# Patient Record
Sex: MALE | Race: WHITE | NOT HISPANIC OR LATINO | Employment: OTHER | URBAN - METROPOLITAN AREA
[De-identification: names, ages, dates, MRNs, and addresses within clinical notes are randomized per-mention and may not be internally consistent; named-entity substitution may affect disease eponyms.]

---

## 2018-09-22 ENCOUNTER — HOSPITAL ENCOUNTER (EMERGENCY)
Facility: HOSPITAL | Age: 83
Discharge: NURSING, REHAB OR LONG-TERM CARE | End: 2018-09-22
Attending: EMERGENCY MEDICINE | Admitting: EMERGENCY MEDICINE
Payer: MEDICARE

## 2018-09-22 VITALS
HEART RATE: 90 BPM | SYSTOLIC BLOOD PRESSURE: 112 MMHG | OXYGEN SATURATION: 95 % | RESPIRATION RATE: 18 BRPM | DIASTOLIC BLOOD PRESSURE: 62 MMHG | TEMPERATURE: 97.6 F

## 2018-09-22 DIAGNOSIS — R31.9 PAINLESS HEMATURIA: Primary | ICD-10-CM

## 2018-09-22 DIAGNOSIS — R31.0 GROSS HEMATURIA: ICD-10-CM

## 2018-09-22 DIAGNOSIS — Z97.8 CHRONIC INDWELLING FOLEY CATHETER: ICD-10-CM

## 2018-09-22 LAB
ALBUMIN SERPL BCP-MCNC: 2.8 G/DL (ref 3.5–5)
ALP SERPL-CCNC: 194 U/L (ref 46–116)
ALT SERPL W P-5'-P-CCNC: 16 U/L (ref 12–78)
ANION GAP SERPL CALCULATED.3IONS-SCNC: 7 MMOL/L (ref 4–13)
AST SERPL W P-5'-P-CCNC: 10 U/L (ref 5–45)
BASOPHILS # BLD AUTO: 0.04 THOUSANDS/ΜL (ref 0–0.1)
BASOPHILS NFR BLD AUTO: 0 % (ref 0–1)
BILIRUB SERPL-MCNC: 0.31 MG/DL (ref 0.2–1)
BUN SERPL-MCNC: 14 MG/DL (ref 5–25)
CALCIUM SERPL-MCNC: 8.7 MG/DL (ref 8.3–10.1)
CHLORIDE SERPL-SCNC: 100 MMOL/L (ref 100–108)
CO2 SERPL-SCNC: 26 MMOL/L (ref 21–32)
CREAT SERPL-MCNC: 1.01 MG/DL (ref 0.6–1.3)
EOSINOPHIL # BLD AUTO: 0.43 THOUSAND/ΜL (ref 0–0.61)
EOSINOPHIL NFR BLD AUTO: 4 % (ref 0–6)
ERYTHROCYTE [DISTWIDTH] IN BLOOD BY AUTOMATED COUNT: 13.2 % (ref 11.6–15.1)
GFR SERPL CREATININE-BSD FRML MDRD: 66 ML/MIN/1.73SQ M
GLUCOSE SERPL-MCNC: 156 MG/DL (ref 65–140)
HCT VFR BLD AUTO: 33.7 % (ref 36.5–49.3)
HGB BLD-MCNC: 11.1 G/DL (ref 12–17)
IMM GRANULOCYTES # BLD AUTO: 0.05 THOUSAND/UL (ref 0–0.2)
IMM GRANULOCYTES NFR BLD AUTO: 0 % (ref 0–2)
LYMPHOCYTES # BLD AUTO: 2.04 THOUSANDS/ΜL (ref 0.6–4.47)
LYMPHOCYTES NFR BLD AUTO: 17 % (ref 14–44)
MCH RBC QN AUTO: 29.8 PG (ref 26.8–34.3)
MCHC RBC AUTO-ENTMCNC: 32.9 G/DL (ref 31.4–37.4)
MCV RBC AUTO: 91 FL (ref 82–98)
MONOCYTES # BLD AUTO: 0.53 THOUSAND/ΜL (ref 0.17–1.22)
MONOCYTES NFR BLD AUTO: 4 % (ref 4–12)
NEUTROPHILS # BLD AUTO: 9.17 THOUSANDS/ΜL (ref 1.85–7.62)
NEUTS SEG NFR BLD AUTO: 75 % (ref 43–75)
NRBC BLD AUTO-RTO: 0 /100 WBCS
PLATELET # BLD AUTO: 322 THOUSANDS/UL (ref 149–390)
PMV BLD AUTO: 9.3 FL (ref 8.9–12.7)
POTASSIUM SERPL-SCNC: 3.6 MMOL/L (ref 3.5–5.3)
PROT SERPL-MCNC: 7.3 G/DL (ref 6.4–8.2)
RBC # BLD AUTO: 3.72 MILLION/UL (ref 3.88–5.62)
SODIUM SERPL-SCNC: 133 MMOL/L (ref 136–145)
WBC # BLD AUTO: 12.26 THOUSAND/UL (ref 4.31–10.16)

## 2018-09-22 PROCEDURE — 80053 COMPREHEN METABOLIC PANEL: CPT | Performed by: EMERGENCY MEDICINE

## 2018-09-22 PROCEDURE — 99284 EMERGENCY DEPT VISIT MOD MDM: CPT

## 2018-09-22 PROCEDURE — 36415 COLL VENOUS BLD VENIPUNCTURE: CPT | Performed by: EMERGENCY MEDICINE

## 2018-09-22 PROCEDURE — 85025 COMPLETE CBC W/AUTO DIFF WBC: CPT | Performed by: EMERGENCY MEDICINE

## 2018-09-22 NOTE — ED PROVIDER NOTES
History  Chief Complaint   Patient presents with    Blood in Urine     [facility] main catheter [facility]  Pt has no complaints  HPI    This is a 80-year-old male presenting emergency room for evaluation of hematuria  Patient has a history of a chronic and ongoing for the past 5 years  Patient has exchange performed every 1 month  His last exchange was performed 3 weeks ago  The caretaker noticed that he had some hematuria 5 days ago  This resolved  However, just a few hours prior to arrival in the ED, the caretaker noticed gross painless hematuria  The patient is asymptomatic at this time, he is denying any complaints, no dysuria, no fevers no chills, remainder ROS negative  None       Past Medical History:   Diagnosis Date    Anemia     Dementia     Diabetes mellitus ([facility] [address] )     Diastolic heart failure (HCC)     Hyperlipidemia     Hypertension     Osteoarthritis     PVD [facility]peripheral vascular disease) ([facility])        Past Surgical History:   Procedure Laterality Date    AMPUTATION Left     left bka       History reviewed  No pertinent family history  I have reviewed and agree with the history as documented  Social History   Substance Use Topics    Smoking status: Never Smoker    Smokeless tobacco: Never Used    Alcohol use No        Review of Systems   Constitutional: Negative for chills, fatigue and fever  HENT: Negative for nosebleeds and sore throat  Eyes: Negative for redness and visual disturbance  Respiratory: Negative for shortness of breath and wheezing  Cardiovascular: Negative for chest pain and palpitations  Gastrointestinal: Negative for abdominal pain and diarrhea  Endocrine: Negative for polyuria  Genitourinary: Positive for hematuria  Negative for difficulty urinating, discharge, flank pain, penile pain, penile swelling and testicular pain  Musculoskeletal: Negative for back pain and neck stiffness     Skin: Negative for rash and wound  Neurological: Negative for seizures, speech difficulty and headaches  Psychiatric/Behavioral: Negative for dysphoric mood and hallucinations  All other systems reviewed and are negative  Physical Exam  ED Triage Vitals [09/22/18 1839]   Temperature Pulse Respirations Blood Pressure SpO2   97 6 °F (36 4 °C) 94 18 136/58 99 %      Temp src Heart Rate Source Patient Position - Orthostatic VS BP Location FiO2 (%)   -- Monitor Lying Left arm --      Pain Score       No Pain           Orthostatic Vital Signs  Vitals:    09/22/18 1839 09/22/18 2057 09/22/18 2335   BP: 136/58 110/52 112/62   Pulse: 94 97 90   Patient Position - Orthostatic VS: Lying Lying        Physical Exam   Constitutional: He is oriented to person, place, and time  He appears well-developed and well-nourished  HENT:   Head: Normocephalic and atraumatic  Right Ear: External ear normal    Left Ear: External ear normal    Mouth/Throat: Oropharynx is clear and moist    Eyes: Conjunctivae and EOM are normal  Pupils are equal, round, and reactive to light  Neck: Normal range of motion  Cardiovascular: Normal rate, regular rhythm, normal heart sounds and intact distal pulses  Pulmonary/Chest: Effort normal and breath sounds normal  He has no wheezes  He exhibits no tenderness  Abdominal: Soft  Bowel sounds are normal  There is no tenderness  There is no guarding  no suprapubic tenderness  Musculoskeletal: Normal range of motion  No CVA tenderness   Neurological: He is alert and oriented to person, place, and time  No cranial nerve deficit or sensory deficit  He exhibits normal muscle tone  Coordination normal    Skin: Skin is warm and dry  No rash noted  On examination of the patient's genital area, patient has indwelling Hays, mild erythema around the meatus, no discharge or purulence noted  Indwelling Hays catheter bag has gross hematuria  Psychiatric: He has a normal mood and affect     Nursing note and vitals reviewed  ED Medications  Medications - No data to display    Diagnostic Studies  Results Reviewed     Procedure Component Value Units Date/Time    Comprehensive metabolic panel [[id number]]  (Abnormal) Collected:  09/22/18 2052    Lab Status:  Final result Specimen:  Blood from Hand, Left Updated:  09/22/18 2124     Sodium 133 (L) mmol/L      Potassium 3 6 mmol/L      Chloride 100 mmol/L      CO2 26 mmol/L      ANION GAP 7 mmol/L      BUN 14 mg/dL      Creatinine 1 01 mg/dL      Glucose 156 (H) mg/dL      Calcium 8 7 mg/dL      AST 10 U/L      ALT 16 U/L      Alkaline Phosphatase 194 (H) U/L      Total Protein 7 3 g/dL      Albumin 2 8 (L) g/dL      Total Bilirubin 0 31 mg/dL      eGFR 66 ml/min/1 73sq m     Narrative:         National Kidney Disease Education Program recommendations are as follows:  GFR calculation is accurate only with a steady state creatinine  Chronic Kidney disease less than 60 ml/min/1 73 sq  meters  Kidney failure less than 15 ml/min/1 73 sq  meters      CBC and differential [[id number]]  (Abnormal) Collected:  09/22/18 2052    Lab Status:  Final result Specimen:  Blood from Hand, Left Updated:  09/22/18 2105     WBC 12 26 (H) Thousand/uL      RBC 3 72 (L) Million/uL      Hemoglobin 11 1 (L) g/dL      Hematocrit 33 7 (L) %      MCV 91 fL      MCH 29 8 pg      MCHC 32 9 g/dL      RDW 13 2 %      MPV 9 3 fL      Platelets 284 Thousands/uL      nRBC 0 /100 WBCs      Neutrophils Relative 75 %      Immat GRANS % 0 %      Lymphocytes Relative 17 %      Monocytes Relative 4 %      Eosinophils Relative 4 %      Basophils Relative 0 %      Neutrophils Absolute 9 17 (H) Thousands/µL      Immature Grans Absolute 0 05 Thousand/uL      Lymphocytes Absolute 2 04 Thousands/µL      Monocytes Absolute 0 53 Thousand/µL      Eosinophils Absolute 0 43 Thousand/µL      Basophils Absolute 0 04 Thousands/µL                  No orders to display         Procedures  Bladder Cath  Date/Time: 9/22/2018 9:01 PM  Performed by: Delma Broderick by: Barbi Hartman     Patient location:  ED  Consent:     Consent obtained:  Verbal    Consent given by:  Patient    Procedural risks discussed: yes  Alternatives discussed: yes  Universal protocol:     Procedure explained and questions answered to patient or proxy's satisfaction: yes      Relevant documents present and verified: yes      Patient identity confirmed:  Verbally with patient  Pre-procedure details:     Procedure purpose:  Therapeutic  Anesthesia (see MAR for exact dosages): Anesthesia method:  None  Procedure details:     Catheter insertion:  Indwelling    Approach: natural orifice      Catheter type:  Latex and Hays    Catheter size:  18 Fr    Number of attempts:  1    Successful placement: yes      Urine characteristics:  Bloody and clear (Initially output was bloody, which became clear )    Provider performed due to:  Complicated insertion  Post-procedure details:     Patient tolerance of procedure: Tolerated well, no immediate complications          Phone Consults  ED Phone Contact    ED Course  ED Course as of Sep 23 1543   Sat Sep 22, 2018   2115 Hemoglobin: (!) 11 1   2115 Platelets: 402           Identification of Seniors at Risk      Most Recent Value   (ISAR) Identification of Seniors at Risk   Before the illness or injury that brought you to the Emergency, did you need someone to help you on a regular basis? 1 Filed at: 09/22/2018 1840   In the last 24 hours, have you needed more help than usual?  0 Filed at: 09/22/2018 1840   Have you been hospitalized for one or more nights during the past 6 months? 0 Filed at: 09/22/2018 1840   In general, do you see well?  0 Filed at: 09/22/2018 1840   In general, do you have serious problems with your memory? 0 Filed at: 09/22/2018 1840   Do you take more than three different medications every day?   1 Filed at: 09/22/2018 1840   ISAR Score  2 Filed at: 09/22/2018 4801 [facility] Time    This is a 79-year-old male presenting to the emergency department for evaluation of painless gross hematuria  Will check a CBC, CMP looking for a creatinine elevation or decrease in hemoglobin or thrombocytopenia  Patient otherwise asymptomatic, no concern for UTI  We performed a Hays exchange, given that the Hays appear to be blocked, from a clot  See procedure note above  Labs are unremarkable, patient will be discharged home and follow up with Urology  Return precautions given, including of decreased drainage, increasing pain  The patient was instructed to follow up as documented  Strict return precautions were discussed with the patient and the patient was instructed to return to the emergency department immediately if symptoms worsen  The patient/patient family member acknowledged and were in agreement with plan  Disposition  Final diagnoses:   Gross hematuria   Painless hematuria   Chronic indwelling Hays catheter     Time reflects when diagnosis was documented in both MDM as applicable and the Disposition within this note     Time User Action Codes Description Comment    9/22/2018  9:06 PM Torsten Cheese Add [R31 0] Gross hematuria     9/22/2018  9:06 PM Torsten Cheese Add [R31 9] Painless hematuria     9/22/2018  9:06 PM Torsten Cheese Modify [R31 0] Gross hematuria     9/22/2018  9:06 PM Torsten Cheese Modify [R31 9] Painless hematuria     9/22/2018  9:06 PM Torsten Cheese Add [Z92 89] Chronic indwelling Hays catheter       ED Disposition     ED Disposition Condition Comment    Discharge  [address] discharge to home/self care      Condition at discharge: Good        Follow-up Information     Follow up With Specialties Details Why Contact Info Additional Information    Renée Cam MD Urology Schedule an appointment as soon as possible for a visit today For follow up regarding your symptoms [address] [place] [address] [address] Emergency Department Emergency Medicine Go to If symptoms worsen [address]  [phone]  ED, [address] [place] [address], [facility]          There are no discharge medications for this patient  No discharge procedures on file  ED Provider  Attending physically available and evaluated Meño Giraldo I managed the patient along with the ED Attending      Electronically Signed by         La Carlos MD  09/23/18 4401

## 2018-09-23 NOTE — ED ATTENDING ATTESTATION
Monique Goldberg MD, saw and evaluated the patient  I have discussed the patient with the resident/non-physician practitioner and agree with the resident's/non-physician practitioner's findings, Plan of Care, and MDM as documented in the resident's/non-physician practitioner's note, except where noted  All available labs and Radiology studies were reviewed  At this point I agree with the current assessment done in the Emergency Department  I have conducted an independent evaluation of this patient a history and physical is as follows: This patient is an 49-year-old man with chronic indwelling Hays who presents with hematuria  This morning the patient noticed dark blood in his catheter bag, denies any associated pain including abdominal pain, flank pain, penile pain, dysuria  No fever or chills  No nausea or vomiting  No chest pain, shortness of breath, dizziness, lightheadedness  The patient is on aspirin and Plavix, is not on anticoagulation  Vital signs reviewed  On examination the patient is awake and alert, in no acute distress  Head is atraumatic and normocephalic  Pupils equal and reactive bilaterally, normal conjunctiva  Oropharynx clear  Moist mucous membranes  Neck is supple  Heart regular rate and rhythm, no murmurs, rubs or gallops  Lungs clear to auscultation bilaterally with no respiratory distress  Abdomen soft, nontender, nondistended  Hays catheter in place, does not appear to be draining  Extremities with no edema and normal range of motion  Skin warm, dry, intact  Assessment and plan:  49-year-old man with hematuria  Attempted irrigation of the patient's indwelling Hays and unable to effectively irrigate, bedside ultrasound showed full bladder  Replaced Hays catheter with 25 Turkmen coude and irrigated using piston  and sterile saline  Moderate amount of clot removed and ultimately able to decompress the bladder, confirmed by ultrasound    Checking CBC to assess for anemia or thrombocytopenia  As long as CBC is reassuring will plan discharge with urology follow-up  I discussed return precautions with the patient and his caregiver        Critical Care Time  CritCare Time    Procedures

## 2018-09-23 NOTE — DISCHARGE INSTRUCTIONS
Hematuria   AMBULATORY CARE:   Hematuria  is blood in your urine  Your urine may be bright red to dark brown  Common symptoms you might have with hematuria include the following:   · Fever     · Nausea and vomiting     · Pain or bruising on your lower back or sides     · Pain when you urinate     · More urination than usual, or the need to urinate right away  Seek care immediately if:   · You have blood in your urine after a new injury, such as a fall  · You are urinating very small amounts or not at all  · You feel like you cannot empty your bladder  · You have severe back or side pain that does not go away with treatment  Contact your healthcare provider if:   · You have a fever that gets worse or does not go away with treatment  · You cannot keep liquids or medicines down  · Your urine gets darker, even after you drink extra liquids  · You have questions or concerns about your condition, treatment, or care  Treatment for hematuria  depends on the cause of your hematuria  Treatment may not be needed  You may need medicines to treat an infection  Ask your healthcare provider for more information about the treatment you may need  Drink liquids as directed: You may need to drink extra liquids to help flush the blood from your body through your urine  Water is the best liquid to drink  Ask how much liquid to drink each day and which liquids are best for you  Follow up with your healthcare provider as directed:  Write down your questions so you remember to ask them during your visits  © 2017 [address] Information is for End User's use only and may not be sold, redistributed or otherwise used for commercial purposes  All illustrations and images included in CareNotes® are the copyrighted property of A D A M , Inc  or Shamar Tanner  The above information is an  only  It is not intended as medical advice for individual conditions or treatments   Talk to your doctor, nurse or pharmacist before following any medical regimen to see if it is safe and effective for you  Urinary Leg Bag   WHAT YOU NEED TO KNOW:   What is a urinary leg bag? A urinary leg bag holds urine that drains from your catheter  It fits under your clothes and allows you to do your normal daily activities  How do I use a urinary leg bag? · Wash your hands  before and after you touch your catheter, tubing, or drainage bag  Use soap and water  This reduces the risk of infection  · Strap your leg bag to your thigh or calf  Make sure the straps are comfortable  The straps can cause problems with blood flow in your leg if they are too tight  · Clean the tip of the drainage tube with alcohol  before attaching it to your catheter  This helps prevent bacteria from getting into your catheter  · The connecting tube should not pull on your catheter  Skin breakdown can occur if there is constant pulling on the catheter  · Check the tube often to make sure it is not kinked or twisted  Blockage in the tube can cause urine to back up into your bladder  Your urine must flow straight through the tube into your leg bag  · Always keep the leg bag below your bladder  This prevents urine from the bag going back into your bladder, which may cause an infection  · Empty your leg bag when it is ½ full, or every 3 hours  A full bag may break or disconnect from the catheter  · Change to your bedside bag before you go to bed  Your bedside bag can hold more urine  Do not use your leg bag at night because it could become too full or break  · Clean your leg bag after every use  Fill the bag with 2 parts vinegar and 3 parts water  Let it soak for 20 minutes, then rinse and let dry  Follow your healthcare provider's instruction on replacing your leg bag with a new one  CARE AGREEMENT:   You have the right to help plan your care   Learn about your health condition and how it may be treated  Discuss treatment options with your caregivers to decide what care you want to receive  You always have the right to refuse treatment  The above information is an  only  It is not intended as medical advice for individual conditions or treatments  Talk to your doctor, nurse or pharmacist before following any medical regimen to see if it is safe and effective for you  © 2017 [address] Information is for End User's use only and may not be sold, redistributed or otherwise used for commercial purposes  All illustrations and images included in CareNotes® are the copyrighted property of A D A M [employer] Inc  or [employer]  Hays Catheter Placement and Care   WHAT YOU NEED TO KNOW:   What is a Hays catheter? A Hays catheter is a sterile tube that is inserted into your bladder to drain urine  It is also called an indwelling urinary catheter  The tip of the catheter has a small balloon filled with solution that holds the catheter in your bladder  How is a Hays catheter placed? · Your healthcare provider will clean your genital area with a sterile solution  He or she will put lubricant jelly on the catheter to help it go in smoothly  The end of the catheter with the deflated balloon will be inserted into your urethra  The catheter will be moved slowly and gently into your bladder  You may be asked to take slow, deep breaths or to push as if you were trying to urinate as the catheter is inserted  · When your healthcare provider sees urine flowing from the catheter, he or she will fill the balloon at the end of the catheter  The balloon holds the catheter in place so it does not come out  Your healthcare provider will attach the open end of the catheter to a sterile drainage bag  How do I care for my Hays catheter? · Clean your genital area 2 times every day  Clean your catheter and the area around where it was inserted  Use soap and water   Clean your anal opening and catheter area after every bowel movement  · Secure the catheter tube  so you do not pull or move the catheter  This helps prevent pain and bladder spasms  Healthcare providers will show you how to use medical tape or a strap to secure the catheter tube to your body  · Keep a closed drainage system  Your Hays catheter should always be attached to the drainage bag to form a closed system  Do not disconnect any part of the closed system unless you need to change the bag  How do I care for my drainage bag? · Ask if a leg bag is right for you  A leg bag can be worn under your clothes  Ask your healthcare provider for more information about a leg bag  · Keep the drainage bag below the level of your waist   This helps stop urine from moving back up the tubing and into your bladder  Do not loop or kink the tubing  This can cause urine to back up and collect in your bladder  Do not let the drainage bag touch or lie on the floor  · Empty the drainage bag when needed  The weight of a full drainage bag can be painful  Empty the drainage bag every 3 to 6 hours or when it is ? full  · Clean and change the drainage bag as directed  Ask your healthcare provider how often you should change the drainage bag and what cleaning solution to use  Wear disposable gloves when you change the bag  Do not allow the end of the catheter or tubing to touch anything  Clean the ends with an alcohol pad before you reconnect them  What can I do if problems develop? · No urine is draining into the bag:      ¨ Check for kinks in the tubing and straighten them out  ¨ Check the tape or strap used to secure the catheter tube to your skin  Make sure it is not blocking the tube  ¨ Make sure you are not sitting or lying on the tubing      ¨ Make sure the urine bag is hanging below the level of your waist     · Urine leaks from or around the catheter, tubing, or drainage bag:  Check if the closed drainage system has accidently come open or apart  Clean the catheter and tubing ends with a new alcohol pad and reconnect them  What are the risks of a Hays catheter? You may develop an infection caused by bacteria  This can happen when the drainage system is opened and bacteria get inside the tubing  You can also get an infection if the catheter equipment is not cleaned well or you do not wash your hands  The infection can spread to your bladder or other organs, and may become severe  How can I help prevent an infection? · Wash your hands often  Wash before and after you touch your catheter, tubing, or drainage bag  Use soap and water  Wear clean disposable gloves when you care for your catheter or disconnect the drainage bag  Wash your hands before you prepare or eat food  · Drink liquids as directed  Ask your healthcare provider how much liquid to drink each day and which liquids are best for you  Liquids will help flush your kidneys and bladder to help prevent infection  When should I seek immediate care? · Your catheter comes out  · You suddenly have material that looks like sand in the tubing or drainage bag  · No urine is draining into the bag and you have checked the system  · You have pain in your hip, back, pelvis, or lower abdomen  · You are confused or cannot think clearly  When should I contact my healthcare provider? · You have a fever  · You have bladder spasms for more than 1 day after the catheter is placed  · You see blood in the tubing or drainage bag  · You have a rash or itching where the catheter tube is secured to your skin  · Urine leaks from or around the catheter, tubing, or drainage bag  · The closed drainage system has accidently come open or apart  · You see a layer of crystals inside the tubing  · You have questions or concerns about your condition or care  CARE AGREEMENT:   You have the right to help plan your care   Learn about your health condition and how it may be treated  Discuss treatment options with your caregivers to decide what care you want to receive  You always have the right to refuse treatment  The above information is an  only  It is not intended as medical advice for individual conditions or treatments  Talk to your doctor, nurse or pharmacist before following any medical regimen to see if it is safe and effective for you  © 2017 [address] Information is for End User's use only and may not be sold, redistributed or otherwise used for commercial purposes  All illustrations and images included in CareNotes® are the copyrighted property of A [employer] M [employer] Inc  or Shamar Tanner

## 2018-09-23 NOTE — ED NOTES
Transport with [facility] arranged for [phone]         Zee Grayson, ANTWON  09/22/18 4251

## 2018-09-23 NOTE — ED NOTES
[facility] can transport at [address] [place] [address], [address] [place] [address]  09/22/18 7213

## 2018-09-23 NOTE — ED NOTES
Dr yusuf and dr Carlitos Senior placed 16fr coude cath after vigorous flushing  New catheter attached to leg bag, appears to drain well         Leslie Patel RN  09/22/18 2035

## 2019-03-04 ENCOUNTER — APPOINTMENT (EMERGENCY)
Dept: RADIOLOGY | Facility: HOSPITAL | Age: 84
End: 2019-03-04
Payer: MEDICARE

## 2019-03-04 ENCOUNTER — HOSPITAL ENCOUNTER (EMERGENCY)
Facility: HOSPITAL | Age: 84
Discharge: HOME | End: 2019-03-04
Attending: EMERGENCY MEDICINE | Admitting: EMERGENCY MEDICINE
Payer: MEDICARE

## 2019-03-04 VITALS
DIASTOLIC BLOOD PRESSURE: 60 MMHG | HEART RATE: 92 BPM | TEMPERATURE: 98.3 F | RESPIRATION RATE: 20 BRPM | SYSTOLIC BLOOD PRESSURE: 135 MMHG | OXYGEN SATURATION: 97 %

## 2019-03-04 DIAGNOSIS — N39.0 UTI (URINARY TRACT INFECTION): Primary | ICD-10-CM

## 2019-03-04 DIAGNOSIS — K59.00 CONSTIPATION: ICD-10-CM

## 2019-03-04 LAB
ALBUMIN SERPL BCP-MCNC: 3.2 G/DL (ref 3.5–5)
ALP SERPL-CCNC: 198 U/L (ref 46–116)
ALT SERPL W P-5'-P-CCNC: 16 U/L (ref 12–78)
ANION GAP SERPL CALCULATED.3IONS-SCNC: 7 MMOL/L (ref 4–13)
AST SERPL W P-5'-P-CCNC: 11 U/L (ref 5–45)
ATRIAL RATE: 80 BPM
BACTERIA UR QL AUTO: ABNORMAL /HPF
BASOPHILS # BLD AUTO: 0.02 THOUSANDS/ΜL (ref 0–0.1)
BASOPHILS NFR BLD AUTO: 0 % (ref 0–1)
BILIRUB SERPL-MCNC: 0.51 MG/DL (ref 0.2–1)
BILIRUB UR QL STRIP: NEGATIVE
BUN SERPL-MCNC: 21 MG/DL (ref 5–25)
CALCIUM SERPL-MCNC: 9 MG/DL (ref 8.3–10.1)
CHLORIDE SERPL-SCNC: 104 MMOL/L (ref 100–108)
CLARITY UR: ABNORMAL
CLARITY, POC: NORMAL
CO2 SERPL-SCNC: 26 MMOL/L (ref 21–32)
COLOR UR: YELLOW
COLOR, POC: YELLOW
CREAT SERPL-MCNC: 1.06 MG/DL (ref 0.6–1.3)
EOSINOPHIL # BLD AUTO: 0.16 THOUSAND/ΜL (ref 0–0.61)
EOSINOPHIL NFR BLD AUTO: 1 % (ref 0–6)
ERYTHROCYTE [DISTWIDTH] IN BLOOD BY AUTOMATED COUNT: 12.9 % (ref 11.6–15.1)
GFR SERPL CREATININE-BSD FRML MDRD: 62 ML/MIN/1.73SQ M
GLUCOSE SERPL-MCNC: 126 MG/DL (ref 65–140)
GLUCOSE UR STRIP-MCNC: NEGATIVE MG/DL
HCT VFR BLD AUTO: 39.1 % (ref 36.5–49.3)
HGB BLD-MCNC: 13 G/DL (ref 12–17)
HGB UR QL STRIP.AUTO: ABNORMAL
IMM GRANULOCYTES # BLD AUTO: 0.05 THOUSAND/UL (ref 0–0.2)
IMM GRANULOCYTES NFR BLD AUTO: 0 % (ref 0–2)
KETONES UR STRIP-MCNC: NEGATIVE MG/DL
LACTATE SERPL-SCNC: 1.9 MMOL/L (ref 0.5–2)
LEUKOCYTE ESTERASE UR QL STRIP: ABNORMAL
LIPASE SERPL-CCNC: 68 U/L (ref 73–393)
LYMPHOCYTES # BLD AUTO: 1.69 THOUSANDS/ΜL (ref 0.6–4.47)
LYMPHOCYTES NFR BLD AUTO: 13 % (ref 14–44)
MCH RBC QN AUTO: 30.7 PG (ref 26.8–34.3)
MCHC RBC AUTO-ENTMCNC: 33.2 G/DL (ref 31.4–37.4)
MCV RBC AUTO: 92 FL (ref 82–98)
MONOCYTES # BLD AUTO: 0.49 THOUSAND/ΜL (ref 0.17–1.22)
MONOCYTES NFR BLD AUTO: 4 % (ref 4–12)
NEUTROPHILS # BLD AUTO: 10.81 THOUSANDS/ΜL (ref 1.85–7.62)
NEUTS SEG NFR BLD AUTO: 82 % (ref 43–75)
NITRITE UR QL STRIP: NEGATIVE
NON-SQ EPI CELLS URNS QL MICRO: ABNORMAL /HPF
NRBC BLD AUTO-RTO: 0 /100 WBCS
OTHER STN SPEC: ABNORMAL
P AXIS: 76 DEGREES
PH UR STRIP.AUTO: 5.5 [PH] (ref 4.5–8)
PLATELET # BLD AUTO: 313 THOUSANDS/UL (ref 149–390)
PMV BLD AUTO: 9.7 FL (ref 8.9–12.7)
POTASSIUM SERPL-SCNC: 4.1 MMOL/L (ref 3.5–5.3)
PR INTERVAL: 280 MS
PROT SERPL-MCNC: 7.4 G/DL (ref 6.4–8.2)
PROT UR STRIP-MCNC: ABNORMAL MG/DL
QRS AXIS: -60 DEGREES
QRSD INTERVAL: 126 MS
QT INTERVAL: 406 MS
QTC INTERVAL: 468 MS
RBC # BLD AUTO: 4.23 MILLION/UL (ref 3.88–5.62)
RBC #/AREA URNS AUTO: ABNORMAL /HPF
SODIUM SERPL-SCNC: 137 MMOL/L (ref 136–145)
SP GR UR STRIP.AUTO: 1.01 (ref 1–1.03)
T WAVE AXIS: 43 DEGREES
TROPONIN I SERPL-MCNC: <0.02 NG/ML
UROBILINOGEN UR QL STRIP.AUTO: 1 E.U./DL
VENTRICULAR RATE: 80 BPM
WBC # BLD AUTO: 13.22 THOUSAND/UL (ref 4.31–10.16)
WBC #/AREA URNS AUTO: ABNORMAL /HPF

## 2019-03-04 PROCEDURE — 81003 URINALYSIS AUTO W/O SCOPE: CPT

## 2019-03-04 PROCEDURE — 83690 ASSAY OF LIPASE: CPT | Performed by: EMERGENCY MEDICINE

## 2019-03-04 PROCEDURE — 87147 CULTURE TYPE IMMUNOLOGIC: CPT

## 2019-03-04 PROCEDURE — 36415 COLL VENOUS BLD VENIPUNCTURE: CPT | Performed by: EMERGENCY MEDICINE

## 2019-03-04 PROCEDURE — 87077 CULTURE AEROBIC IDENTIFY: CPT

## 2019-03-04 PROCEDURE — 74177 CT ABD & PELVIS W/CONTRAST: CPT

## 2019-03-04 PROCEDURE — 99285 EMERGENCY DEPT VISIT HI MDM: CPT

## 2019-03-04 PROCEDURE — 83605 ASSAY OF LACTIC ACID: CPT | Performed by: EMERGENCY MEDICINE

## 2019-03-04 PROCEDURE — 80053 COMPREHEN METABOLIC PANEL: CPT | Performed by: EMERGENCY MEDICINE

## 2019-03-04 PROCEDURE — 81002 URINALYSIS NONAUTO W/O SCOPE: CPT | Performed by: EMERGENCY MEDICINE

## 2019-03-04 PROCEDURE — 71046 X-RAY EXAM CHEST 2 VIEWS: CPT

## 2019-03-04 PROCEDURE — 84484 ASSAY OF TROPONIN QUANT: CPT | Performed by: EMERGENCY MEDICINE

## 2019-03-04 PROCEDURE — 93005 ELECTROCARDIOGRAM TRACING: CPT

## 2019-03-04 PROCEDURE — 87186 SC STD MICRODIL/AGAR DIL: CPT

## 2019-03-04 PROCEDURE — 96365 THER/PROPH/DIAG IV INF INIT: CPT

## 2019-03-04 PROCEDURE — 85025 COMPLETE CBC W/AUTO DIFF WBC: CPT | Performed by: EMERGENCY MEDICINE

## 2019-03-04 PROCEDURE — 87086 URINE CULTURE/COLONY COUNT: CPT

## 2019-03-04 PROCEDURE — 93010 ELECTROCARDIOGRAM REPORT: CPT | Performed by: INTERNAL MEDICINE

## 2019-03-04 PROCEDURE — 81001 URINALYSIS AUTO W/SCOPE: CPT

## 2019-03-04 RX ORDER — KETOCONAZOLE 20 MG/G
CREAM TOPICAL DAILY
COMMUNITY

## 2019-03-04 RX ORDER — POLYETHYLENE GLYCOL 3350 17 G/17G
17 POWDER, FOR SOLUTION ORAL DAILY
Qty: 255 G | Refills: 0 | Status: SHIPPED | OUTPATIENT
Start: 2019-03-04

## 2019-03-04 RX ORDER — CEPHALEXIN 250 MG/1
500 CAPSULE ORAL 4 TIMES DAILY
Qty: 56 CAPSULE | Refills: 0 | Status: SHIPPED | OUTPATIENT
Start: 2019-03-04 | End: 2019-03-11

## 2019-03-04 RX ORDER — NYSTATIN AND TRIAMCINOLONE ACETONIDE 100000; 1 [USP'U]/G; MG/G
1 OINTMENT TOPICAL 2 TIMES DAILY
COMMUNITY

## 2019-03-04 RX ORDER — SYRINGE-NEEDLE,INSULIN,0.5 ML 27GX1/2"
SYRINGE, EMPTY DISPOSABLE MISCELLANEOUS
COMMUNITY
End: 2021-07-16 | Stop reason: ALTCHOICE

## 2019-03-04 RX ORDER — PANTOPRAZOLE SODIUM 40 MG/1
40 TABLET, DELAYED RELEASE ORAL 2 TIMES DAILY
COMMUNITY

## 2019-03-04 RX ORDER — ACETAMINOPHEN 500 MG
500 TABLET ORAL EVERY 6 HOURS PRN
COMMUNITY

## 2019-03-04 RX ORDER — TAMSULOSIN HYDROCHLORIDE 0.4 MG/1
0.4 CAPSULE ORAL
COMMUNITY
Start: 2011-02-13

## 2019-03-04 RX ORDER — FLUTICASONE PROPIONATE 50 MCG
SPRAY, SUSPENSION (ML) NASAL
COMMUNITY

## 2019-03-04 RX ORDER — ASPIRIN 81 MG/1
81 TABLET ORAL DAILY
COMMUNITY

## 2019-03-04 RX ORDER — MELATONIN
1000 DAILY
COMMUNITY

## 2019-03-04 RX ADMIN — SODIUM CHLORIDE 500 ML: 0.9 INJECTION, SOLUTION INTRAVENOUS at 05:23

## 2019-03-04 RX ADMIN — IOHEXOL 100 ML: 350 INJECTION, SOLUTION INTRAVENOUS at 06:53

## 2019-03-04 RX ADMIN — CEFTRIAXONE SODIUM 1000 MG: 10 INJECTION, POWDER, FOR SOLUTION INTRAVENOUS at 07:07

## 2019-03-04 NOTE — ED NOTES
Upon inserting catheter, no urine noted in main tubing  Did not meet any resistance during insertion  Catheter assessed for coiling  Pt denied any pain on insertion  Bladder scanner utilized, read urine 0 mL  Per Dr Cornel Hernandez balloon to be inflated despite no urine return  Balloon inflated with 10ml  After inflation of balloon, urine noted in catheter tubing  Pt denies discomfort at this time  Urine appears to be free flowing in catheter tubing at this time        Jodi Braswell, ANTWON  03/04/19 9705

## 2019-03-04 NOTE — ED NOTES
Spoke to patient's niece who is requesting for patient to be picked up from facility van at discharge   [place] (niece) aware that     Debora Company, RN  03/04/19 1125

## 2019-03-04 NOTE — ED NOTES
Spoke with [facility] about pt's discharge, and they stated they will send a [employer] to pick him up  Pt made aware        Caren Mayorga  03/04/19 0274

## 2019-03-04 NOTE — ED NOTES
Spoke to Jeremy Castle in the reading room who states that they are working on final results of CT  Spoke to facility that patient resides and they did states that they can pick him up by a certain time  Bliss can be reached at [phone] once results are in        Barb James RN  03/04/19 0952

## 2019-03-04 NOTE — ED NOTES
Patient having excessive amounts of dark green thick diarrhea  PT cleaned and barrier cream applied to bottom at this time   Pt has red/blanchable area to sacrum and left posterior thigh       Maryjo Trejo RN  03/04/19 0692

## 2019-03-04 NOTE — ED PROVIDER NOTES
History  Chief Complaint   Patient presents with    Medical Problem     nursing home reports patient was constipated, having belly pain, and reports burning with urination  80-year-old male with history of hypertension, diabetes, below the knee amputation, urinary retention with indwelling Hays brought from nursing facility to emergency department for abdominal pain  Per medics report patient had been intermittently complaining of abdominal pain and constipation today and had a fever at facility that was treated with Tylenol prior to arrival   On ED encounter patient denies ever having abdominal pain  He does complain of dysuria  He had an episode of loose stools here  Patient denies chest pain, shortness of breath, nausea, vomiting, lightheadedness, focal neurological symptoms, rashes, skin lesions, any additional complaints  Prior to Admission Medications   Prescriptions Last Dose Informant Patient Reported? Taking?    INSULIN SYRINGE  5CC/28G 28G X 1/2" 0 5 ML MISC   Yes No   Sig: Monoject Insulin Safety Syringe 0 5 mL 29 gauge x 1/2"   Menthol-Zinc Oxide (CALMOSEPTINE EX)   Yes Yes   Sig: Apply 1 application topically   Menthol-Zinc Oxide (RISAMINE EX)   Yes Yes   Sig: Apply topically   NON FORMULARY   Yes Yes   Si application as needed   acetaminophen (TYLENOL) 500 mg tablet   Yes Yes   Sig: Take 500 mg by mouth every 6 (six) hours as needed for mild pain   aspirin (ECOTRIN LOW STRENGTH) 81 mg EC tablet   Yes Yes   Sig: Take 81 mg by mouth daily   cholecalciferol (VITAMIN D3) 1,000 units tablet   Yes Yes   Sig: Take 1,000 Units by mouth daily   fluticasone (FLONASE) 50 mcg/act nasal spray   Yes No   Sig: fluticasone propionate 50 mcg/actuation nasal spray,suspension   glucose blood test strip   Yes No   Sig: test blood sugar & accucheck fasting at 7am and 2 hours after   ketoconazole (NIZORAL) 2 % cream   Yes Yes   Sig: Apply topically daily   metFORMIN (GLUCOPHAGE) 500 mg tablet   Yes Yes Sig: Take 500 mg by mouth daily with breakfast   nystatin-triamcinolone (MYCOLOG-II) ointment   Yes Yes   Sig: Apply 1 application topically 2 (two) times a day   pantoprazole (PROTONIX) 40 mg tablet   Yes Yes   Sig: Take 40 mg by mouth 2 (two) times a day   tamsulosin (FLOMAX) 0 4 mg   Yes Yes   Si 4 mg      Facility-Administered Medications: None       Past Medical History:   Diagnosis Date    Anemia     Dementia     Diabetes mellitus (HCC)     Diastolic heart failure (HCC)     Hyperlipidemia     Hypertension     Osteoarthritis     PVD (peripheral vascular disease) ([facility] [address] )        Past Surgical History:   Procedure Laterality Date    AMPUTATION Left     left bka       History reviewed  No pertinent family history  I have reviewed and agree with the history as documented  Social History     Tobacco Use    Smoking status: Never Smoker    Smokeless tobacco: Never Used   Substance Use Topics    Alcohol use: No    Drug use: Not on file        Review of Systems   Constitutional: Positive for fever  Negative for chills  HENT: Negative  Negative for congestion and rhinorrhea  Eyes: Negative  Respiratory: Negative  Negative for cough and shortness of breath  Cardiovascular: Negative  Negative for chest pain and leg swelling  Gastrointestinal: Positive for abdominal pain  Negative for abdominal distention, diarrhea, nausea and vomiting  Genitourinary: Positive for dysuria  Musculoskeletal: Negative  Negative for back pain and neck pain  Skin: Negative  Negative for rash  Neurological: Negative  Negative for light-headedness and headaches  Hematological: Negative  All other systems reviewed and are negative        Physical Exam  ED Triage Vitals [19 0449]   Temperature Pulse Respirations Blood Pressure SpO2   98 3 °F (36 8 °C) 86 18 137/63 95 %      Temp Source Heart Rate Source Patient Position - Orthostatic VS BP Location FiO2 (%)   Rectal Monitor Sitting Right arm --      Pain Score       No Pain           Orthostatic Vital Signs  Vitals:    03/04/19 1000 03/04/19 1030 03/04/19 1204 03/04/19 1300   BP: 113/52 120/57 120/59 135/60   Pulse: 86 88 99 92   Patient Position - Orthostatic VS: Lying Lying Lying        Physical Exam   Constitutional: He is oriented to person, place, and time  He appears well-developed and well-nourished  No distress  HENT:   Head: Normocephalic and atraumatic  Mouth/Throat: Oropharynx is clear and moist    Eyes: EOM are normal    Neck: Normal range of motion  Neck supple  Cardiovascular: Normal rate, regular rhythm, normal heart sounds and intact distal pulses  Exam reveals no gallop and no friction rub  No murmur heard  Pulmonary/Chest: Effort normal and breath sounds normal  No respiratory distress  He has no wheezes  He has no rales  Abdominal: Soft  Bowel sounds are normal  He exhibits no distension and no mass  There is no tenderness  There is no rebound and no guarding  Genitourinary:   Genitourinary Comments: Indwelling Hays draining yellow cloudy urine   Musculoskeletal: He exhibits no edema or tenderness  Left BKA   Neurological: He is alert and oriented to person, place, and time  No cranial nerve deficit or sensory deficit  He exhibits normal muscle tone  Coordination normal    Clear fluent speech   Skin: Skin is warm and dry  Capillary refill takes less than 2 seconds  Psychiatric: He has a normal mood and affect  Nursing note and vitals reviewed        ED Medications  Medications   sodium chloride 0 9 % bolus 500 mL (0 mL Intravenous Stopped 3/4/19 0552)   ceftriaxone (ROCEPHIN) 1 g/50 mL in dextrose IVPB (0 mg Intravenous Stopped 3/4/19 0809)   iohexol (OMNIPAQUE) 350 MG/ML injection (MULTI-DOSE) 100 mL (100 mL Intravenous Given 3/4/19 0653)       Diagnostic Studies  Results Reviewed     Procedure Component Value Units Date/Time    Urine culture [[id number]]  (Abnormal)  (Susceptibility) Collected:  03/04/19 0524    Lab Status:  Preliminary result Specimen:  Urine, Indwelling Hays Catheter Updated:  03/06/19 0841     Urine Culture >100,000 cfu/ml Pseudomonas aeruginosa    Susceptibility     Pseudomonas aeruginosa (1)     Antibiotic Interpretation Microscan Method Status    Aztreonam ($$$)  Susceptible <4 ug/ml WALT Preliminary    Cefepime ($) Susceptible <2 00 ug/ml WALT Preliminary    Ceftazidime ($$) Susceptible <1 ug/ml WALT Preliminary    Ciprofloxacin ($)  Resistant >2 00 ug/ml WALT Preliminary    Gentamicin ($$) Susceptible <1 ug/ml WALT Preliminary    Imipenem Susceptible 2 ug/ml WALT Preliminary    Levofloxacin ($) Susceptible 1 00 ug/ml WALT Preliminary    Meropenem ($$) Susceptible <1 00 ug/ml WALT Preliminary    Piperacillin + Tazobactam ($$$) Susceptible <4 ug/ml WALT Preliminary    Tobramycin ($) Susceptible <1 ug/ml WALT Preliminary                   Lactic acid, plasma [[id number]]  (Normal) Collected:  03/04/19 0520    Lab Status:  Final result Specimen:  Blood from Arm, Left Updated:  03/04/19 0558     LACTIC ACID 1 9 mmol/L     Narrative:       Result may be elevated if tourniquet was used during collection      Urine Microscopic [[id number]]  (Abnormal) Collected:  03/04/19 0524    Lab Status:  Final result Specimen:  Urine, Indwelling Hays Catheter Updated:  03/04/19 0554     RBC, UA 10-20 /hpf      WBC, UA Innumerable /hpf      Epithelial Cells Occasional /hpf      Bacteria, UA Innumerable /hpf      OTHER OBSERVATIONS WBCs Clumped    Troponin I [[id number]]  (Normal) Collected:  03/04/19 0500    Lab Status:  Final result Specimen:  Blood from Arm, Left Updated:  03/04/19 0529     Troponin I <0 02 ng/mL     CMP [[id number]]  (Abnormal) Collected:  03/04/19 0500    Lab Status:  Final result Specimen:  Blood from Arm, Left Updated:  03/04/19 0527     Sodium 137 mmol/L      Potassium 4 1 mmol/L      Chloride 104 mmol/L      CO2 26 mmol/L      ANION GAP 7 mmol/L      BUN 21 mg/dL      Creatinine 1 06 mg/dL      Glucose 126 mg/dL Calcium 9 0 mg/dL      AST 11 U/L      ALT 16 U/L      Alkaline Phosphatase 198 U/L      Total Protein 7 4 g/dL      Albumin 3 2 g/dL      Total Bilirubin 0 51 mg/dL      eGFR 62 ml/min/1 73sq m     Narrative:       National Kidney Disease Education Program recommendations are as follows:  GFR calculation is accurate only with a steady state creatinine  Chronic Kidney disease less than 60 ml/min/1 73 sq  meters  Kidney failure less than 15 ml/min/1 73 sq  meters      Lipase [[id number]]  (Abnormal) Collected:  03/04/19 0500    Lab Status:  Final result Specimen:  Blood from Arm, Left Updated:  03/04/19 0527     Lipase 68 u/L     POCT urinalysis dipstick [[id number]]  (Normal) Resulted:  03/04/19 0521    Lab Status:  Final result Updated:  03/04/19 0521     Color, UA yellow     Clarity, UA cloudy    ED Urine Macroscopic [[id number]]  (Abnormal) Collected:  03/04/19 0524    Lab Status:  Final result Specimen:  Urine Updated:  03/04/19 0520     Color, UA Yellow     Clarity, UA Cloudy     pH, UA 5 5     Leukocytes, UA Large     Nitrite, UA Negative     Protein,  (2+) mg/dl      Glucose, UA Negative mg/dl      Ketones, UA Negative mg/dl      Urobilinogen, UA 1 0 E U /dl      Bilirubin, UA Negative     Blood, UA Large     Specific [place], UA 1 015    Narrative:       CLINITEK RESULT    CBC and differential [[id number]]  (Abnormal) Collected:  03/04/19 0500    Lab Status:  Final result Specimen:  Blood from Arm, Left Updated:  03/04/19 0507     WBC 13 22 Thousand/uL      RBC 4 23 Million/uL      Hemoglobin 13 0 g/dL      Hematocrit 39 1 %      MCV 92 fL      MCH 30 7 pg      MCHC 33 2 g/dL      RDW 12 9 %      MPV 9 7 fL      Platelets 498 Thousands/uL      nRBC 0 /100 WBCs      Neutrophils Relative 82 %      Immat GRANS % 0 %      Lymphocytes Relative 13 %      Monocytes Relative 4 %      Eosinophils Relative 1 %      Basophils Relative 0 %      Neutrophils Absolute 10 81 Thousands/µL      Immature Grans Absolute 0 05 Thousand/uL      Lymphocytes Absolute 1 69 Thousands/µL      Monocytes Absolute 0 49 Thousand/µL      Eosinophils Absolute 0 16 Thousand/µL      Basophils Absolute 0 02 Thousands/µL                  CT abdomen pelvis with contrast   Final Result by Josh Drummond MD (03/04 1225)      No acute pathology  Colonic diverticulosis without diverticulitis  Large stool in the rectosigmoid which may reflect fecal impaction  Workstation performed: [id number]         XR chest 2 views   ED Interpretation by Shelley Torres MD (03/04 1593)   No acute cardiopulmonary disease      Final Result by Goldy Nogueira MD (81/05 5914)      No evidence of acute abnormality in the chest             Workstation performed: [id number]               Procedures  Procedures      Phone Consults  ED Phone Contact    ED Course           Identification of Seniors at Risk      Most Recent Value   (ISAR) Identification of Seniors at Risk   Before the illness or injury that brought you to the Emergency, did you need someone to help you on a regular basis? 1 Filed at: 03/04/2019 0450   In the last 24 hours, have you needed more help than usual?  0 Filed at: 03/04/2019 6525   Have you been hospitalized for one or more nights during the past 6 months? 0 Filed at: 03/04/2019 0450   In general, do you see well?  0 Filed at: 03/04/2019 0450   In general, do you have serious problems with your memory? 0 Filed at: 03/04/2019 0450   Do you take more than three different medications every day? 1 Filed at: 03/04/2019 0450   ISAR Score  2 Filed at: 03/04/2019 0450                          MDM  Number of Diagnoses or Management Options  Constipation:   UTI (urinary tract infection):   Diagnosis management comments: 70-year-old male with history of hypertension, diabetes, below the knee amputation, urinary retention with indwelling Hays brought from nursing facility to emergency department for abdominal pain  Urine is notable for UTI    He has mild leukocytosis but labs are otherwise reassuring  CT of abdomen and pelvis pending at time of shift change  Discussed plan with oncoming resident agrees to accept the patient for further management  Disposition  Final diagnoses:   UTI (urinary tract infection)   Constipation     Time reflects when diagnosis was documented in both MDM as applicable and the Disposition within this note     Time User Action Codes Description Comment    3/4/2019  6:56 AM Minor, Hanh Add [N39 0] UTI (urinary tract infection)     3/4/2019 12:43 PM Arash Judger Add [K59 00] Constipation       ED Disposition     ED Disposition Condition Date/Time Comment    Discharge Stable Mon Mar 4, 2019 12:33 PM [address] discharge to home/self care              Follow-up Information     Follow up With Specialties Details Why Contact Info    Arben Bautista MD Family Medicine Call in 1 day [facility]  [phone]            Discharge Medication List as of 3/4/2019 12:43 PM      START taking these medications    Details   cephalexin (KEFLEX) 250 mg capsule Take 2 capsules (500 mg total) by mouth 4 (four) times a day for 7 days, Starting Mon 3/4/2019, Until Mon 3/11/2019, Print      polyethylene glycol (GLYCOLAX) powder Take 17 g by mouth daily, Starting Mon 3/4/2019, Print         CONTINUE these medications which have NOT CHANGED    Details   acetaminophen (TYLENOL) 500 mg tablet Take 500 mg by mouth every 6 (six) hours as needed for mild pain, Historical Med      aspirin (ECOTRIN LOW STRENGTH) 81 mg EC tablet Take 81 mg by mouth daily, Historical Med      cholecalciferol (VITAMIN D3) 1,000 units tablet Take 1,000 Units by mouth daily, Historical Med      fluticasone (FLONASE) 50 mcg/act nasal spray fluticasone propionate 50 mcg/actuation nasal spray,suspension, Historical Med      glucose blood test strip test blood sugar & accucheck fasting at 7am and 2 hours after, Historical Med      INSULIN SYRINGE  5CC/28G 28G X 1/2" 0 5 ML MISC Monoject Insulin Safety Syringe 0 5 mL 29 gauge x 1/2", Historical Med      ketoconazole (NIZORAL) 2 % cream Apply topically daily, Historical Med      !! Menthol-Zinc Oxide (CALMOSEPTINE EX) Apply 1 application topically, Historical Med      !! Menthol-Zinc Oxide (RISAMINE EX) Apply topically, Historical Med      metFORMIN (GLUCOPHAGE) 500 mg tablet Take 500 mg by mouth daily with breakfast, Historical Med      NON FORMULARY 1 application as needed, Historical Med      nystatin-triamcinolone (MYCOLOG-II) ointment Apply 1 application topically 2 (two) times a day, Historical Med      pantoprazole (PROTONIX) 40 mg tablet Take 40 mg by mouth 2 (two) times a day, Historical Med      tamsulosin (FLOMAX) 0 4 mg 0 4 mg, Starting Sun 2/13/2011, Historical Med       !! - Potential duplicate medications found  Please discuss with provider  No discharge procedures on file  ED Provider  Attending physically available and evaluated Farzad Khalil  I managed the patient along with the ED Attending      Electronically Signed by         Ree Montano MD  03/06/19 9532

## 2019-03-04 NOTE — ED NOTES
Patient was incontinent of stool  Patient cleaned, linens changed and patient repositioned for comfort        Boyd Bamberger, RN  03/04/19 2151

## 2019-03-04 NOTE — ED ATTENDING ATTESTATION
I, [address] [place] [address], DO, saw and evaluated the patient  I have discussed the patient with the resident/non-physician practitioner and agree with the resident's/non-physician practitioner's findings, Plan of Care, and MDM as documented in the resident's/non-physician practitioner's note, except where noted  All available labs and Radiology studies were reviewed  I was present for key portions of any procedure(s) performed by the resident/non-physician practitioner and I was immediately available to provide assistance  At this point I agree with the current assessment done in the Emergency Department  I have conducted an independent evaluation of this patient a history and physical is as follows:    51-year-old male presents from nursing home with report of constipation, abdominal pain, fever and burning with urination  Patient denies complaints at this time  Does have a Hays catheter in place that has cloudy urine  He denies abdominal pain  On exam-no acute distress, heart regular, lungs clear, abdomen soft and nontender, Hays catheter is in place, left BKA  Plan-CT abdomen, check labs, change Hays catheter and send urine for testing      Critical Care Time  Procedures

## 2019-03-08 LAB
BACTERIA UR CULT: ABNORMAL

## 2019-08-15 ENCOUNTER — APPOINTMENT (OUTPATIENT)
Dept: LAB | Facility: CLINIC | Age: 84
End: 2019-08-15
Payer: MEDICARE

## 2019-08-15 ENCOUNTER — TRANSCRIBE ORDERS (OUTPATIENT)
Dept: LAB | Facility: CLINIC | Age: 84
End: 2019-08-15

## 2019-08-15 DIAGNOSIS — R82.79 URINE CULTURE POSITIVE: ICD-10-CM

## 2019-08-15 DIAGNOSIS — R82.79 URINE CULTURE POSITIVE: Primary | ICD-10-CM

## 2019-08-15 LAB
BACTERIA UR QL AUTO: ABNORMAL /HPF
BILIRUB UR QL STRIP: NEGATIVE
CLARITY UR: ABNORMAL
COLOR UR: ABNORMAL
GLUCOSE UR STRIP-MCNC: NEGATIVE MG/DL
HGB UR QL STRIP.AUTO: ABNORMAL
HYALINE CASTS #/AREA URNS LPF: ABNORMAL /LPF
KETONES UR STRIP-MCNC: NEGATIVE MG/DL
LEUKOCYTE ESTERASE UR QL STRIP: ABNORMAL
NITRITE UR QL STRIP: POSITIVE
NON-SQ EPI CELLS URNS QL MICRO: ABNORMAL /HPF
PH UR STRIP.AUTO: 8.5 [PH]
PROT UR STRIP-MCNC: ABNORMAL MG/DL
RBC #/AREA URNS AUTO: ABNORMAL /HPF
SP GR UR STRIP.AUTO: 1.01 (ref 1–1.03)
UROBILINOGEN UR QL STRIP.AUTO: 1 E.U./DL
WBC #/AREA URNS AUTO: ABNORMAL /HPF

## 2019-08-15 PROCEDURE — 87186 SC STD MICRODIL/AGAR DIL: CPT

## 2019-08-15 PROCEDURE — 87086 URINE CULTURE/COLONY COUNT: CPT

## 2019-08-15 PROCEDURE — 81001 URINALYSIS AUTO W/SCOPE: CPT

## 2019-08-15 PROCEDURE — 87077 CULTURE AEROBIC IDENTIFY: CPT

## 2019-08-18 LAB
BACTERIA UR CULT: ABNORMAL
BACTERIA UR CULT: ABNORMAL

## 2019-09-10 ENCOUNTER — HOSPITAL ENCOUNTER (EMERGENCY)
Facility: HOSPITAL | Age: 84
Discharge: HOME | End: 2019-09-10
Attending: EMERGENCY MEDICINE | Admitting: EMERGENCY MEDICINE
Payer: MEDICARE

## 2019-09-10 VITALS
OXYGEN SATURATION: 96 % | WEIGHT: 170 LBS | RESPIRATION RATE: 18 BRPM | HEART RATE: 69 BPM | TEMPERATURE: 97.7 F | BODY MASS INDEX: 27.44 KG/M2 | DIASTOLIC BLOOD PRESSURE: 63 MMHG | SYSTOLIC BLOOD PRESSURE: 140 MMHG

## 2019-09-10 DIAGNOSIS — R31.9 HEMATURIA: Primary | ICD-10-CM

## 2019-09-10 DIAGNOSIS — N36.8 URETHRAL BLEEDING: ICD-10-CM

## 2019-09-10 DIAGNOSIS — Z97.8 CHRONIC INDWELLING FOLEY CATHETER: ICD-10-CM

## 2019-09-10 LAB
ALBUMIN SERPL BCP-MCNC: 3.3 G/DL (ref 3.5–5)
ALP SERPL-CCNC: 238 U/L (ref 46–116)
ALT SERPL W P-5'-P-CCNC: 18 U/L (ref 12–78)
ANION GAP SERPL CALCULATED.3IONS-SCNC: 4 MMOL/L (ref 4–13)
AST SERPL W P-5'-P-CCNC: 15 U/L (ref 5–45)
BACTERIA UR QL AUTO: ABNORMAL /HPF
BASOPHILS # BLD AUTO: 0.03 THOUSANDS/ΜL (ref 0–0.1)
BASOPHILS NFR BLD AUTO: 0 % (ref 0–1)
BILIRUB SERPL-MCNC: 0.67 MG/DL (ref 0.2–1)
BILIRUB UR QL STRIP: NEGATIVE
BUN SERPL-MCNC: 17 MG/DL (ref 5–25)
CALCIUM SERPL-MCNC: 9.5 MG/DL (ref 8.3–10.1)
CHLORIDE SERPL-SCNC: 103 MMOL/L (ref 100–108)
CLARITY UR: CLEAR
CO2 SERPL-SCNC: 30 MMOL/L (ref 21–32)
COLOR UR: YELLOW
COLOR, POC: NORMAL
CREAT SERPL-MCNC: 0.94 MG/DL (ref 0.6–1.3)
EOSINOPHIL # BLD AUTO: 0.27 THOUSAND/ΜL (ref 0–0.61)
EOSINOPHIL NFR BLD AUTO: 3 % (ref 0–6)
ERYTHROCYTE [DISTWIDTH] IN BLOOD BY AUTOMATED COUNT: 12.9 % (ref 11.6–15.1)
GFR SERPL CREATININE-BSD FRML MDRD: 71 ML/MIN/1.73SQ M
GLUCOSE SERPL-MCNC: 108 MG/DL (ref 65–140)
GLUCOSE UR STRIP-MCNC: NEGATIVE MG/DL
HCT VFR BLD AUTO: 40.6 % (ref 36.5–49.3)
HGB BLD-MCNC: 13.4 G/DL (ref 12–17)
HGB UR QL STRIP.AUTO: ABNORMAL
HYALINE CASTS #/AREA URNS LPF: ABNORMAL /LPF
IMM GRANULOCYTES # BLD AUTO: 0.03 THOUSAND/UL (ref 0–0.2)
IMM GRANULOCYTES NFR BLD AUTO: 0 % (ref 0–2)
KETONES UR STRIP-MCNC: NEGATIVE MG/DL
LEUKOCYTE ESTERASE UR QL STRIP: ABNORMAL
LYMPHOCYTES # BLD AUTO: 1.94 THOUSANDS/ΜL (ref 0.6–4.47)
LYMPHOCYTES NFR BLD AUTO: 22 % (ref 14–44)
MCH RBC QN AUTO: 31.5 PG (ref 26.8–34.3)
MCHC RBC AUTO-ENTMCNC: 33 G/DL (ref 31.4–37.4)
MCV RBC AUTO: 95 FL (ref 82–98)
MONOCYTES # BLD AUTO: 0.55 THOUSAND/ΜL (ref 0.17–1.22)
MONOCYTES NFR BLD AUTO: 6 % (ref 4–12)
NEUTROPHILS # BLD AUTO: 6.17 THOUSANDS/ΜL (ref 1.85–7.62)
NEUTS SEG NFR BLD AUTO: 69 % (ref 43–75)
NITRITE UR QL STRIP: POSITIVE
NON-SQ EPI CELLS URNS QL MICRO: ABNORMAL /HPF
NRBC BLD AUTO-RTO: 0 /100 WBCS
PH UR STRIP.AUTO: 7 [PH]
PLATELET # BLD AUTO: 276 THOUSANDS/UL (ref 149–390)
PMV BLD AUTO: 9.8 FL (ref 8.9–12.7)
POTASSIUM SERPL-SCNC: 4.1 MMOL/L (ref 3.5–5.3)
PROT SERPL-MCNC: 7.9 G/DL (ref 6.4–8.2)
PROT UR STRIP-MCNC: NEGATIVE MG/DL
RBC # BLD AUTO: 4.26 MILLION/UL (ref 3.88–5.62)
RBC #/AREA URNS AUTO: ABNORMAL /HPF
SODIUM SERPL-SCNC: 137 MMOL/L (ref 136–145)
SP GR UR STRIP.AUTO: 1 (ref 1–1.03)
UROBILINOGEN UR QL STRIP.AUTO: 0.2 E.U./DL
WBC # BLD AUTO: 8.99 THOUSAND/UL (ref 4.31–10.16)
WBC #/AREA URNS AUTO: ABNORMAL /HPF

## 2019-09-10 PROCEDURE — 99284 EMERGENCY DEPT VISIT MOD MDM: CPT

## 2019-09-10 PROCEDURE — 36415 COLL VENOUS BLD VENIPUNCTURE: CPT | Performed by: EMERGENCY MEDICINE

## 2019-09-10 PROCEDURE — 87086 URINE CULTURE/COLONY COUNT: CPT | Performed by: EMERGENCY MEDICINE

## 2019-09-10 PROCEDURE — 80053 COMPREHEN METABOLIC PANEL: CPT | Performed by: EMERGENCY MEDICINE

## 2019-09-10 PROCEDURE — 85025 COMPLETE CBC W/AUTO DIFF WBC: CPT | Performed by: EMERGENCY MEDICINE

## 2019-09-10 PROCEDURE — 81001 URINALYSIS AUTO W/SCOPE: CPT | Performed by: EMERGENCY MEDICINE

## 2019-09-10 PROCEDURE — 99284 EMERGENCY DEPT VISIT MOD MDM: CPT | Performed by: EMERGENCY MEDICINE

## 2019-09-10 RX ORDER — NICOTINE POLACRILEX 4 MG
15 LOZENGE BUCCAL ONCE
COMMUNITY
End: 2021-07-13 | Stop reason: HOSPADM

## 2019-09-10 RX ORDER — INSULIN GLARGINE 100 [IU]/ML
INJECTION, SOLUTION SUBCUTANEOUS
COMMUNITY
End: 2021-07-16 | Stop reason: ALTCHOICE

## 2019-09-10 RX ORDER — FERROUS SULFATE 325(65) MG
325 TABLET ORAL
COMMUNITY

## 2019-09-10 RX ORDER — CEPHALEXIN 250 MG/1
500 CAPSULE ORAL ONCE
Status: COMPLETED | OUTPATIENT
Start: 2019-09-10 | End: 2019-09-10

## 2019-09-10 RX ORDER — CLOPIDOGREL BISULFATE 75 MG/1
75 TABLET ORAL DAILY
COMMUNITY
End: 2021-07-13 | Stop reason: HOSPADM

## 2019-09-10 RX ORDER — BUMETANIDE 0.5 MG/1
0.5 TABLET ORAL DAILY
COMMUNITY

## 2019-09-10 RX ADMIN — CEPHALEXIN 500 MG: 250 CAPSULE ORAL at 15:03

## 2019-09-10 NOTE — ED PROVIDER NOTES
History  Chief Complaint   Patient presents with    Urinary Catheter Problem     NOTES BLOOD IN MOHAN AND PAIN WITH URINATION  MOHAN IS NEWLY PLACED     HPI    60-year-old man with a chronic Mohan in for urinary retention setting the  Patient is sent in from facility she lives for bloody urine  Patient has had no dysuria he has had no abdominal pain he has had no fevers he has no pain at the site of his Mohan  Reportedly blood started today they changed a red tinge bag urine  On my evaluation patient has a day that is full to the brim of straw-colored urine  He does have some blood at the meatus of his penis  Mohan has been changed recently but he has had a chronic Mohan for some time    He has no CVA tenderness or mass no history of renal cell carcinoma no history of bladder cancer he does not smoke  He is not on any blood thinners aside from baby aspirin and Plavix    Prior to Admission Medications   Prescriptions Last Dose Informant Patient Reported? Taking?    INSULIN SYRINGE  5CC/28G 28G X 1/2" 0 5 ML MISC   Yes No   Sig: Monoject Insulin Safety Syringe 0 5 mL 29 gauge x 1/2"   Menthol-Zinc Oxide (CALMOSEPTINE EX) Past Week at Unknown time  Yes Yes   Sig: Apply 1 application topically   Menthol-Zinc Oxide (RISAMINE EX) Past Week at Unknown time  Yes Yes   Sig: Apply topically   Mirabegron ER (MYRBETRIQ) 50 MG  at Unknown time  Yes Yes   Sig: Take by mouth   NON FORMULARY   Yes No   Si application as needed   acetaminophen (TYLENOL) 500 mg tablet Past Month at Unknown time  Yes Yes   Sig: Take 500 mg by mouth every 6 (six) hours as needed for mild pain   aspirin (ECOTRIN LOW STRENGTH) 81 mg EC tablet 9/10/2019 at Unknown time  Yes Yes   Sig: Take 81 mg by mouth daily   bumetanide (BUMEX) 0 5 MG tablet 9/10/2019 at Unknown time  Yes Yes   Sig: Take 0 5 mg by mouth daily   cholecalciferol (VITAMIN D3) 1,000 units tablet 9/10/2019 at Unknown time  Yes Yes   Sig: Take 1,000 Units by mouth daily   clopidogrel (PLAVIX) 75 mg tablet 9/10/2019 at Unknown time  Yes Yes   Sig: Take 75 mg by mouth daily   ferrous sulfate 325 (65 Fe) mg tablet 9/10/2019 at Unknown time  Yes Yes   Sig: Take 325 mg by mouth daily with breakfast   fluticasone (FLONASE) 50 mcg/act nasal spray 9/10/2019 at Unknown time  Yes Yes   Sig: fluticasone propionate 50 mcg/actuation nasal spray,suspension   glucose 40 %   Yes Yes   Sig: Take 15 g by mouth once   glucose blood test strip 9/10/2019 at Unknown time  Yes Yes   Sig: test blood sugar & accucheck fasting at 7am and 2 hours after   insulin glargine (LANTUS) 100 units/mL subcutaneous injection 2019 at Unknown time  Yes Yes   Sig: Inject under the skin daily at bedtime   ketoconazole (NIZORAL) 2 % cream 9/10/2019 at Unknown time  Yes Yes   Sig: Apply topically daily   metFORMIN (GLUCOPHAGE) 500 mg tablet 9/10/2019 at Unknown time  Yes Yes   Sig: Take 500 mg by mouth daily with breakfast   nystatin-triamcinolone (MYCOLOG-II) ointment Past Week at Unknown time  Yes Yes   Sig: Apply 1 application topically 2 (two) times a day   pantoprazole (PROTONIX) 40 mg tablet 9/10/2019 at Unknown time  Yes Yes   Sig: Take 40 mg by mouth 2 (two) times a day   polyethylene glycol (GLYCOLAX) powder Past Month at Unknown time  No Yes   Sig: Take 17 g by mouth daily   tamsulosin (FLOMAX) 0 4 mg 2019 at Unknown time  Yes Yes   Si 4 mg      Facility-Administered Medications: None       Past Medical History:   Diagnosis Date    Anemia     Dementia     Diabetes mellitus (HCC)     Diastolic heart failure (HCC)     Hyperlipidemia     Hypertension     Osteoarthritis     PVD (peripheral vascular disease) ([facility] [address] )        Past Surgical History:   Procedure Laterality Date    AMPUTATION Left     left bka       History reviewed  No pertinent family history  I have reviewed and agree with the history as documented      Social History     Tobacco Use    Smoking status: Never Smoker    Smokeless tobacco: Never Used   Substance Use Topics    Alcohol use: No    Drug use: Not on file        Review of Systems   Constitutional: Negative for activity change, chills, diaphoresis, fatigue and fever  HENT: Negative for congestion, postnasal drip, rhinorrhea, sneezing, sore throat and trouble swallowing  Eyes: Negative for visual disturbance  Respiratory: Negative for cough, chest tightness, shortness of breath and wheezing  Cardiovascular: Negative for chest pain and leg swelling  Gastrointestinal: Negative for abdominal distention, abdominal pain, blood in stool, constipation, diarrhea, nausea and vomiting  Genitourinary: Positive for hematuria  Negative for decreased urine volume, dysuria, flank pain, frequency and urgency  Skin: Negative for color change and rash  Neurological: Negative for syncope, weakness, light-headedness and headaches  Psychiatric/Behavioral: Negative for confusion and sleep disturbance  All other systems reviewed and are negative  Physical Exam  ED Triage Vitals   Temperature Pulse Respirations Blood Pressure SpO2   09/10/19 0944 09/10/19 0938 09/10/19 0938 09/10/19 0938 09/10/19 0938   97 7 °F (36 5 °C) 70 17 127/60 98 %      Temp src Heart Rate Source Patient Position - Orthostatic VS BP Location FiO2 (%)   -- 09/10/19 0938 09/10/19 1738 09/10/19 1738 --    Monitor Lying Right arm       Pain Score       09/10/19 0938       5             Orthostatic Vital Signs  Vitals:    09/10/19 1300 09/10/19 1330 09/10/19 1500 09/10/19 1738   BP: 155/64 132/62 127/74 140/63   Pulse: 68 70 71 69   Patient Position - Orthostatic VS:    Lying       Physical Exam   Constitutional: He is oriented to person, place, and time  He appears well-developed and well-nourished  No distress  HENT:   Head: Normocephalic and atraumatic  Nose: Nose normal    Eyes: Pupils are equal, round, and reactive to light  Conjunctivae and EOM are normal  No scleral icterus     Neck: Normal range of motion  Neck supple  No tracheal deviation present  Cardiovascular: Normal rate, regular rhythm, normal heart sounds and intact distal pulses  No murmur heard  Pulmonary/Chest: Effort normal and breath sounds normal  No stridor  No respiratory distress  He has no wheezes  Abdominal: Soft  Bowel sounds are normal  He exhibits no distension  There is no tenderness  There is no guarding  Genitourinary: Rectum normal, prostate normal and penis normal  Rectal exam shows guaiac negative stool  No penile tenderness  Genitourinary Comments: There is dried blood at the meatus    Upon cleaning the dried blood it is replaced by a very slow ooze of new blood  Patient has no pain with manipulation of his  Hays    After the urine that has been change new bag feels with straw-colored urine no evidence of hematuria grossly   Musculoskeletal: Normal range of motion  He exhibits no edema, tenderness or deformity  Amputation below the knee left leg   Neurological: He is alert and oriented to person, place, and time  No cranial nerve deficit or sensory deficit  He exhibits normal muscle tone  Skin: Skin is warm and dry  Capillary refill takes less than 2 seconds  He is not diaphoretic  Psychiatric: He has a normal mood and affect  His behavior is normal  Judgment and thought content normal    Nursing note and vitals reviewed        ED Medications  Medications   cephalexin (KEFLEX) capsule 500 mg (500 mg Oral Given 9/10/19 1503)       Diagnostic Studies  Results Reviewed     Procedure Component Value Units Date/Time    LAB RESULTS [[id number]] Resulted:  09/12/19 1026    Lab Status:  Final result Updated:  09/12/19 1026    Urine culture [[id number]] Collected:  09/10/19 1127    Lab Status:  Final result Specimen:  Urine, Indwelling Hays Catheter Updated:  09/11/19 1202     Urine Culture >100,000 cfu/ml     Urine Microscopic [[id number]]  (Abnormal) Collected:  09/10/19 1127    Lab Status:  Final result Specimen: Urine, Indwelling Hays Catheter Updated:  09/10/19 1144     RBC, UA Innumerable /hpf      WBC, UA 10-20 /hpf      Epithelial Cells None Seen /hpf      Bacteria, UA Occasional /hpf      Hyaline Casts, UA None Seen /lpf     Comprehensive metabolic panel [[id number]]  (Abnormal) Collected:  09/10/19 1115    Lab Status:  Final result Specimen:  Blood from Arm, Right Updated:  09/10/19 1143     Sodium 137 mmol/L      Potassium 4 1 mmol/L      Chloride 103 mmol/L      CO2 30 mmol/L      ANION GAP 4 mmol/L      BUN 17 mg/dL      Creatinine 0 94 mg/dL      Glucose 108 mg/dL      Calcium 9 5 mg/dL      AST 15 U/L      ALT 18 U/L      Alkaline Phosphatase 238 U/L      Total Protein 7 9 g/dL      Albumin 3 3 g/dL      Total Bilirubin 0 67 mg/dL      eGFR 71 ml/min/1 73sq m     Narrative:       National Kidney Disease Foundation guidelines for Chronic Kidney Disease (CKD):     Stage 1 with normal or high GFR (GFR > 90 mL/min/1 73 square meters)    Stage 2 Mild CKD (GFR = 60-89 mL/min/1 73 square meters)    Stage 3A Moderate CKD (GFR = 45-59 mL/min/1 73 square meters)    Stage 3B Moderate CKD (GFR = 30-44 mL/min/1 73 square meters)    Stage 4 Severe CKD (GFR = 15-29 mL/min/1 73 square meters)    Stage 5 End Stage CKD (GFR <15 mL/min/1 73 square meters)  Note: GFR calculation is accurate only with a steady state creatinine    UA w Reflex to Microscopic w Reflex to Culture [[id number]]  (Abnormal) Collected:  09/10/19 1127    Lab Status:  Final result Specimen:  Urine, Indwelling Hays Catheter Updated:  09/10/19 1141     Color, UA Yellow     Clarity, UA Clear     Specific Gravity, UA 1 004     pH, UA 7 0     Leukocytes, UA Large     Nitrite, UA Positive     Protein, UA Negative mg/dl      Glucose, UA Negative mg/dl      Ketones, UA Negative mg/dl      Urobilinogen, UA 0 2 E U /dl      Bilirubin, UA Negative     Blood, UA Large    POCT urinalysis dipstick [[id number]]  (Normal) Resulted:  09/10/19 1124    Lab Status:  Final result Updated:  09/10/19 1124     Color, UA see chart    CBC and differential [[id number]] Collected:  09/10/19 1115    Lab Status:  Final result Specimen:  Blood from Arm, Right Updated:  09/10/19 1123     WBC 8 99 Thousand/uL      RBC 4 26 Million/uL      Hemoglobin 13 4 g/dL      Hematocrit 40 6 %      MCV 95 fL      MCH 31 5 pg      MCHC 33 0 g/dL      RDW 12 9 %      MPV 9 8 fL      Platelets 759 Thousands/uL      nRBC 0 /100 WBCs      Neutrophils Relative 69 %      Immat GRANS % 0 %      Lymphocytes Relative 22 %      Monocytes Relative 6 %      Eosinophils Relative 3 %      Basophils Relative 0 %      Neutrophils Absolute 6 17 Thousands/µL      Immature Grans Absolute 0 03 Thousand/uL      Lymphocytes Absolute 1 94 Thousands/µL      Monocytes Absolute 0 55 Thousand/µL      Eosinophils Absolute 0 27 Thousand/µL      Basophils Absolute 0 03 Thousands/µL                  No orders to display         Procedures  Procedures        ED Course           Identification of Seniors at Risk      Most Recent Value   (ISAR) Identification of Seniors at Risk   Before the illness or injury that brought you to the Emergency, did you need someone to help you on a regular basis? 0 Filed at: 09/10/2019 0939   In the last 24 hours, have you needed more help than usual?  1 Filed at: 09/10/2019 5293   Have you been hospitalized for one or more nights during the past 6 months? 0 Filed at: 09/10/2019 0939   In general, do you see well?  0 Filed at: 09/10/2019 0939   In general, do you have serious problems with your memory? 0 Filed at: 09/10/2019 4901   Do you take more than three different medications every day?   1 Filed at: 09/10/2019 5428   ISAR Score  2 Filed at: 09/10/2019 6069                          MDM  Number of Diagnoses or Management Options  Chronic indwelling Hays catheter:   Hematuria:   Urethral bleeding:   Diagnosis management comments: Patient has screening labs and has no appreciable anemia    Patient's Hays is flushed and no clots return for is having no trouble urinating to full    meatus is cleaned and Vaseline is close there    Urology was called they suggest that this is to be expected that they will see him in clinic      Disposition  Final diagnoses:   Hematuria   Urethral bleeding   Chronic indwelling Hays catheter     Time reflects when diagnosis was documented in both MDM as applicable and the Disposition within this note     Time User Action Codes Description Comment    9/10/2019  1:53 PM Yuliet Ugandan Add [R31 9] Hematuria     9/10/2019  1:53 PM Yuliet Ugandan Add [N36 8] Urethral bleeding     9/10/2019  1:54 PM Yuliet Ugandan Add [Z96 0] Chronic indwelling Hays catheter       ED Disposition     ED Disposition Condition Date/Time Comment    Discharge Stable Tue Sep 10, 2019  1:53 PM Debby Miki Weekse discharge to home/self care              Follow-up Information    None         Discharge Medication List as of 9/10/2019  1:54 PM      CONTINUE these medications which have NOT CHANGED    Details   acetaminophen (TYLENOL) 500 mg tablet Take 500 mg by mouth every 6 (six) hours as needed for mild pain, Historical Med      aspirin (ECOTRIN LOW STRENGTH) 81 mg EC tablet Take 81 mg by mouth daily, Historical Med      bumetanide (BUMEX) 0 5 MG tablet Take 0 5 mg by mouth daily, Historical Med      cholecalciferol (VITAMIN D3) 1,000 units tablet Take 1,000 Units by mouth daily, Historical Med      clopidogrel (PLAVIX) 75 mg tablet Take 75 mg by mouth daily, Historical Med      ferrous sulfate 325 (65 Fe) mg tablet Take 325 mg by mouth daily with breakfast, Historical Med      fluticasone (FLONASE) 50 mcg/act nasal spray fluticasone propionate 50 mcg/actuation nasal spray,suspension, Historical Med      glucose 40 % Take 15 g by mouth once, Historical Med      glucose blood test strip test blood sugar & accucheck fasting at 7am and 2 hours after, Historical Med      insulin glargine (LANTUS) 100 units/mL subcutaneous injection Inject under the skin daily at bedtime, Historical Med      ketoconazole (NIZORAL) 2 % cream Apply topically daily, Historical Med      Menthol-Zinc Oxide (CALMOSEPTINE EX) Apply 1 application topically, Historical Med      Menthol-Zinc Oxide (RISAMINE EX) Apply topically, Historical Med      metFORMIN (GLUCOPHAGE) 500 mg tablet Take 500 mg by mouth daily with breakfast, Historical Med      Mirabegron ER (MYRBETRIQ) 50 MG TB24 Take by mouth, Historical Med      nystatin-triamcinolone (MYCOLOG-II) ointment Apply 1 application topically 2 (two) times a day, Historical Med      pantoprazole (PROTONIX) 40 mg tablet Take 40 mg by mouth 2 (two) times a day, Historical Med      polyethylene glycol (GLYCOLAX) powder Take 17 g by mouth daily, Starting Mon 3/4/2019, Print      tamsulosin (FLOMAX) 0 4 mg 0 4 mg, Starting Sun 2/13/2011, Historical Med      INSULIN SYRINGE  5CC/28G 28G X 1/2" 0 5 ML MISC Monoject Insulin Safety Syringe 0 5 mL 29 gauge x 1/2", Historical Med      NON FORMULARY 1 application as needed, Historical Med           No discharge procedures on file  ED Provider  Attending physically available and evaluated Krystle Valera I managed the patient along with the ED Attending      Electronically Signed by         Irene Gracia MD  09/15/19 0664

## 2019-09-10 NOTE — ED ATTENDING ATTESTATION
Mike Arboleda MD, saw and evaluated the patient  I have discussed the patient with the resident/non-physician practitioner and agree with the resident's/non-physician practitioner's findings, Plan of Care, and MDM as documented in the resident's/non-physician practitioner's note, except where noted  All available labs and Radiology studies were reviewed  I was present for key portions of any procedure(s) performed by the resident/non-physician practitioner and I was immediately available to provide assistance  At this point I agree with the current assessment done in the Emergency Department  I have conducted an independent evaluation of this patient a history and physical is as follows: This is evaluation a 19-year-old male with history of hypertension hyperlipidemia heart failure diabetes and peripheral vascular disease all aspirin as well as Plavix who presents to the emergency department secondary to concern of blood in Hays and around tip of the meatus  Patient states that he noted some blood this morning, has had similar previously  No urinary sxms  He denies any associated abdominal or back pain  No nausea no vomiting  No known fevers or chills  Patient states that it has happened previously  On exam patient is currently in no distress  Vital signs stable  HEENT is normocephalic atraumatic with moist mucous membranes and clear sclera and conjunctiva  Heart is regular rate  Lungs are clear  Abdomen is soft nontender nondistended positive bowel sounds no rebound or guarding no CVA tenderness to palpation  Hays catheter appears to be in place  There is slight dried blood at the meatus  No areas of lacerations appreciated  No hemorrhage from the tip of the meatus  Urine in bag that is full appears to be clear  Patient denies any pain currently  Intact distal pulses and capillary refill less than 2 seconds  Patient is oriented and follows commands    Assessment and plan concern for hematuria  Patient is on Plavix and aspirin  There are no blood clots or gross hematuria at this time  Some slight dried blood at the meatus, cleaned and again noted a small amount of blood at the meatus  Will give Hays care  Will check urinalysis as well as renal function  Will discuss with Urology  Will dispo accordingly  Pt with re-evaluation of bleeding pending at end of shift with anticipation to dc to home if controlled given otherwise asxm, labs and discussion with urology  Portions of the record may have been created with voice recognition software  Occasional wrong word or sound-a-like" substitutions may have occurred due to the inherent limitations of voice recognition software  Review chart carefully and recognize, using context, where substitutions have occurred      Critical Care Time  Procedures

## 2019-09-10 NOTE — SOCIAL WORK
CM consulted to arrange transportation for Pt  CM contacted Erica Reeves at [facility] who reported they will be able to pickup Pt around [phone]  Pt, and medical team aware

## 2019-09-10 NOTE — ED NOTES
Larry, from [facility], coming to transport patient back to facility     [facility]  09/10/19 7849

## 2019-09-11 LAB — BACTERIA UR CULT: NORMAL

## 2020-01-07 ENCOUNTER — TRANSCRIBE ORDERS (OUTPATIENT)
Dept: URGENT CARE | Facility: CLINIC | Age: 85
End: 2020-01-07

## 2020-01-07 DIAGNOSIS — J06.9 ACUTE RESPIRATORY DISEASE: Primary | ICD-10-CM

## 2020-01-09 ENCOUNTER — TRANSCRIBE ORDERS (OUTPATIENT)
Dept: URGENT CARE | Facility: CLINIC | Age: 85
End: 2020-01-09

## 2020-01-09 ENCOUNTER — APPOINTMENT (OUTPATIENT)
Dept: LAB | Facility: CLINIC | Age: 85
End: 2020-01-09
Payer: MEDICARE

## 2020-01-09 DIAGNOSIS — J06.9 ACUTE URI: Primary | ICD-10-CM

## 2020-01-09 PROCEDURE — 87631 RESP VIRUS 3-5 TARGETS: CPT | Performed by: FAMILY MEDICINE

## 2020-01-10 LAB
FLUAV RNA NPH QL NAA+PROBE: ABNORMAL
FLUBV RNA NPH QL NAA+PROBE: DETECTED
RSV RNA NPH QL NAA+PROBE: ABNORMAL

## 2020-03-10 ENCOUNTER — HOSPITAL ENCOUNTER (EMERGENCY)
Facility: HOSPITAL | Age: 85
Discharge: HOME | End: 2020-03-11
Attending: EMERGENCY MEDICINE
Payer: MEDICARE

## 2020-03-10 VITALS
BODY MASS INDEX: 27.44 KG/M2 | DIASTOLIC BLOOD PRESSURE: 103 MMHG | HEART RATE: 110 BPM | WEIGHT: 170 LBS | SYSTOLIC BLOOD PRESSURE: 216 MMHG | OXYGEN SATURATION: 99 % | RESPIRATION RATE: 22 BRPM | TEMPERATURE: 97.4 F

## 2020-03-10 DIAGNOSIS — R33.9 URINARY RETENTION: Primary | ICD-10-CM

## 2020-03-10 PROCEDURE — 99285 EMERGENCY DEPT VISIT HI MDM: CPT | Performed by: EMERGENCY MEDICINE

## 2020-03-10 PROCEDURE — 99283 EMERGENCY DEPT VISIT LOW MDM: CPT

## 2020-03-10 PROCEDURE — 96374 THER/PROPH/DIAG INJ IV PUSH: CPT

## 2020-03-10 RX ORDER — TAMSULOSIN HYDROCHLORIDE 0.4 MG/1
0.4 CAPSULE ORAL ONCE
Status: COMPLETED | OUTPATIENT
Start: 2020-03-10 | End: 2020-03-10

## 2020-03-10 RX ORDER — HYDROMORPHONE HCL/PF 1 MG/ML
0.5 SYRINGE (ML) INJECTION ONCE
Status: COMPLETED | OUTPATIENT
Start: 2020-03-10 | End: 2020-03-10

## 2020-03-10 RX ORDER — LIDOCAINE HYDROCHLORIDE 20 MG/ML
1 JELLY TOPICAL ONCE
Status: COMPLETED | OUTPATIENT
Start: 2020-03-10 | End: 2020-03-10

## 2020-03-10 RX ADMIN — TAMSULOSIN HYDROCHLORIDE 0.4 MG: 0.4 CAPSULE ORAL at 20:32

## 2020-03-10 RX ADMIN — LIDOCAINE HYDROCHLORIDE 1 APPLICATION: 20 JELLY TOPICAL at 20:27

## 2020-03-10 RX ADMIN — HYDROMORPHONE HYDROCHLORIDE 0.5 MG: 1 INJECTION, SOLUTION INTRAMUSCULAR; INTRAVENOUS; SUBCUTANEOUS at 20:02

## 2020-03-10 NOTE — ED PROVIDER NOTES
History  Chief Complaint   Patient presents with    Urinary Retention     Patient went to the urologist today and had his cath changed out  Since 1p he has only 150ml of output  Complaining of lower abdominal pain and penis pain  HPI   Patient is a 19-year-old male history of diabetes, hypertension, dementia, urinary retention presenting for evaluation of urinary retention despite Hays catheter placement  Patient had his urinary catheter changed out today and has had decreased urine output since that time  Patient complaining of lower abdominal distension and pain  Patient has been eating, drinking, passing bowel movements normally  Patient denies fevers, chills, nausea, vomiting, any additional constitutional symptoms  Patient was at his baseline health prior to this appointment earlier today  Prior to Admission Medications   Prescriptions Last Dose Informant Patient Reported? Taking?    INSULIN SYRINGE  5CC/28G 28G X 1/2" 0 5 ML MISC   Yes No   Sig: Monoject Insulin Safety Syringe 0 5 mL 29 gauge x 1/2"   Menthol-Zinc Oxide (CALMOSEPTINE EX)   Yes No   Sig: Apply 1 application topically   Menthol-Zinc Oxide (RISAMINE EX)   Yes No   Sig: Apply topically   Mirabegron ER (MYRBETRIQ) 50 MG TB24   Yes No   Sig: Take by mouth   NON FORMULARY   Yes No   Si application as needed   acetaminophen (TYLENOL) 500 mg tablet   Yes No   Sig: Take 500 mg by mouth every 6 (six) hours as needed for mild pain   aspirin (ECOTRIN LOW STRENGTH) 81 mg EC tablet   Yes No   Sig: Take 81 mg by mouth daily   bumetanide (BUMEX) 0 5 MG tablet   Yes No   Sig: Take 0 5 mg by mouth daily   cholecalciferol (VITAMIN D3) 1,000 units tablet   Yes No   Sig: Take 1,000 Units by mouth daily   clopidogrel (PLAVIX) 75 mg tablet   Yes No   Sig: Take 75 mg by mouth daily   ferrous sulfate 325 (65 Fe) mg tablet   Yes No   Sig: Take 325 mg by mouth daily with breakfast   fluticasone (FLONASE) 50 mcg/act nasal spray   Yes No   Sig: fluticasone propionate 50 mcg/actuation nasal spray,suspension   glucose 40 %   Yes No   Sig: Take 15 g by mouth once   glucose blood test strip   Yes No   Sig: test blood sugar & accucheck fasting at 7am and 2 hours after   insulin glargine (LANTUS) 100 units/mL subcutaneous injection   Yes No   Sig: Inject under the skin daily at bedtime   ketoconazole (NIZORAL) 2 % cream   Yes No   Sig: Apply topically daily   metFORMIN (GLUCOPHAGE) 500 mg tablet   Yes No   Sig: Take 500 mg by mouth daily with breakfast   nystatin-triamcinolone (MYCOLOG-II) ointment   Yes No   Sig: Apply 1 application topically 2 (two) times a day   pantoprazole (PROTONIX) 40 mg tablet   Yes No   Sig: Take 40 mg by mouth 2 (two) times a day   polyethylene glycol (GLYCOLAX) powder   No No   Sig: Take 17 g by mouth daily   tamsulosin (FLOMAX) 0 4 mg   Yes No   Si 4 mg      Facility-Administered Medications: None       Past Medical History:   Diagnosis Date    Anemia     Dementia ([facility] [address] )     Diabetes mellitus (HCC)     Diastolic heart failure (HCC)     Hyperlipidemia     Hypertension     Osteoarthritis     PVD (peripheral vascular disease) (HCC)        Past Surgical History:   Procedure Laterality Date    AMPUTATION Left     left bka       History reviewed  No pertinent family history  I have reviewed and agree with the history as documented  E-Cigarette/Vaping    E-Cigarette Use Never User      E-Cigarette/Vaping Substances     Social History     Tobacco Use    Smoking status: Never Smoker    Smokeless tobacco: Never Used   Substance Use Topics    Alcohol use: No    Drug use: Never        Review of Systems   Constitutional: Negative for chills and fever  HENT: Negative for sore throat  Eyes: Negative for photophobia and visual disturbance  Respiratory: Negative for cough, chest tightness, shortness of breath and wheezing  Cardiovascular: Negative for chest pain, palpitations and leg swelling     Gastrointestinal: Positive for abdominal pain  Negative for abdominal distention, constipation, diarrhea, nausea and vomiting  Genitourinary: Positive for difficulty urinating  Negative for decreased urine volume, dysuria, flank pain, hematuria and urgency  Musculoskeletal: Negative for gait problem, joint swelling and myalgias  Skin: Negative for color change, pallor, rash and wound  Neurological: Negative for syncope, weakness, numbness and headaches  Psychiatric/Behavioral: Negative for confusion  Physical Exam  ED Triage Vitals   Temperature Pulse Respirations Blood Pressure SpO2   03/10/20 1847 03/10/20 1845 03/10/20 1845 03/10/20 1845 03/10/20 1845   (!) 97 4 °F (36 3 °C) (!) 110 22 (!) 216/103 99 %      Temp Source Heart Rate Source Patient Position - Orthostatic VS BP Location FiO2 (%)   03/10/20 1847 03/10/20 1845 03/10/20 1845 03/10/20 1845 --   Oral Monitor Lying Right arm       Pain Score       03/10/20 1845       Worst Possible Pain             Orthostatic Vital Signs  Vitals:    03/10/20 1845   BP: (!) 216/103   Pulse: (!) 110   Patient Position - Orthostatic VS: Lying       Physical Exam   Constitutional: He is oriented to person, place, and time  He appears well-developed and well-nourished  No distress  HENT:   Head: Normocephalic and atraumatic  Right Ear: External ear normal    Left Ear: External ear normal    Nose: Nose normal    Mouth/Throat: Oropharynx is clear and moist  No oropharyngeal exudate  Eyes: Pupils are equal, round, and reactive to light  Conjunctivae are normal    Cardiovascular: Normal rate, regular rhythm, normal heart sounds and intact distal pulses  Exam reveals no gallop and no friction rub  No murmur heard  Pulmonary/Chest: Effort normal and breath sounds normal  No respiratory distress  He has no wheezes  He exhibits no tenderness  Abdominal: Soft  Bowel sounds are normal  He exhibits no distension and no mass  There is no tenderness  There is no rebound and no guarding  Genitourinary:   Genitourinary Comments: Suprapubic fullness  POCUS demonstrating roughly 450 mL in bladder  Scant urine, small amount blood in leg bag   Musculoskeletal: He exhibits no edema or deformity  Neurological: He is alert and oriented to person, place, and time  Skin: Skin is warm and dry  Capillary refill takes less than 2 seconds  He is not diaphoretic  Psychiatric: He has a normal mood and affect  His behavior is normal    Nursing note and vitals reviewed  ED Medications  Medications   tamsulosin (FLOMAX) capsule 0 4 mg (0 4 mg Oral Given 3/10/20 2032)   lidocaine (URO-JET) 2 % urethral/mucosal gel 1 application (1 application Urethral Given 3/10/20 2027)   HYDROmorphone (DILAUDID) injection 0 5 mg (0 5 mg Intravenous Given 3/10/20 2002)       Diagnostic Studies  Results Reviewed     None                 No orders to display         Procedures  Procedures      ED Course           Identification of Seniors at Risk      Most Recent Value   (ISAR) Identification of Seniors at Risk   Before the illness or injury that brought you to the Emergency, did you need someone to help you on a regular basis? 1 Filed at: 03/10/2020 1846   In the last 24 hours, have you needed more help than usual?  1 Filed at: 03/10/2020 1846   Have you been hospitalized for one or more nights during the past 6 months? 1 Filed at: 03/10/2020 1846   In general, do you see well?  0 Filed at: 03/10/2020 1846   In general, do you have serious problems with your memory? 1 Filed at: 03/10/2020 1846   Do you take more than three different medications every day? 1 Filed at: 03/10/2020 1846   ISAR Score  5 Filed at: 03/10/2020 1846                          MDM  Number of Diagnoses or Management Options  Urinary retention:   Diagnosis management comments: 44-year-old male presenting for urinary retention despite Hays, full bladder on point of care ultrasound without drainage into Hays bag    Attempts to walk bag allowing easy flushing but not returning saline, concerning for placement of Hays in full tract or dilation of bulb in urethra  Hays removed  New Hays easily placed with output of roughly 400 cc of urine, dramatic improvement of patient's symptoms, discharged back to care facility with return precautions       Amount and/or Complexity of Data Reviewed  Decide to obtain previous medical records or to obtain history from someone other than the patient: yes  Obtain history from someone other than the patient: yes  Review and summarize past medical records: yes    Risk of Complications, Morbidity, and/or Mortality  Presenting problems: moderate  Diagnostic procedures: low  Management options: minimal    Patient Progress  Patient progress: improved        Disposition  Final diagnoses:   Urinary retention     Time reflects when diagnosis was documented in both MDM as applicable and the Disposition within this note     Time User Action Codes Description Comment    3/10/2020  8:42 PM Tracy Solis Add [R33 9] Urinary retention       ED Disposition     ED Disposition Condition Date/Time Comment    Discharge Stable Tu Mar 10, 2020  8:42 PM [address] Cardenas discharge to home/self care  Follow-up Information     Follow up With Specialties Details Why Contact Info    Idalmis Santos MD [facility]  [address]   [phone]            Patient's Medications   Discharge Prescriptions    No medications on file     No discharge procedures on file  PDMP Review     None           ED Provider  Attending physically available and evaluated [address] Cardenas I managed the patient along with the ED Attending      Electronically Signed by         Raven Holliday MD  03/11/20 2189

## 2020-03-11 NOTE — DISCHARGE INSTRUCTIONS
Please follow-up with primary care provider in 1 week  Return to emergency department if additional episode of urinary retention or persistent dhiraj blood from Hays catheter

## 2020-03-12 NOTE — ED ATTENDING ATTESTATION
3/10/2020  IRosalinda MD, saw and evaluated the patient  I have discussed the patient with the resident/non-physician practitioner and agree with the resident's/non-physician practitioner's findings, Plan of Care, and MDM as documented in the resident's/non-physician practitioner's note, except where noted  All available labs and Radiology studies were reviewed  I was present for key portions of any procedure(s) performed by the resident/non-physician practitioner and I was immediately available to provide assistance  At this point I agree with the current assessment done in the Emergency Department  I have conducted an independent evaluation of this patient a history and physical is as follows:    Patient presents with acute urinary retention status post recent catheter change in Urology office today  Unable to flush Hays catheter in emergency department  Ultrasound shows approximately 300 for CC the urinary bladder  Hays catheter was successfully changed emergency department with approximately 4 cc of urine drainage patient's symptoms improved  Stable for discharge to home close follow-up primary care provider and his urologist   Return precautions given      ED Course         Critical Care Time  Procedures

## 2020-06-25 ENCOUNTER — HOSPITAL ENCOUNTER (EMERGENCY)
Facility: HOSPITAL | Age: 85
Discharge: HOME | End: 2020-06-25
Attending: EMERGENCY MEDICINE
Payer: MEDICARE

## 2020-06-25 VITALS
HEART RATE: 98 BPM | SYSTOLIC BLOOD PRESSURE: 174 MMHG | DIASTOLIC BLOOD PRESSURE: 84 MMHG | RESPIRATION RATE: 20 BRPM | TEMPERATURE: 97.5 F | OXYGEN SATURATION: 97 %

## 2020-06-25 DIAGNOSIS — T83.9XXA FOLEY CATHETER PROBLEM (HCC): Primary | ICD-10-CM

## 2020-06-25 PROCEDURE — 99282 EMERGENCY DEPT VISIT SF MDM: CPT | Performed by: EMERGENCY MEDICINE

## 2020-06-25 PROCEDURE — 99283 EMERGENCY DEPT VISIT LOW MDM: CPT

## 2020-07-13 ENCOUNTER — LAB REQUISITION (OUTPATIENT)
Dept: LAB | Facility: HOSPITAL | Age: 85
End: 2020-07-13
Payer: MEDICARE

## 2020-07-13 DIAGNOSIS — R50.9 FEVER, UNSPECIFIED: ICD-10-CM

## 2020-07-13 DIAGNOSIS — R06.02 SHORTNESS OF BREATH: ICD-10-CM

## 2020-07-13 DIAGNOSIS — R05.9 COUGH: ICD-10-CM

## 2020-07-13 PROCEDURE — U0003 INFECTIOUS AGENT DETECTION BY NUCLEIC ACID (DNA OR RNA); SEVERE ACUTE RESPIRATORY SYNDROME CORONAVIRUS 2 (SARS-COV-2) (CORONAVIRUS DISEASE [COVID-19]), AMPLIFIED PROBE TECHNIQUE, MAKING USE OF HIGH THROUGHPUT TECHNOLOGIES AS DESCRIBED BY CMS-2020-01-R: HCPCS | Performed by: FAMILY MEDICINE

## 2020-07-14 LAB — SARS-COV-2 N GENE RESP QL NAA+PROBE: NEGATIVE

## 2020-08-11 PROCEDURE — U0003 INFECTIOUS AGENT DETECTION BY NUCLEIC ACID (DNA OR RNA); SEVERE ACUTE RESPIRATORY SYNDROME CORONAVIRUS 2 (SARS-COV-2) (CORONAVIRUS DISEASE [COVID-19]), AMPLIFIED PROBE TECHNIQUE, MAKING USE OF HIGH THROUGHPUT TECHNOLOGIES AS DESCRIBED BY CMS-2020-01-R: HCPCS | Performed by: FAMILY MEDICINE

## 2020-08-12 ENCOUNTER — LAB REQUISITION (OUTPATIENT)
Dept: LAB | Facility: HOSPITAL | Age: 85
End: 2020-08-12
Payer: MEDICARE

## 2020-08-12 DIAGNOSIS — Z11.59 ENCOUNTER FOR SCREENING FOR OTHER VIRAL DISEASES: ICD-10-CM

## 2020-08-12 DIAGNOSIS — Z03.818 ENCOUNTER FOR OBSERVATION FOR SUSPECTED EXPOSURE TO OTHER BIOLOGICAL AGENTS RULED OUT: ICD-10-CM

## 2020-08-12 LAB — SARS-COV-2 RNA RESP QL NAA+PROBE: NEGATIVE

## 2020-08-24 ENCOUNTER — LAB REQUISITION (OUTPATIENT)
Dept: LAB | Facility: HOSPITAL | Age: 85
End: 2020-08-24
Payer: MEDICARE

## 2020-08-24 DIAGNOSIS — Z11.59 ENCOUNTER FOR SCREENING FOR OTHER VIRAL DISEASES: ICD-10-CM

## 2020-08-24 DIAGNOSIS — Z03.818 ENCOUNTER FOR OBSERVATION FOR SUSPECTED EXPOSURE TO OTHER BIOLOGICAL AGENTS RULED OUT: ICD-10-CM

## 2020-08-24 LAB — SARS-COV-2 N GENE RESP QL NAA+PROBE: NEGATIVE

## 2020-08-24 PROCEDURE — U0003 INFECTIOUS AGENT DETECTION BY NUCLEIC ACID (DNA OR RNA); SEVERE ACUTE RESPIRATORY SYNDROME CORONAVIRUS 2 (SARS-COV-2) (CORONAVIRUS DISEASE [COVID-19]), AMPLIFIED PROBE TECHNIQUE, MAKING USE OF HIGH THROUGHPUT TECHNOLOGIES AS DESCRIBED BY CMS-2020-01-R: HCPCS | Performed by: FAMILY MEDICINE

## 2020-09-07 PROCEDURE — U0003 INFECTIOUS AGENT DETECTION BY NUCLEIC ACID (DNA OR RNA); SEVERE ACUTE RESPIRATORY SYNDROME CORONAVIRUS 2 (SARS-COV-2) (CORONAVIRUS DISEASE [COVID-19]), AMPLIFIED PROBE TECHNIQUE, MAKING USE OF HIGH THROUGHPUT TECHNOLOGIES AS DESCRIBED BY CMS-2020-01-R: HCPCS | Performed by: FAMILY MEDICINE

## 2020-09-08 ENCOUNTER — LAB REQUISITION (OUTPATIENT)
Dept: LAB | Facility: HOSPITAL | Age: 85
End: 2020-09-08
Payer: MEDICARE

## 2020-09-08 DIAGNOSIS — U07.1 COVID-19: ICD-10-CM

## 2020-09-08 LAB — SARS-COV-2 N GENE RESP QL NAA+PROBE: NEGATIVE

## 2020-09-11 PROCEDURE — 87635 SARS-COV-2 COVID-19 AMP PRB: CPT | Performed by: FAMILY MEDICINE

## 2020-09-12 ENCOUNTER — LAB REQUISITION (OUTPATIENT)
Dept: LAB | Facility: HOSPITAL | Age: 85
End: 2020-09-12
Payer: MEDICARE

## 2020-09-12 DIAGNOSIS — Z11.59 ENCOUNTER FOR SCREENING FOR OTHER VIRAL DISEASES: ICD-10-CM

## 2020-09-12 DIAGNOSIS — Z03.818 ENCOUNTER FOR OBSERVATION FOR SUSPECTED EXPOSURE TO OTHER BIOLOGICAL AGENTS RULED OUT: ICD-10-CM

## 2020-09-12 LAB — SARS-COV-2 RNA RESP QL NAA+PROBE: NEGATIVE

## 2020-09-18 ENCOUNTER — LAB REQUISITION (OUTPATIENT)
Dept: LAB | Facility: HOSPITAL | Age: 85
End: 2020-09-18
Payer: MEDICARE

## 2020-09-18 DIAGNOSIS — Z11.59 ENCOUNTER FOR SCREENING FOR OTHER VIRAL DISEASES: ICD-10-CM

## 2020-09-18 DIAGNOSIS — Z03.818 ENCOUNTER FOR OBSERVATION FOR SUSPECTED EXPOSURE TO OTHER BIOLOGICAL AGENTS RULED OUT: ICD-10-CM

## 2020-09-18 PROCEDURE — U0003 INFECTIOUS AGENT DETECTION BY NUCLEIC ACID (DNA OR RNA); SEVERE ACUTE RESPIRATORY SYNDROME CORONAVIRUS 2 (SARS-COV-2) (CORONAVIRUS DISEASE [COVID-19]), AMPLIFIED PROBE TECHNIQUE, MAKING USE OF HIGH THROUGHPUT TECHNOLOGIES AS DESCRIBED BY CMS-2020-01-R: HCPCS | Performed by: FAMILY MEDICINE

## 2020-09-19 LAB — SARS-COV-2 N GENE RESP QL NAA+PROBE: NEGATIVE

## 2020-09-23 ENCOUNTER — LAB REQUISITION (OUTPATIENT)
Dept: LAB | Facility: HOSPITAL | Age: 85
End: 2020-09-23
Payer: MEDICARE

## 2020-09-23 DIAGNOSIS — Z03.818 ENCOUNTER FOR OBSERVATION FOR SUSPECTED EXPOSURE TO OTHER BIOLOGICAL AGENTS RULED OUT: ICD-10-CM

## 2020-09-23 DIAGNOSIS — Z11.59 ENCOUNTER FOR SCREENING FOR OTHER VIRAL DISEASES: ICD-10-CM

## 2020-09-23 PROCEDURE — 87635 SARS-COV-2 COVID-19 AMP PRB: CPT | Performed by: FAMILY MEDICINE

## 2020-09-24 LAB — SARS-COV-2 RNA RESP QL NAA+PROBE: NEGATIVE

## 2020-09-28 ENCOUNTER — LAB REQUISITION (OUTPATIENT)
Dept: LAB | Facility: HOSPITAL | Age: 85
End: 2020-09-28
Payer: MEDICARE

## 2020-09-28 DIAGNOSIS — Z03.818 ENCOUNTER FOR OBSERVATION FOR SUSPECTED EXPOSURE TO OTHER BIOLOGICAL AGENTS RULED OUT: ICD-10-CM

## 2020-09-28 DIAGNOSIS — Z11.59 ENCOUNTER FOR SCREENING FOR OTHER VIRAL DISEASES: ICD-10-CM

## 2020-09-28 PROCEDURE — 87635 SARS-COV-2 COVID-19 AMP PRB: CPT | Performed by: FAMILY MEDICINE

## 2020-09-29 LAB — SARS-COV-2 RNA RESP QL NAA+PROBE: NEGATIVE

## 2020-10-08 ENCOUNTER — LAB REQUISITION (OUTPATIENT)
Dept: LAB | Facility: HOSPITAL | Age: 85
End: 2020-10-08
Payer: MEDICARE

## 2020-10-08 DIAGNOSIS — Z11.59 ENCOUNTER FOR SCREENING FOR OTHER VIRAL DISEASES: ICD-10-CM

## 2020-10-08 DIAGNOSIS — Z03.818 ENCOUNTER FOR OBSERVATION FOR SUSPECTED EXPOSURE TO OTHER BIOLOGICAL AGENTS RULED OUT: ICD-10-CM

## 2020-10-08 PROCEDURE — U0003 INFECTIOUS AGENT DETECTION BY NUCLEIC ACID (DNA OR RNA); SEVERE ACUTE RESPIRATORY SYNDROME CORONAVIRUS 2 (SARS-COV-2) (CORONAVIRUS DISEASE [COVID-19]), AMPLIFIED PROBE TECHNIQUE, MAKING USE OF HIGH THROUGHPUT TECHNOLOGIES AS DESCRIBED BY CMS-2020-01-R: HCPCS | Performed by: FAMILY MEDICINE

## 2020-10-09 LAB — SARS-COV-2 N GENE RESP QL NAA+PROBE: NEGATIVE

## 2020-10-13 ENCOUNTER — LAB REQUISITION (OUTPATIENT)
Dept: LAB | Facility: HOSPITAL | Age: 85
End: 2020-10-13
Payer: MEDICARE

## 2020-10-13 DIAGNOSIS — Z03.818 ENCOUNTER FOR OBSERVATION FOR SUSPECTED EXPOSURE TO OTHER BIOLOGICAL AGENTS RULED OUT: ICD-10-CM

## 2020-10-13 DIAGNOSIS — Z11.59 ENCOUNTER FOR SCREENING FOR OTHER VIRAL DISEASES: ICD-10-CM

## 2020-10-13 PROCEDURE — 87635 SARS-COV-2 COVID-19 AMP PRB: CPT | Performed by: FAMILY MEDICINE

## 2020-10-14 LAB — SARS-COV-2 RNA RESP QL NAA+PROBE: NEGATIVE

## 2020-10-27 ENCOUNTER — LAB REQUISITION (OUTPATIENT)
Dept: LAB | Facility: HOSPITAL | Age: 85
End: 2020-10-27
Payer: MEDICARE

## 2020-10-27 DIAGNOSIS — Z11.59 ENCOUNTER FOR SCREENING FOR OTHER VIRAL DISEASES: ICD-10-CM

## 2020-10-27 DIAGNOSIS — Z03.818 ENCOUNTER FOR OBSERVATION FOR SUSPECTED EXPOSURE TO OTHER BIOLOGICAL AGENTS RULED OUT: ICD-10-CM

## 2020-10-27 PROCEDURE — U0003 INFECTIOUS AGENT DETECTION BY NUCLEIC ACID (DNA OR RNA); SEVERE ACUTE RESPIRATORY SYNDROME CORONAVIRUS 2 (SARS-COV-2) (CORONAVIRUS DISEASE [COVID-19]), AMPLIFIED PROBE TECHNIQUE, MAKING USE OF HIGH THROUGHPUT TECHNOLOGIES AS DESCRIBED BY CMS-2020-01-R: HCPCS | Performed by: FAMILY MEDICINE

## 2020-10-29 LAB — SARS-COV-2 RNA SPEC QL NAA+PROBE: NOT DETECTED

## 2020-11-12 ENCOUNTER — LAB REQUISITION (OUTPATIENT)
Dept: LAB | Facility: HOSPITAL | Age: 85
End: 2020-11-12
Payer: MEDICARE

## 2020-11-12 DIAGNOSIS — Z11.59 ENCOUNTER FOR SCREENING FOR OTHER VIRAL DISEASES: ICD-10-CM

## 2020-11-12 DIAGNOSIS — Z03.818 ENCOUNTER FOR OBSERVATION FOR SUSPECTED EXPOSURE TO OTHER BIOLOGICAL AGENTS RULED OUT: ICD-10-CM

## 2020-11-12 PROCEDURE — U0003 INFECTIOUS AGENT DETECTION BY NUCLEIC ACID (DNA OR RNA); SEVERE ACUTE RESPIRATORY SYNDROME CORONAVIRUS 2 (SARS-COV-2) (CORONAVIRUS DISEASE [COVID-19]), AMPLIFIED PROBE TECHNIQUE, MAKING USE OF HIGH THROUGHPUT TECHNOLOGIES AS DESCRIBED BY CMS-2020-01-R: HCPCS | Performed by: FAMILY MEDICINE

## 2020-11-14 LAB — SARS-COV-2 RNA SPEC QL NAA+PROBE: NOT DETECTED

## 2020-11-18 PROCEDURE — U0003 INFECTIOUS AGENT DETECTION BY NUCLEIC ACID (DNA OR RNA); SEVERE ACUTE RESPIRATORY SYNDROME CORONAVIRUS 2 (SARS-COV-2) (CORONAVIRUS DISEASE [COVID-19]), AMPLIFIED PROBE TECHNIQUE, MAKING USE OF HIGH THROUGHPUT TECHNOLOGIES AS DESCRIBED BY CMS-2020-01-R: HCPCS | Performed by: FAMILY MEDICINE

## 2020-11-19 ENCOUNTER — LAB REQUISITION (OUTPATIENT)
Dept: LAB | Facility: HOSPITAL | Age: 85
End: 2020-11-19
Payer: MEDICARE

## 2020-11-19 DIAGNOSIS — Z11.59 ENCOUNTER FOR SCREENING FOR OTHER VIRAL DISEASES: ICD-10-CM

## 2020-11-19 DIAGNOSIS — Z03.818 ENCOUNTER FOR OBSERVATION FOR SUSPECTED EXPOSURE TO OTHER BIOLOGICAL AGENTS RULED OUT: ICD-10-CM

## 2020-11-20 LAB — SARS-COV-2 RNA SPEC QL NAA+PROBE: NOT DETECTED

## 2020-11-24 PROCEDURE — U0003 INFECTIOUS AGENT DETECTION BY NUCLEIC ACID (DNA OR RNA); SEVERE ACUTE RESPIRATORY SYNDROME CORONAVIRUS 2 (SARS-COV-2) (CORONAVIRUS DISEASE [COVID-19]), AMPLIFIED PROBE TECHNIQUE, MAKING USE OF HIGH THROUGHPUT TECHNOLOGIES AS DESCRIBED BY CMS-2020-01-R: HCPCS | Performed by: FAMILY MEDICINE

## 2020-11-25 ENCOUNTER — LAB REQUISITION (OUTPATIENT)
Dept: LAB | Facility: HOSPITAL | Age: 85
End: 2020-11-25
Payer: MEDICARE

## 2020-11-25 DIAGNOSIS — Z03.818 ENCOUNTER FOR OBSERVATION FOR SUSPECTED EXPOSURE TO OTHER BIOLOGICAL AGENTS RULED OUT: ICD-10-CM

## 2020-11-25 DIAGNOSIS — Z11.59 ENCOUNTER FOR SCREENING FOR OTHER VIRAL DISEASES: ICD-10-CM

## 2020-11-26 LAB — SARS-COV-2 RNA SPEC QL NAA+PROBE: NOT DETECTED

## 2020-12-01 ENCOUNTER — LAB REQUISITION (OUTPATIENT)
Dept: LAB | Facility: HOSPITAL | Age: 85
End: 2020-12-01
Payer: MEDICARE

## 2020-12-01 DIAGNOSIS — Z11.59 ENCOUNTER FOR SCREENING FOR OTHER VIRAL DISEASES: ICD-10-CM

## 2020-12-01 PROCEDURE — U0003 INFECTIOUS AGENT DETECTION BY NUCLEIC ACID (DNA OR RNA); SEVERE ACUTE RESPIRATORY SYNDROME CORONAVIRUS 2 (SARS-COV-2) (CORONAVIRUS DISEASE [COVID-19]), AMPLIFIED PROBE TECHNIQUE, MAKING USE OF HIGH THROUGHPUT TECHNOLOGIES AS DESCRIBED BY CMS-2020-01-R: HCPCS | Performed by: FAMILY MEDICINE

## 2020-12-03 LAB — SARS-COV-2 RNA SPEC QL NAA+PROBE: NOT DETECTED

## 2020-12-08 ENCOUNTER — LAB REQUISITION (OUTPATIENT)
Dept: LAB | Facility: HOSPITAL | Age: 85
End: 2020-12-08
Payer: MEDICARE

## 2020-12-08 DIAGNOSIS — Z03.818 ENCOUNTER FOR OBSERVATION FOR SUSPECTED EXPOSURE TO OTHER BIOLOGICAL AGENTS RULED OUT: ICD-10-CM

## 2020-12-08 DIAGNOSIS — Z11.59 ENCOUNTER FOR SCREENING FOR OTHER VIRAL DISEASES: ICD-10-CM

## 2020-12-08 PROCEDURE — U0003 INFECTIOUS AGENT DETECTION BY NUCLEIC ACID (DNA OR RNA); SEVERE ACUTE RESPIRATORY SYNDROME CORONAVIRUS 2 (SARS-COV-2) (CORONAVIRUS DISEASE [COVID-19]), AMPLIFIED PROBE TECHNIQUE, MAKING USE OF HIGH THROUGHPUT TECHNOLOGIES AS DESCRIBED BY CMS-2020-01-R: HCPCS | Performed by: FAMILY MEDICINE

## 2020-12-10 LAB — SARS-COV-2 RNA SPEC QL NAA+PROBE: NOT DETECTED

## 2020-12-15 ENCOUNTER — LAB REQUISITION (OUTPATIENT)
Dept: LAB | Facility: HOSPITAL | Age: 85
End: 2020-12-15
Payer: MEDICARE

## 2020-12-15 DIAGNOSIS — Z11.59 ENCOUNTER FOR SCREENING FOR OTHER VIRAL DISEASES: ICD-10-CM

## 2020-12-15 DIAGNOSIS — Z03.818 ENCOUNTER FOR OBSERVATION FOR SUSPECTED EXPOSURE TO OTHER BIOLOGICAL AGENTS RULED OUT: ICD-10-CM

## 2020-12-15 PROCEDURE — U0003 INFECTIOUS AGENT DETECTION BY NUCLEIC ACID (DNA OR RNA); SEVERE ACUTE RESPIRATORY SYNDROME CORONAVIRUS 2 (SARS-COV-2) (CORONAVIRUS DISEASE [COVID-19]), AMPLIFIED PROBE TECHNIQUE, MAKING USE OF HIGH THROUGHPUT TECHNOLOGIES AS DESCRIBED BY CMS-2020-01-R: HCPCS | Performed by: FAMILY MEDICINE

## 2020-12-17 LAB — SARS-COV-2 RNA SPEC QL NAA+PROBE: NOT DETECTED

## 2020-12-22 ENCOUNTER — LAB REQUISITION (OUTPATIENT)
Dept: LAB | Facility: HOSPITAL | Age: 85
End: 2020-12-22
Payer: MEDICARE

## 2020-12-22 DIAGNOSIS — Z11.59 ENCOUNTER FOR SCREENING FOR OTHER VIRAL DISEASES: ICD-10-CM

## 2020-12-22 DIAGNOSIS — Z03.818 ENCOUNTER FOR OBSERVATION FOR SUSPECTED EXPOSURE TO OTHER BIOLOGICAL AGENTS RULED OUT: ICD-10-CM

## 2020-12-22 PROCEDURE — U0003 INFECTIOUS AGENT DETECTION BY NUCLEIC ACID (DNA OR RNA); SEVERE ACUTE RESPIRATORY SYNDROME CORONAVIRUS 2 (SARS-COV-2) (CORONAVIRUS DISEASE [COVID-19]), AMPLIFIED PROBE TECHNIQUE, MAKING USE OF HIGH THROUGHPUT TECHNOLOGIES AS DESCRIBED BY CMS-2020-01-R: HCPCS | Performed by: FAMILY MEDICINE

## 2020-12-23 LAB — SARS-COV-2 RNA SPEC QL NAA+PROBE: NOT DETECTED

## 2020-12-26 PROCEDURE — U0003 INFECTIOUS AGENT DETECTION BY NUCLEIC ACID (DNA OR RNA); SEVERE ACUTE RESPIRATORY SYNDROME CORONAVIRUS 2 (SARS-COV-2) (CORONAVIRUS DISEASE [COVID-19]), AMPLIFIED PROBE TECHNIQUE, MAKING USE OF HIGH THROUGHPUT TECHNOLOGIES AS DESCRIBED BY CMS-2020-01-R: HCPCS | Performed by: FAMILY MEDICINE

## 2020-12-28 ENCOUNTER — LAB REQUISITION (OUTPATIENT)
Dept: LAB | Facility: HOSPITAL | Age: 85
End: 2020-12-28
Payer: MEDICARE

## 2020-12-28 DIAGNOSIS — Z11.59 ENCOUNTER FOR SCREENING FOR OTHER VIRAL DISEASES: ICD-10-CM

## 2020-12-28 DIAGNOSIS — Z03.818 ENCOUNTER FOR OBSERVATION FOR SUSPECTED EXPOSURE TO OTHER BIOLOGICAL AGENTS RULED OUT: ICD-10-CM

## 2020-12-28 LAB — SARS-COV-2 RNA RESP QL NAA+PROBE: NEGATIVE

## 2020-12-29 ENCOUNTER — LAB REQUISITION (OUTPATIENT)
Dept: LAB | Facility: HOSPITAL | Age: 85
End: 2020-12-29
Payer: MEDICARE

## 2020-12-29 DIAGNOSIS — Z11.59 ENCOUNTER FOR SCREENING FOR OTHER VIRAL DISEASES: ICD-10-CM

## 2020-12-29 PROCEDURE — U0003 INFECTIOUS AGENT DETECTION BY NUCLEIC ACID (DNA OR RNA); SEVERE ACUTE RESPIRATORY SYNDROME CORONAVIRUS 2 (SARS-COV-2) (CORONAVIRUS DISEASE [COVID-19]), AMPLIFIED PROBE TECHNIQUE, MAKING USE OF HIGH THROUGHPUT TECHNOLOGIES AS DESCRIBED BY CMS-2020-01-R: HCPCS | Performed by: FAMILY MEDICINE

## 2020-12-30 LAB — SARS-COV-2 RNA RESP QL NAA+PROBE: NEGATIVE

## 2021-01-05 ENCOUNTER — LAB REQUISITION (OUTPATIENT)
Dept: LAB | Facility: HOSPITAL | Age: 86
End: 2021-01-05
Payer: MEDICARE

## 2021-01-05 DIAGNOSIS — Z03.818 ENCOUNTER FOR OBSERVATION FOR SUSPECTED EXPOSURE TO OTHER BIOLOGICAL AGENTS RULED OUT: ICD-10-CM

## 2021-01-05 DIAGNOSIS — Z11.59 ENCOUNTER FOR SCREENING FOR OTHER VIRAL DISEASES: ICD-10-CM

## 2021-01-05 PROCEDURE — U0003 INFECTIOUS AGENT DETECTION BY NUCLEIC ACID (DNA OR RNA); SEVERE ACUTE RESPIRATORY SYNDROME CORONAVIRUS 2 (SARS-COV-2) (CORONAVIRUS DISEASE [COVID-19]), AMPLIFIED PROBE TECHNIQUE, MAKING USE OF HIGH THROUGHPUT TECHNOLOGIES AS DESCRIBED BY CMS-2020-01-R: HCPCS | Performed by: FAMILY MEDICINE

## 2021-01-06 LAB — SARS-COV-2 RNA SPEC QL NAA+PROBE: NOT DETECTED

## 2021-01-12 ENCOUNTER — LAB REQUISITION (OUTPATIENT)
Dept: LAB | Facility: HOSPITAL | Age: 86
End: 2021-01-12
Payer: MEDICARE

## 2021-01-12 DIAGNOSIS — Z03.818 ENCOUNTER FOR OBSERVATION FOR SUSPECTED EXPOSURE TO OTHER BIOLOGICAL AGENTS RULED OUT: ICD-10-CM

## 2021-01-12 DIAGNOSIS — Z11.59 ENCOUNTER FOR SCREENING FOR OTHER VIRAL DISEASES: ICD-10-CM

## 2021-01-12 PROCEDURE — U0003 INFECTIOUS AGENT DETECTION BY NUCLEIC ACID (DNA OR RNA); SEVERE ACUTE RESPIRATORY SYNDROME CORONAVIRUS 2 (SARS-COV-2) (CORONAVIRUS DISEASE [COVID-19]), AMPLIFIED PROBE TECHNIQUE, MAKING USE OF HIGH THROUGHPUT TECHNOLOGIES AS DESCRIBED BY CMS-2020-01-R: HCPCS | Performed by: FAMILY MEDICINE

## 2021-01-12 PROCEDURE — U0005 INFEC AGEN DETEC AMPLI PROBE: HCPCS | Performed by: FAMILY MEDICINE

## 2021-01-13 LAB — SARS-COV-2 RNA SPEC QL NAA+PROBE: NOT DETECTED

## 2021-01-19 ENCOUNTER — LAB REQUISITION (OUTPATIENT)
Dept: LAB | Facility: HOSPITAL | Age: 86
End: 2021-01-19
Payer: MEDICARE

## 2021-01-19 DIAGNOSIS — Z11.59 ENCOUNTER FOR SCREENING FOR OTHER VIRAL DISEASES: ICD-10-CM

## 2021-01-19 DIAGNOSIS — Z03.818 ENCOUNTER FOR OBSERVATION FOR SUSPECTED EXPOSURE TO OTHER BIOLOGICAL AGENTS RULED OUT: ICD-10-CM

## 2021-01-19 PROCEDURE — U0003 INFECTIOUS AGENT DETECTION BY NUCLEIC ACID (DNA OR RNA); SEVERE ACUTE RESPIRATORY SYNDROME CORONAVIRUS 2 (SARS-COV-2) (CORONAVIRUS DISEASE [COVID-19]), AMPLIFIED PROBE TECHNIQUE, MAKING USE OF HIGH THROUGHPUT TECHNOLOGIES AS DESCRIBED BY CMS-2020-01-R: HCPCS | Performed by: FAMILY MEDICINE

## 2021-01-19 PROCEDURE — U0005 INFEC AGEN DETEC AMPLI PROBE: HCPCS | Performed by: FAMILY MEDICINE

## 2021-01-20 LAB — SARS-COV-2 RNA RESP QL NAA+PROBE: NEGATIVE

## 2021-01-26 ENCOUNTER — LAB REQUISITION (OUTPATIENT)
Dept: LAB | Facility: HOSPITAL | Age: 86
End: 2021-01-26
Payer: MEDICARE

## 2021-01-26 DIAGNOSIS — Z11.59 ENCOUNTER FOR SCREENING FOR OTHER VIRAL DISEASES: ICD-10-CM

## 2021-01-26 DIAGNOSIS — Z03.818 ENCOUNTER FOR OBSERVATION FOR SUSPECTED EXPOSURE TO OTHER BIOLOGICAL AGENTS RULED OUT: ICD-10-CM

## 2021-01-26 PROCEDURE — U0003 INFECTIOUS AGENT DETECTION BY NUCLEIC ACID (DNA OR RNA); SEVERE ACUTE RESPIRATORY SYNDROME CORONAVIRUS 2 (SARS-COV-2) (CORONAVIRUS DISEASE [COVID-19]), AMPLIFIED PROBE TECHNIQUE, MAKING USE OF HIGH THROUGHPUT TECHNOLOGIES AS DESCRIBED BY CMS-2020-01-R: HCPCS | Performed by: FAMILY MEDICINE

## 2021-01-26 PROCEDURE — U0005 INFEC AGEN DETEC AMPLI PROBE: HCPCS | Performed by: FAMILY MEDICINE

## 2021-01-27 LAB — SARS-COV-2 RNA RESP QL NAA+PROBE: NEGATIVE

## 2021-02-03 ENCOUNTER — LAB REQUISITION (OUTPATIENT)
Dept: LAB | Facility: HOSPITAL | Age: 86
End: 2021-02-03
Payer: MEDICARE

## 2021-02-03 DIAGNOSIS — Z03.818 ENCOUNTER FOR OBSERVATION FOR SUSPECTED EXPOSURE TO OTHER BIOLOGICAL AGENTS RULED OUT: ICD-10-CM

## 2021-02-03 DIAGNOSIS — Z11.59 ENCOUNTER FOR SCREENING FOR OTHER VIRAL DISEASES: ICD-10-CM

## 2021-02-03 PROCEDURE — U0005 INFEC AGEN DETEC AMPLI PROBE: HCPCS | Performed by: FAMILY MEDICINE

## 2021-02-03 PROCEDURE — U0003 INFECTIOUS AGENT DETECTION BY NUCLEIC ACID (DNA OR RNA); SEVERE ACUTE RESPIRATORY SYNDROME CORONAVIRUS 2 (SARS-COV-2) (CORONAVIRUS DISEASE [COVID-19]), AMPLIFIED PROBE TECHNIQUE, MAKING USE OF HIGH THROUGHPUT TECHNOLOGIES AS DESCRIBED BY CMS-2020-01-R: HCPCS | Performed by: FAMILY MEDICINE

## 2021-02-04 LAB — SARS-COV-2 RNA RESP QL NAA+PROBE: NEGATIVE

## 2021-02-08 ENCOUNTER — LAB REQUISITION (OUTPATIENT)
Dept: LAB | Facility: HOSPITAL | Age: 86
End: 2021-02-08
Payer: MEDICARE

## 2021-02-08 DIAGNOSIS — Z03.818 ENCOUNTER FOR OBSERVATION FOR SUSPECTED EXPOSURE TO OTHER BIOLOGICAL AGENTS RULED OUT: ICD-10-CM

## 2021-02-08 DIAGNOSIS — Z11.59 ENCOUNTER FOR SCREENING FOR OTHER VIRAL DISEASES: ICD-10-CM

## 2021-02-08 PROCEDURE — U0005 INFEC AGEN DETEC AMPLI PROBE: HCPCS | Performed by: FAMILY MEDICINE

## 2021-02-08 PROCEDURE — U0003 INFECTIOUS AGENT DETECTION BY NUCLEIC ACID (DNA OR RNA); SEVERE ACUTE RESPIRATORY SYNDROME CORONAVIRUS 2 (SARS-COV-2) (CORONAVIRUS DISEASE [COVID-19]), AMPLIFIED PROBE TECHNIQUE, MAKING USE OF HIGH THROUGHPUT TECHNOLOGIES AS DESCRIBED BY CMS-2020-01-R: HCPCS | Performed by: FAMILY MEDICINE

## 2021-02-09 LAB — SARS-COV-2 RNA RESP QL NAA+PROBE: NEGATIVE

## 2021-02-16 ENCOUNTER — LAB REQUISITION (OUTPATIENT)
Dept: LAB | Facility: HOSPITAL | Age: 86
End: 2021-02-16
Payer: MEDICARE

## 2021-02-16 DIAGNOSIS — Z11.59 ENCOUNTER FOR SCREENING FOR OTHER VIRAL DISEASES: ICD-10-CM

## 2021-02-16 DIAGNOSIS — Z03.818 ENCOUNTER FOR OBSERVATION FOR SUSPECTED EXPOSURE TO OTHER BIOLOGICAL AGENTS RULED OUT: ICD-10-CM

## 2021-02-16 PROCEDURE — U0003 INFECTIOUS AGENT DETECTION BY NUCLEIC ACID (DNA OR RNA); SEVERE ACUTE RESPIRATORY SYNDROME CORONAVIRUS 2 (SARS-COV-2) (CORONAVIRUS DISEASE [COVID-19]), AMPLIFIED PROBE TECHNIQUE, MAKING USE OF HIGH THROUGHPUT TECHNOLOGIES AS DESCRIBED BY CMS-2020-01-R: HCPCS | Performed by: FAMILY MEDICINE

## 2021-02-16 PROCEDURE — U0005 INFEC AGEN DETEC AMPLI PROBE: HCPCS | Performed by: FAMILY MEDICINE

## 2021-02-17 LAB — SARS-COV-2 RNA RESP QL NAA+PROBE: NEGATIVE

## 2021-02-22 ENCOUNTER — LAB REQUISITION (OUTPATIENT)
Dept: LAB | Facility: HOSPITAL | Age: 86
End: 2021-02-22
Payer: MEDICARE

## 2021-02-22 DIAGNOSIS — Z03.818 ENCOUNTER FOR OBSERVATION FOR SUSPECTED EXPOSURE TO OTHER BIOLOGICAL AGENTS RULED OUT: ICD-10-CM

## 2021-02-22 DIAGNOSIS — Z11.59 ENCOUNTER FOR SCREENING FOR OTHER VIRAL DISEASES: ICD-10-CM

## 2021-02-22 PROCEDURE — U0005 INFEC AGEN DETEC AMPLI PROBE: HCPCS | Performed by: FAMILY MEDICINE

## 2021-02-22 PROCEDURE — U0003 INFECTIOUS AGENT DETECTION BY NUCLEIC ACID (DNA OR RNA); SEVERE ACUTE RESPIRATORY SYNDROME CORONAVIRUS 2 (SARS-COV-2) (CORONAVIRUS DISEASE [COVID-19]), AMPLIFIED PROBE TECHNIQUE, MAKING USE OF HIGH THROUGHPUT TECHNOLOGIES AS DESCRIBED BY CMS-2020-01-R: HCPCS | Performed by: FAMILY MEDICINE

## 2021-02-23 LAB — SARS-COV-2 RNA RESP QL NAA+PROBE: NEGATIVE

## 2021-03-01 ENCOUNTER — LAB REQUISITION (OUTPATIENT)
Dept: LAB | Facility: HOSPITAL | Age: 86
End: 2021-03-01
Payer: MEDICARE

## 2021-03-01 DIAGNOSIS — Z11.59 ENCOUNTER FOR SCREENING FOR OTHER VIRAL DISEASES: ICD-10-CM

## 2021-03-01 DIAGNOSIS — Z03.818 ENCOUNTER FOR OBSERVATION FOR SUSPECTED EXPOSURE TO OTHER BIOLOGICAL AGENTS RULED OUT: ICD-10-CM

## 2021-03-01 PROCEDURE — U0003 INFECTIOUS AGENT DETECTION BY NUCLEIC ACID (DNA OR RNA); SEVERE ACUTE RESPIRATORY SYNDROME CORONAVIRUS 2 (SARS-COV-2) (CORONAVIRUS DISEASE [COVID-19]), AMPLIFIED PROBE TECHNIQUE, MAKING USE OF HIGH THROUGHPUT TECHNOLOGIES AS DESCRIBED BY CMS-2020-01-R: HCPCS | Performed by: FAMILY MEDICINE

## 2021-03-01 PROCEDURE — U0005 INFEC AGEN DETEC AMPLI PROBE: HCPCS | Performed by: FAMILY MEDICINE

## 2021-03-02 LAB — SARS-COV-2 RNA RESP QL NAA+PROBE: NEGATIVE

## 2021-03-11 ENCOUNTER — LAB REQUISITION (OUTPATIENT)
Dept: LAB | Facility: HOSPITAL | Age: 86
End: 2021-03-11
Payer: MEDICARE

## 2021-03-11 DIAGNOSIS — Z03.818 ENCOUNTER FOR OBSERVATION FOR SUSPECTED EXPOSURE TO OTHER BIOLOGICAL AGENTS RULED OUT: ICD-10-CM

## 2021-03-11 DIAGNOSIS — Z11.59 ENCOUNTER FOR SCREENING FOR OTHER VIRAL DISEASES: ICD-10-CM

## 2021-03-11 LAB — SARS-COV-2 RNA RESP QL NAA+PROBE: NEGATIVE

## 2021-03-11 PROCEDURE — U0003 INFECTIOUS AGENT DETECTION BY NUCLEIC ACID (DNA OR RNA); SEVERE ACUTE RESPIRATORY SYNDROME CORONAVIRUS 2 (SARS-COV-2) (CORONAVIRUS DISEASE [COVID-19]), AMPLIFIED PROBE TECHNIQUE, MAKING USE OF HIGH THROUGHPUT TECHNOLOGIES AS DESCRIBED BY CMS-2020-01-R: HCPCS | Performed by: FAMILY MEDICINE

## 2021-03-11 PROCEDURE — U0005 INFEC AGEN DETEC AMPLI PROBE: HCPCS | Performed by: FAMILY MEDICINE

## 2021-03-15 ENCOUNTER — LAB REQUISITION (OUTPATIENT)
Dept: LAB | Facility: HOSPITAL | Age: 86
End: 2021-03-15
Payer: MEDICARE

## 2021-03-15 DIAGNOSIS — Z03.818 ENCOUNTER FOR OBSERVATION FOR SUSPECTED EXPOSURE TO OTHER BIOLOGICAL AGENTS RULED OUT: ICD-10-CM

## 2021-03-15 DIAGNOSIS — Z11.59 ENCOUNTER FOR SCREENING FOR OTHER VIRAL DISEASES: ICD-10-CM

## 2021-03-15 LAB — SARS-COV-2 RNA RESP QL NAA+PROBE: NEGATIVE

## 2021-03-15 PROCEDURE — U0003 INFECTIOUS AGENT DETECTION BY NUCLEIC ACID (DNA OR RNA); SEVERE ACUTE RESPIRATORY SYNDROME CORONAVIRUS 2 (SARS-COV-2) (CORONAVIRUS DISEASE [COVID-19]), AMPLIFIED PROBE TECHNIQUE, MAKING USE OF HIGH THROUGHPUT TECHNOLOGIES AS DESCRIBED BY CMS-2020-01-R: HCPCS | Performed by: FAMILY MEDICINE

## 2021-03-15 PROCEDURE — U0005 INFEC AGEN DETEC AMPLI PROBE: HCPCS | Performed by: FAMILY MEDICINE

## 2021-03-22 PROCEDURE — U0005 INFEC AGEN DETEC AMPLI PROBE: HCPCS | Performed by: FAMILY MEDICINE

## 2021-03-22 PROCEDURE — U0003 INFECTIOUS AGENT DETECTION BY NUCLEIC ACID (DNA OR RNA); SEVERE ACUTE RESPIRATORY SYNDROME CORONAVIRUS 2 (SARS-COV-2) (CORONAVIRUS DISEASE [COVID-19]), AMPLIFIED PROBE TECHNIQUE, MAKING USE OF HIGH THROUGHPUT TECHNOLOGIES AS DESCRIBED BY CMS-2020-01-R: HCPCS | Performed by: FAMILY MEDICINE

## 2021-03-23 ENCOUNTER — LAB REQUISITION (OUTPATIENT)
Dept: LAB | Facility: HOSPITAL | Age: 86
End: 2021-03-23
Payer: MEDICARE

## 2021-03-23 DIAGNOSIS — Z03.818 ENCOUNTER FOR OBSERVATION FOR SUSPECTED EXPOSURE TO OTHER BIOLOGICAL AGENTS RULED OUT: ICD-10-CM

## 2021-03-23 DIAGNOSIS — Z11.59 ENCOUNTER FOR SCREENING FOR OTHER VIRAL DISEASES: ICD-10-CM

## 2021-03-24 LAB — SARS-COV-2 RNA RESP QL NAA+PROBE: NEGATIVE

## 2021-04-09 ENCOUNTER — LAB REQUISITION (OUTPATIENT)
Dept: LAB | Facility: HOSPITAL | Age: 86
End: 2021-04-09
Payer: MEDICARE

## 2021-04-09 DIAGNOSIS — Z11.59 ENCOUNTER FOR SCREENING FOR OTHER VIRAL DISEASES: ICD-10-CM

## 2021-04-09 DIAGNOSIS — Z03.818 ENCOUNTER FOR OBSERVATION FOR SUSPECTED EXPOSURE TO OTHER BIOLOGICAL AGENTS RULED OUT: ICD-10-CM

## 2021-04-09 PROCEDURE — U0005 INFEC AGEN DETEC AMPLI PROBE: HCPCS | Performed by: FAMILY MEDICINE

## 2021-04-09 PROCEDURE — U0003 INFECTIOUS AGENT DETECTION BY NUCLEIC ACID (DNA OR RNA); SEVERE ACUTE RESPIRATORY SYNDROME CORONAVIRUS 2 (SARS-COV-2) (CORONAVIRUS DISEASE [COVID-19]), AMPLIFIED PROBE TECHNIQUE, MAKING USE OF HIGH THROUGHPUT TECHNOLOGIES AS DESCRIBED BY CMS-2020-01-R: HCPCS | Performed by: FAMILY MEDICINE

## 2021-04-10 LAB — SARS-COV-2 RNA RESP QL NAA+PROBE: NEGATIVE

## 2021-06-26 ENCOUNTER — HOSPITAL ENCOUNTER (INPATIENT)
Facility: HOSPITAL | Age: 86
LOS: 17 days | Discharge: NURSING, REHAB OR LONG-TERM CARE | DRG: 617 | End: 2021-07-13
Attending: EMERGENCY MEDICINE | Admitting: INTERNAL MEDICINE
Payer: MEDICARE

## 2021-06-26 ENCOUNTER — APPOINTMENT (EMERGENCY)
Dept: RADIOLOGY | Facility: HOSPITAL | Age: 86
DRG: 617 | End: 2021-06-26
Payer: MEDICARE

## 2021-06-26 DIAGNOSIS — E11.51 PERIPHERAL VASCULAR DISEASE IN DIABETES MELLITUS (HCC): ICD-10-CM

## 2021-06-26 DIAGNOSIS — M86.9 TOE OSTEOMYELITIS, RIGHT (HCC): ICD-10-CM

## 2021-06-26 DIAGNOSIS — F03.90 DEMENTIA WITHOUT BEHAVIORAL DISTURBANCE (HCC): ICD-10-CM

## 2021-06-26 DIAGNOSIS — E78.5 HYPERLIPIDEMIA: ICD-10-CM

## 2021-06-26 DIAGNOSIS — L97.509 FOOT ULCER (HCC): Primary | ICD-10-CM

## 2021-06-26 DIAGNOSIS — Z09 FOLLOW UP: ICD-10-CM

## 2021-06-26 DIAGNOSIS — Z86.79 HISTORY OF CORONARY ARTERY DISEASE: ICD-10-CM

## 2021-06-26 DIAGNOSIS — L97.514 SKIN ULCER OF SECOND TOE OF RIGHT FOOT WITH NECROSIS OF BONE (HCC): ICD-10-CM

## 2021-06-26 PROBLEM — H91.93 HEARING DIFFICULTY OF BOTH EARS: Status: ACTIVE | Noted: 2021-06-26

## 2021-06-26 PROBLEM — L97.519 RIGHT SECOND TOE ULCER (HCC): Status: ACTIVE | Noted: 2021-06-26

## 2021-06-26 PROBLEM — L57.8 ACTINIC DERMATITIS: Status: ACTIVE | Noted: 2021-06-26

## 2021-06-26 PROBLEM — I50.30 DIASTOLIC HEART FAILURE (HCC): Status: ACTIVE | Noted: 2021-06-26

## 2021-06-26 PROBLEM — N40.0 BPH (BENIGN PROSTATIC HYPERPLASIA): Status: ACTIVE | Noted: 2021-06-26

## 2021-06-26 PROBLEM — Z79.4 TYPE 2 DIABETES MELLITUS, WITH LONG-TERM CURRENT USE OF INSULIN (HCC): Status: ACTIVE | Noted: 2021-06-26

## 2021-06-26 PROBLEM — I10 HYPERTENSION: Status: ACTIVE | Noted: 2021-06-26

## 2021-06-26 PROBLEM — E11.9 TYPE 2 DIABETES MELLITUS, WITH LONG-TERM CURRENT USE OF INSULIN (HCC): Status: ACTIVE | Noted: 2021-06-26

## 2021-06-26 PROBLEM — D64.9 ANEMIA: Status: ACTIVE | Noted: 2021-06-26

## 2021-06-26 LAB
ANION GAP SERPL CALCULATED.3IONS-SCNC: 4 MMOL/L (ref 4–13)
BASOPHILS # BLD AUTO: 0.05 THOUSANDS/ΜL (ref 0–0.1)
BASOPHILS NFR BLD AUTO: 0 % (ref 0–1)
BUN SERPL-MCNC: 13 MG/DL (ref 5–25)
CALCIUM SERPL-MCNC: 9.5 MG/DL (ref 8.3–10.1)
CHLORIDE SERPL-SCNC: 102 MMOL/L (ref 100–108)
CHOLEST SERPL-MCNC: 127 MG/DL (ref 50–200)
CO2 SERPL-SCNC: 30 MMOL/L (ref 21–32)
CREAT SERPL-MCNC: 1.06 MG/DL (ref 0.6–1.3)
CRP SERPL QL: 18.4 MG/L
EOSINOPHIL # BLD AUTO: 0.62 THOUSAND/ΜL (ref 0–0.61)
EOSINOPHIL NFR BLD AUTO: 5 % (ref 0–6)
ERYTHROCYTE [DISTWIDTH] IN BLOOD BY AUTOMATED COUNT: 12.1 % (ref 11.6–15.1)
ERYTHROCYTE [SEDIMENTATION RATE] IN BLOOD: 59 MM/HOUR (ref 0–19)
EST. AVERAGE GLUCOSE BLD GHB EST-MCNC: 151 MG/DL
GFR SERPL CREATININE-BSD FRML MDRD: 61 ML/MIN/1.73SQ M
GLUCOSE SERPL-MCNC: 144 MG/DL (ref 65–140)
GLUCOSE SERPL-MCNC: 151 MG/DL (ref 65–140)
HBA1C MFR BLD: 6.9 %
HCT VFR BLD AUTO: 35.2 % (ref 36.5–49.3)
HDLC SERPL-MCNC: 54 MG/DL
HGB BLD-MCNC: 11.4 G/DL (ref 12–17)
IMM GRANULOCYTES # BLD AUTO: 0.07 THOUSAND/UL (ref 0–0.2)
IMM GRANULOCYTES NFR BLD AUTO: 1 % (ref 0–2)
LACTATE SERPL-SCNC: 1.6 MMOL/L (ref 0.5–2)
LDLC SERPL CALC-MCNC: 55 MG/DL (ref 0–100)
LYMPHOCYTES # BLD AUTO: 1.98 THOUSANDS/ΜL (ref 0.6–4.47)
LYMPHOCYTES NFR BLD AUTO: 17 % (ref 14–44)
MCH RBC QN AUTO: 30.7 PG (ref 26.8–34.3)
MCHC RBC AUTO-ENTMCNC: 32.4 G/DL (ref 31.4–37.4)
MCV RBC AUTO: 95 FL (ref 82–98)
MONOCYTES # BLD AUTO: 0.58 THOUSAND/ΜL (ref 0.17–1.22)
MONOCYTES NFR BLD AUTO: 5 % (ref 4–12)
NEUTROPHILS # BLD AUTO: 8.24 THOUSANDS/ΜL (ref 1.85–7.62)
NEUTS SEG NFR BLD AUTO: 72 % (ref 43–75)
NONHDLC SERPL-MCNC: 73 MG/DL
NRBC BLD AUTO-RTO: 0 /100 WBCS
PLATELET # BLD AUTO: 349 THOUSANDS/UL (ref 149–390)
PMV BLD AUTO: 9.5 FL (ref 8.9–12.7)
POTASSIUM SERPL-SCNC: 4.1 MMOL/L (ref 3.5–5.3)
RBC # BLD AUTO: 3.71 MILLION/UL (ref 3.88–5.62)
SODIUM SERPL-SCNC: 136 MMOL/L (ref 136–145)
TRIGL SERPL-MCNC: 88 MG/DL
WBC # BLD AUTO: 11.54 THOUSAND/UL (ref 4.31–10.16)

## 2021-06-26 PROCEDURE — 96375 TX/PRO/DX INJ NEW DRUG ADDON: CPT

## 2021-06-26 PROCEDURE — 99285 EMERGENCY DEPT VISIT HI MDM: CPT | Performed by: EMERGENCY MEDICINE

## 2021-06-26 PROCEDURE — 99204 OFFICE O/P NEW MOD 45 MIN: CPT | Performed by: PODIATRIST

## 2021-06-26 PROCEDURE — 86140 C-REACTIVE PROTEIN: CPT | Performed by: STUDENT IN AN ORGANIZED HEALTH CARE EDUCATION/TRAINING PROGRAM

## 2021-06-26 PROCEDURE — 80061 LIPID PANEL: CPT | Performed by: STUDENT IN AN ORGANIZED HEALTH CARE EDUCATION/TRAINING PROGRAM

## 2021-06-26 PROCEDURE — 73630 X-RAY EXAM OF FOOT: CPT

## 2021-06-26 PROCEDURE — 36415 COLL VENOUS BLD VENIPUNCTURE: CPT | Performed by: STUDENT IN AN ORGANIZED HEALTH CARE EDUCATION/TRAINING PROGRAM

## 2021-06-26 PROCEDURE — 83605 ASSAY OF LACTIC ACID: CPT | Performed by: STUDENT IN AN ORGANIZED HEALTH CARE EDUCATION/TRAINING PROGRAM

## 2021-06-26 PROCEDURE — 85025 COMPLETE CBC W/AUTO DIFF WBC: CPT | Performed by: STUDENT IN AN ORGANIZED HEALTH CARE EDUCATION/TRAINING PROGRAM

## 2021-06-26 PROCEDURE — 80048 BASIC METABOLIC PNL TOTAL CA: CPT | Performed by: STUDENT IN AN ORGANIZED HEALTH CARE EDUCATION/TRAINING PROGRAM

## 2021-06-26 PROCEDURE — 83036 HEMOGLOBIN GLYCOSYLATED A1C: CPT | Performed by: STUDENT IN AN ORGANIZED HEALTH CARE EDUCATION/TRAINING PROGRAM

## 2021-06-26 PROCEDURE — 96374 THER/PROPH/DIAG INJ IV PUSH: CPT

## 2021-06-26 PROCEDURE — 99285 EMERGENCY DEPT VISIT HI MDM: CPT

## 2021-06-26 PROCEDURE — 82948 REAGENT STRIP/BLOOD GLUCOSE: CPT

## 2021-06-26 PROCEDURE — 85652 RBC SED RATE AUTOMATED: CPT | Performed by: STUDENT IN AN ORGANIZED HEALTH CARE EDUCATION/TRAINING PROGRAM

## 2021-06-26 RX ORDER — PANTOPRAZOLE SODIUM 40 MG/1
40 TABLET, DELAYED RELEASE ORAL 2 TIMES DAILY
Status: DISCONTINUED | OUTPATIENT
Start: 2021-06-26 | End: 2021-07-13 | Stop reason: HOSPADM

## 2021-06-26 RX ORDER — POLYETHYLENE GLYCOL 3350 17 G/17G
17 POWDER, FOR SOLUTION ORAL DAILY PRN
Status: DISCONTINUED | OUTPATIENT
Start: 2021-06-26 | End: 2021-06-28

## 2021-06-26 RX ORDER — FLUTICASONE PROPIONATE 50 MCG
1 SPRAY, SUSPENSION (ML) NASAL DAILY
Status: DISCONTINUED | OUTPATIENT
Start: 2021-06-27 | End: 2021-07-13 | Stop reason: HOSPADM

## 2021-06-26 RX ORDER — TAMSULOSIN HYDROCHLORIDE 0.4 MG/1
0.4 CAPSULE ORAL
Status: DISCONTINUED | OUTPATIENT
Start: 2021-06-27 | End: 2021-07-13 | Stop reason: HOSPADM

## 2021-06-26 RX ORDER — ACETAMINOPHEN 325 MG/1
500 TABLET ORAL EVERY 6 HOURS PRN
Status: DISCONTINUED | OUTPATIENT
Start: 2021-06-26 | End: 2021-07-13 | Stop reason: HOSPADM

## 2021-06-26 RX ORDER — INSULIN GLARGINE 100 [IU]/ML
20 INJECTION, SOLUTION SUBCUTANEOUS
Status: DISCONTINUED | OUTPATIENT
Start: 2021-06-26 | End: 2021-06-26

## 2021-06-26 RX ORDER — CEFAZOLIN SODIUM 2 G/50ML
2000 SOLUTION INTRAVENOUS ONCE
Status: COMPLETED | OUTPATIENT
Start: 2021-06-26 | End: 2021-06-26

## 2021-06-26 RX ORDER — INSULIN GLARGINE 100 [IU]/ML
15 INJECTION, SOLUTION SUBCUTANEOUS
Status: DISCONTINUED | OUTPATIENT
Start: 2021-06-26 | End: 2021-06-26

## 2021-06-26 RX ORDER — CLOTRIMAZOLE 1 %
CREAM (GRAM) TOPICAL 2 TIMES DAILY
Status: DISCONTINUED | OUTPATIENT
Start: 2021-06-27 | End: 2021-07-13 | Stop reason: HOSPADM

## 2021-06-26 RX ORDER — CEFAZOLIN SODIUM 2 G/50ML
2000 SOLUTION INTRAVENOUS EVERY 8 HOURS
Status: DISCONTINUED | OUTPATIENT
Start: 2021-06-27 | End: 2021-06-30

## 2021-06-26 RX ORDER — MELATONIN
1000 DAILY
Status: DISCONTINUED | OUTPATIENT
Start: 2021-06-27 | End: 2021-07-13 | Stop reason: HOSPADM

## 2021-06-26 RX ORDER — DOCUSATE SODIUM 100 MG/1
100 CAPSULE, LIQUID FILLED ORAL 2 TIMES DAILY
Status: DISCONTINUED | OUTPATIENT
Start: 2021-06-26 | End: 2021-06-28

## 2021-06-26 RX ORDER — INSULIN GLARGINE 100 [IU]/ML
12 INJECTION, SOLUTION SUBCUTANEOUS DAILY
Status: DISCONTINUED | OUTPATIENT
Start: 2021-06-27 | End: 2021-06-29

## 2021-06-26 RX ORDER — BUMETANIDE 0.5 MG/1
0.5 TABLET ORAL DAILY
Status: DISCONTINUED | OUTPATIENT
Start: 2021-06-27 | End: 2021-07-13 | Stop reason: HOSPADM

## 2021-06-26 RX ORDER — CLOPIDOGREL BISULFATE 75 MG/1
75 TABLET ORAL DAILY
Status: DISCONTINUED | OUTPATIENT
Start: 2021-06-27 | End: 2021-07-13 | Stop reason: HOSPADM

## 2021-06-26 RX ORDER — ASPIRIN 81 MG/1
81 TABLET ORAL DAILY
Status: DISCONTINUED | OUTPATIENT
Start: 2021-06-27 | End: 2021-07-13 | Stop reason: HOSPADM

## 2021-06-26 RX ORDER — ATORVASTATIN CALCIUM 20 MG/1
10 TABLET, FILM COATED ORAL
Status: DISCONTINUED | OUTPATIENT
Start: 2021-06-27 | End: 2021-07-07

## 2021-06-26 RX ORDER — FERROUS SULFATE 325(65) MG
325 TABLET ORAL
Status: DISCONTINUED | OUTPATIENT
Start: 2021-06-27 | End: 2021-07-13 | Stop reason: HOSPADM

## 2021-06-26 RX ADMIN — METRONIDAZOLE 500 MG: 500 INJECTION, SOLUTION INTRAVENOUS at 20:17

## 2021-06-26 RX ADMIN — CEFAZOLIN SODIUM 2000 MG: 2 SOLUTION INTRAVENOUS at 20:08

## 2021-06-26 RX ADMIN — DOCUSATE SODIUM 100 MG: 100 CAPSULE ORAL at 23:24

## 2021-06-26 RX ADMIN — METOPROLOL TARTRATE 25 MG: 25 TABLET, FILM COATED ORAL at 23:24

## 2021-06-26 NOTE — ED PROVIDER NOTES
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 Taking?    INSULIN SYRINGE  5CC/28G 28G X 1/2" 0 5 ML MISC   Yes No   Sig: Monoject Insulin Safety Syringe 0 5 mL 29 gauge x 1/2"   Menthol-Zinc Oxide (CALMOSEPTINE EX)   Yes No   Sig: Apply 1 application topically   Menthol-Zinc Oxide (RISAMINE EX)   Yes No   Sig: Apply topically   Mirabegron ER (MYRBETRIQ) 50 MG TB24   Yes No   Sig: Take by mouth   NON FORMULARY   Yes No   Si application as needed   acetaminophen (TYLENOL) 500 mg tablet   Yes No   Sig: Take 500 mg by mouth every 6 (six) hours as needed for mild pain   aspirin (ECOTRIN LOW STRENGTH) 81 mg EC tablet   Yes No   Sig: Take 81 mg by mouth daily   bumetanide (BUMEX) 0 5 MG tablet   Yes No   Sig: Take 0 5 mg by mouth daily   cholecalciferol (VITAMIN D3) 1,000 units tablet   Yes No   Sig: Take 1,000 Units by mouth daily   clopidogrel (PLAVIX) 75 mg tablet   Yes No   Sig: Take 75 mg by mouth daily   ferrous sulfate 325 (65 Fe) mg tablet   Yes No   Sig: Take 325 mg by mouth daily with breakfast   fluticasone (FLONASE) 50 mcg/act nasal spray   Yes No   Sig: fluticasone propionate 50 mcg/actuation nasal spray,suspension   glucose 40 %   Yes No   Sig: Take 15 g by mouth once   glucose blood test strip   Yes No   Sig: test blood sugar & accucheck fasting at 7am and 2 hours after   insulin glargine (LANTUS) 100 units/mL subcutaneous injection   Yes No   Sig: Inject under the skin daily at bedtime   ketoconazole (NIZORAL) 2 % cream   Yes No   Sig: Apply topically daily   metFORMIN (GLUCOPHAGE) 500 mg tablet   Yes No   Sig: Take 500 mg by mouth daily with breakfast   nystatin-triamcinolone (MYCOLOG-II) ointment   Yes No   Sig: Apply 1 application topically 2 (two) times a day   pantoprazole (PROTONIX) 40 mg tablet   Yes No   Sig: Take 40 mg by mouth 2 (two) times a day   polyethylene glycol (GLYCOLAX) powder   No No   Sig: Take 17 g by mouth daily   tamsulosin (FLOMAX) 0 4 mg   Yes No   Si 4 mg      Facility-Administered Medications: None       Past Medical History:   Diagnosis Date    Anemia     Dementia ([facility] [address] )     Diabetes mellitus ([facility] )     Diastolic heart failure (HCC)     Hyperlipidemia     Hypertension     Osteoarthritis     PVD (peripheral vascular disease) ([facility])        Past Surgical History:   Procedure Laterality Date    AMPUTATION Left     left bka       History reviewed  No pertinent family history  I have reviewed and agree with the history as documented      E-Cigarette/Vaping    E-Cigarette Use Never User      E-Cigarette/Vaping Substances     Social History     Tobacco Use    Smoking status: Never Smoker    Smokeless tobacco: Never Used   Vaping Use    Vaping Use: Never used   Substance Use Topics    Alcohol use: No    Drug use: Never        Review of Systems   Unable to perform ROS: Dementia       Physical Exam  ED Triage Vitals   Temperature Pulse Respirations Blood Pressure SpO2   21 1645 21 1645 21 1645 21 1645 21 1645   97 9 °F (36 6 °C) 88 16 130/68 99 %      Temp Source Heart Rate Source Patient Position - Orthostatic VS BP Location FiO2 (%)   21 16421 --   Oral Monitor Lying Left arm       Pain Score       21       No Pain             Orthostatic Vital Signs  Vitals:    21 1645 21 2223   BP: 130/68 130/66 134/66   Pulse: 88 97 105   Patient Position - Orthostatic VS:  Lying Lying           Physical Exam  Vitals and nursing note reviewed  Constitutional:       Appearance: He is well-developed  HENT:      Head: Normocephalic and atraumatic  Eyes:      Conjunctiva/sclera: Conjunctivae normal    Cardiovascular:      Rate and Rhythm: Normal rate and regular rhythm  Heart sounds: No murmur heard  Pulmonary:      Effort: Pulmonary effort is normal  No respiratory distress  Breath sounds: Normal breath sounds  Abdominal:      Palpations: Abdomen is soft  Tenderness: There is no abdominal tenderness  Musculoskeletal:      Cervical back: Neck supple  Comments: Left BKA   Skin:     General: Skin is warm and dry  Comments: Ulceration of right 2nd toe   Neurological:      Mental Status: He is alert  Psychiatric:         Cognition and Memory: Cognition is impaired  Memory is impaired           ED Medications  Medications   acetaminophen (TYLENOL) tablet 488 mg (has no administration in time range)   aspirin (ECOTRIN LOW STRENGTH) EC tablet 81 mg (has no administration in time range)   bumetanide (BUMEX) tablet 0 5 mg (has no administration in time range)   ferrous sulfate tablet 325 mg (has no administration in time range)   tamsulosin (FLOMAX) capsule 0 4 mg (has no administration in time range)   clopidogrel (PLAVIX) tablet 75 mg (has no administration in time range)   fluticasone (FLONASE) 50 mcg/act nasal spray 1 spray (has no administration in time range)   pantoprazole (PROTONIX) EC tablet 40 mg (40 mg Oral Refused 6/27/21 0017)   cholecalciferol (VITAMIN D3) tablet 1,000 Units (has no administration in time range)   atorvastatin (LIPITOR) tablet 10 mg (has no administration in time range)   docusate sodium (COLACE) capsule 100 mg (100 mg Oral Given 6/26/21 2324)   metoprolol tartrate (LOPRESSOR) tablet 25 mg (25 mg Oral Given 6/26/21 2324)   enoxaparin (LOVENOX) subcutaneous injection 40 mg (has no administration in time range)   insulin glargine (LANTUS) subcutaneous injection 12 Units 0 12 mL (has no administration in time range)   polyethylene glycol (MIRALAX) packet 17 g (has no administration in time range)   metroNIDAZOLE (FLAGYL) IVPB (premix) 500 mg 100 mL (has no administration in time range)   ceFAZolin (ANCEF) IVPB (premix in dextrose) 2,000 mg 50 mL (has no administration in time range)   clotrimazole (LOTRIMIN) 1 % cream (has no administration in time range)   ceFAZolin (ANCEF) IVPB (premix in dextrose) 2,000 mg 50 mL (0 mg Intravenous Stopped 6/26/21 2013)   metroNIDAZOLE (FLAGYL) IVPB (premix) 500 mg 100 mL (0 mg Intravenous Stopped 6/26/21 2121)       Diagnostic Studies  Results Reviewed     Procedure Component Value Units Date/Time    Lipid panel [[id number]] Collected: 06/26/21 1916    Lab Status: Final result Specimen: Blood from Arm, Left Updated: 06/26/21 2223     Cholesterol 127 mg/dL      Triglycerides 88 mg/dL      HDL, Direct 54 mg/dL      LDL Calculated 55 mg/dL      Non-HDL-Chol (CHOL-HDL) 73 mg/dl     Hemoglobin A1C w/ EAG Estimation [[id number]]  (Abnormal) Collected: 06/26/21 1917    Lab Status: Final result Specimen: Blood from Arm, Left Updated: 06/26/21 2112     Hemoglobin A1C 6 9 %       mg/dl     Platelet count [[id number]]     Lab Status: No result Specimen: Blood     Lactic acid, plasma [[id number]]  (Normal) Collected: 06/26/21 1917    Lab Status: Final result Specimen: Blood from Arm, Left Updated: 06/26/21 1942     LACTIC ACID 1 6 mmol/L     Narrative:      Result may be elevated if tourniquet was used during collection      Basic metabolic panel [[id number]]  (Abnormal) Collected: 06/26/21 1916    Lab Status: Final result Specimen: Blood from Arm, Left Updated: 06/26/21 1936     Sodium 136 mmol/L      Potassium 4 1 mmol/L      Chloride 102 mmol/L      CO2 30 mmol/L      ANION GAP 4 mmol/L      BUN 13 mg/dL      Creatinine 1 06 mg/dL      Glucose 144 mg/dL      Calcium 9 5 mg/dL      eGFR 61 ml/min/1 73sq m     Narrative:      [place] guidelines for Chronic Kidney Disease (CKD):     Stage 1 with normal or high GFR (GFR > 90 mL/min/1 73 square meters)    Stage 2 Mild CKD (GFR = 60-89 mL/min/1 73 square meters)    Stage 3A Moderate CKD (GFR = 45-59 mL/min/1 73 square meters)    Stage 3B Moderate CKD (GFR = 30-44 mL/min/1 73 square meters)    Stage 4 Severe CKD (GFR = 15-29 mL/min/1 73 square meters)    Stage 5 End Stage CKD (GFR <15 mL/min/1 73 square meters)  Note: GFR calculation is accurate only with a steady state creatinine    C-reactive protein [[id number]]  (Abnormal) Collected: 06/26/21 1916    Lab Status: Final result Specimen: Blood from Arm, Left Updated: 06/26/21 1936     CRP 18 4 mg/L     Sedimentation rate, automated [[id number]]  (Abnormal) Collected: 06/26/21 1916    Lab Status: Final result Specimen: Blood from Arm, Left Updated: 06/26/21 1925     Sed Rate 59 mm/hour     CBC and differential [[id number]]  (Abnormal) Collected: 06/26/21 1917    Lab Status: Final result Specimen: Blood from Arm, Left Updated: 06/26/21 1925     WBC 11 54 Thousand/uL      RBC 3 71 Million/uL      Hemoglobin 11 4 g/dL      Hematocrit 35 2 %      MCV 95 fL      MCH 30 7 pg      MCHC 32 4 g/dL      RDW 12 1 %      MPV 9 5 fL      Platelets 225 Thousands/uL      nRBC 0 /100 WBCs      Neutrophils Relative 72 %      Immat GRANS % 1 %      Lymphocytes Relative 17 %      Monocytes Relative 5 %      Eosinophils Relative 5 %      Basophils Relative 0 %      Neutrophils Absolute 8 24 Thousands/µL      Immature Grans Absolute 0 07 Thousand/uL      Lymphocytes Absolute 1 98 Thousands/µL      Monocytes Absolute 0 58 Thousand/µL      Eosinophils Absolute 0 62 Thousand/µL      Basophils Absolute 0 05 Thousands/µL                  XR foot 3+ views RIGHT    (Results Pending)   VAS lower limb arterial duplex, limited, unilateral (Results Pending)         Procedures  Procedures      ED Course               Identification of Seniors at Risk      Most Recent Value   (ISAR) Identification of Seniors at Risk   Before the illness or injury that brought you to the Emergency, did you need someone to help you on a regular basis? 0 Filed at: 06/26/2021 1645   In the last 24 hours, have you needed more help than usual?  0 Filed at: 06/26/2021 1645   Have you been hospitalized for one or more nights during the past 6 months? 0 Filed at: 06/26/2021 1645   In general, do you see well?  0 Filed at: 06/26/2021 1645   In general, do you have serious problems with your memory? 0 Filed at: 06/26/2021 1645   Do you take more than three different medications every day? 1 Filed at: 06/26/2021 1645   ISAR Score  1 Filed at: 06/26/2021 1645                              MDM  Number of Diagnoses or Management Options  Foot ulcer ([facility] [address] )  Diagnosis management comments: Podiatry was contacted x-ray of foot was ordered  Concern for osteomyelitis  Started on IV antibiotics and admitted to Medicine  Disposition  Final diagnoses: Foot ulcer ([facility] [address] )     Time reflects when diagnosis was documented in both MDM as applicable and the Disposition within this note     Time User Action Codes Description Comment    6/26/2021  6:14 PM Laurent Veras Add [O28 299] Foot ulcer [facility])       ED Disposition     ED Disposition Condition Date/Time Comment    Admit Stable Sat Jun 26, 2021  8:21 PM Case was discussed with SOD and the patient's admission status was agreed to be Admission Status: inpatient status to the service of Dr Magalis Rodas           Follow-up Information    None         Current Discharge Medication List      CONTINUE these medications which have NOT CHANGED    Details   acetaminophen (TYLENOL) 500 mg tablet Take 500 mg by mouth every 6 (six) hours as needed for mild pain      aspirin (ECOTRIN LOW STRENGTH) 81 mg EC tablet Take 81 mg by mouth daily      bumetanide (BUMEX) 0 5 MG tablet Take 0 5 mg by mouth daily      cholecalciferol (VITAMIN D3) 1,000 units tablet Take 1,000 Units by mouth daily      clopidogrel (PLAVIX) 75 mg tablet Take 75 mg by mouth daily      ferrous sulfate 325 (65 Fe) mg tablet Take 325 mg by mouth daily with breakfast      fluticasone (FLONASE) 50 mcg/act nasal spray fluticasone propionate 50 mcg/actuation nasal spray,suspension      glucose 40 % Take 15 g by mouth once      glucose blood test strip test blood sugar & accucheck fasting at 7am and 2 hours after      insulin glargine (LANTUS) 100 units/mL subcutaneous injection Inject under the skin daily at bedtime      INSULIN SYRINGE  5CC/28G 28G X 1/2" 0 5 ML MISC Monoject Insulin Safety Syringe 0 5 mL 29 gauge x 1/2"      ketoconazole (NIZORAL) 2 % cream Apply topically daily      Menthol-Zinc Oxide (CALMOSEPTINE EX) Apply 1 application topically      Menthol-Zinc Oxide (RISAMINE EX) Apply topically      metFORMIN (GLUCOPHAGE) 500 mg tablet Take 500 mg by mouth daily with breakfast      Mirabegron ER (MYRBETRIQ) 50 MG TB24 Take by mouth      NON FORMULARY 1 application as needed      nystatin-triamcinolone (MYCOLOG-II) ointment Apply 1 application topically 2 (two) times a day      pantoprazole (PROTONIX) 40 mg tablet Take 40 mg by mouth 2 (two) times a day      polyethylene glycol (GLYCOLAX) powder Take 17 g by mouth daily  Qty: 255 g, Refills: 0    Associated Diagnoses: Constipation      tamsulosin (FLOMAX) 0 4 mg 0 4 mg           No discharge procedures on file  PDMP Review     None           ED Provider  Attending physically available and evaluated Kiersten Troyzelalem ARRIAGA managed the patient along with the ED Attending      Electronically Signed by         Lee Almodovar DO  06/27/21 8843

## 2021-06-26 NOTE — ED ATTENDING ATTESTATION
6/26/2021  I saw and evaluated the patient  I have discussed the patient with the resident physician and agree with the resident's findings, assessment and plan as documented in the resident physician's note, unless otherwise documented below  All available laboratory and imaging studies were reviewed by myself  I was present for key portions of any procedure(s) performed by the resident and I was immediately available to provide assistance  I agree with the current assessment done in the Emergency Department  I have conducted an independent evaluation of this patient  Maynor Olivier is a 80 y o  male With past medical history of dementia, diabetes mellitus, diastolic heart failure, hypertension, hyperlipidemia, peripheral vascular disease, prior left below-knee amputation, chronic indwelling Hays catheter, presenting with right 2nd toe wound  Patient resides at a nursing home  It is unclear when his right 2nd toe developed a wound, as patient is unable to provide history of present illness and was not aware that there is a wound to his toe  Patient reports that he is unable to feel much below his right ankle  He has no pain  He denies fevers  Denies chest pain  Denies shortness of breath  At present, patient has no complaints and no concerns  Review of Systems:  Unable to obtain due to underlying dementia, patient has no complaints when going by systems, he has to be reminded he has a left leg amputation    Physical Exam  Vitals:    06/26/21 1645   BP: 130/68   TempSrc: Oral   Pulse: 88   Resp: 16   Temp: 97 9 °F (36 6 °C)     SPO2 RA Rest      ED from 6/26/2021 in [address] [place] [address] Emergency Department   SpO2  99 %   SpO2 Activity  At Rest   O2 Device  None (Room air)   O2 Flow Rate  --        Constitutional:  Awake, alert, oriented to self only  Nontoxic appearing and in No acute distress  HEENT:  Normocephalic, atraumatic  Sclera anicteric, conjunctiva not injected    Moist oral mucosa  Cardiac:  Regular rate and rhythm, no murmurs, rubs, or gallops  2+ radial pulses  Respiratory:  Lungs are clear to auscultation bilaterally, no wheezes or rales  Abdomen:  Nondistended  Bowel sounds present  No tenderness to palpation  No rebound or guarding  Chronic indwelling Hays catheter is in place  Patient turned on his side, there are no sacral decubitus ulcers, however, there is a small, dime-sized mid back ulcer that is dressed with a clean dressing, wound is non erythematous with a clean appearing granulation base  Extremities:  Left lower extremity is with below-knee amputation  Right lower extremity is with a large, nickel sized wound to the dorsum of 2nd toe overlying the proximal interphalangeal joint, toe is erythematous, there are no red streaks traveling up from the wound and there is no crepitus or purple discoloration of the dorsum of the foot  There is a concern that bone is visible at the center of the wound  Right lower extremity is quite edematous limiting pulse assessment, however, Doppler signals are present over dorsalis pedis and posterior tibial pulses on the right  Integument:  No rashes over exposed areas, cap refill less than 2 seconds  Neurologic:  Awake, alert, and oriented to self only  Face is symmetric  5/5 strength in bilateral upper extremities  Patient is able to fire quadriceps and hamstrings bilaterally  He is able to fire tibialis anterior, gastrocsoleus, EHL, and FHL on right lower extremity  Sensation to light touch is decreased mid leg and distally on the right  Psychiatric:  Very pleasant, confused     Media Information     Document Information    Clinical Image - Mobile Device      06/26/2021 16:58   Attached To:    Hospital Encounter on 6/26/21   Source Information    Sophia Gabbs, DO  [facility]         Diagnostic Studies   Basic metabolic panel [[id number]]  (Abnormal) Collected: 06/26/21 1916    Lab Status: Final result Specimen: Blood from Arm, Left Updated: 06/26/21 1936     Sodium 136 mmol/L      Potassium 4 1 mmol/L      Chloride 102 mmol/L      CO2 30 mmol/L      ANION GAP 4 mmol/L      BUN 13 mg/dL      Creatinine 1 06 mg/dL      Glucose 144 mg/dL      Calcium 9 5 mg/dL      eGFR 61 ml/min/1 73sq m     Narrative:      [place] guidelines for Chronic Kidney Disease (CKD):     Stage 1 with normal or high GFR (GFR > 90 mL/min/1 73 square meters)    Stage 2 Mild CKD (GFR = 60-89 mL/min/1 73 square meters)    Stage 3A Moderate CKD (GFR = 45-59 mL/min/1 73 square meters)    Stage 3B Moderate CKD (GFR = 30-44 mL/min/1 73 square meters)    Stage 4 Severe CKD (GFR = 15-29 mL/min/1 73 square meters)    Stage 5 End Stage CKD (GFR <15 mL/min/1 73 square meters)  Note: GFR calculation is accurate only with a steady state creatinine    C-reactive protein [[id number]]  (Abnormal) Collected: 06/26/21 1916    Lab Status: Final result Specimen: Blood from Arm, Left Updated: 06/26/21 1936     CRP 18 4 mg/L     Sedimentation rate, automated [[id number]]  (Abnormal) Collected: 06/26/21 1916    Lab Status: Final result Specimen: Blood from Arm, Left Updated: 06/26/21 1925     Sed Rate 59 mm/hour     CBC and differential [[id number]]  (Abnormal) Collected: 06/26/21 1917    Lab Status: Final result Specimen: Blood from Arm, Left Updated: 06/26/21 1925     WBC 11 54 Thousand/uL      RBC 3 71 Million/uL      Hemoglobin 11 4 g/dL      Hematocrit 35 2 %      MCV 95 fL      MCH 30 7 pg      MCHC 32 4 g/dL      RDW 12 1 %      MPV 9 5 fL      Platelets 828 Thousands/uL      nRBC 0 /100 WBCs      Neutrophils Relative 72 %      Immat GRANS % 1 %      Lymphocytes Relative 17 %      Monocytes Relative 5 %      Eosinophils Relative 5 %      Basophils Relative 0 %      Neutrophils Absolute 8 24 Thousands/µL      Immature Grans Absolute 0 07 Thousand/uL      Lymphocytes Absolute 1 98 Thousands/µL      Monocytes Absolute 0 58 Thousand/µL Eosinophils Absolute 0 62 Thousand/µL      Basophils Absolute 0 05 Thousands/µL           VAS lower limb arterial duplex, limited, unilateral   Final Result      XR foot 3+ views RIGHT   Final Result      2nd mid phalanx, 2nd distal phalanx osteomyelitis suspected  The study was marked in [facility] for immediate notification  Workstation performed: [id number]             Procedures  Procedures            ED Course  Medications   ceFAZolin (ANCEF) IVPB (premix in dextrose) 2,000 mg 50 mL (0 mg Intravenous Stopped 6/26/21 2013)   metroNIDAZOLE (FLAGYL) IVPB (premix) 500 mg 100 mL (0 mg Intravenous Stopped 6/26/21 241)     68-year-old male presenting with right 2nd toe wound in setting of peripheral vascular disease and diabetes  Vital Signs reviewed, afebrile, not tachycardic, not hypotensive, not hypoxic  Patient denies any pain, likely since he is insensate below mid leg  Differential diagnosis includes diabetic wound/ulcer, ischemic ulcer due to peripheral vascular disease, with etiologies such as wet gangrene, necrotizing soft tissue infection considered but deemed less likely given physical exam and vital signs  Podiatry consulted, consultant is evaluating patient at bedside  BMP reveals baseline renal function, glucose of 144, lactate of 1 6, CBC with mild leukocytosis of 11 54, hemoglobin of 11 4, no thrombocytopenia  ESR is elevated at 59 and CRP at 18  4  X-ray of right foot to my review is concerning for cortical destruction at the 2nd toe  Podiatry consultant recommends Ancef and Flagyl and medical admission  Clinical Impression  Final diagnoses:    Foot ulcer ([address] )   Toe osteomyelitis, right ([address] )

## 2021-06-27 ENCOUNTER — APPOINTMENT (INPATIENT)
Dept: NON INVASIVE DIAGNOSTICS | Facility: HOSPITAL | Age: 86
DRG: 617 | End: 2021-06-27
Payer: MEDICARE

## 2021-06-27 LAB
GLUCOSE SERPL-MCNC: 134 MG/DL (ref 65–140)
GLUCOSE SERPL-MCNC: 146 MG/DL (ref 65–140)
GLUCOSE SERPL-MCNC: 153 MG/DL (ref 65–140)
GLUCOSE SERPL-MCNC: 165 MG/DL (ref 65–140)

## 2021-06-27 PROCEDURE — 82948 REAGENT STRIP/BLOOD GLUCOSE: CPT

## 2021-06-27 PROCEDURE — 93926 LOWER EXTREMITY STUDY: CPT

## 2021-06-27 RX ADMIN — Medication 1000 UNITS: at 08:18

## 2021-06-27 RX ADMIN — METOPROLOL TARTRATE 25 MG: 25 TABLET, FILM COATED ORAL at 20:53

## 2021-06-27 RX ADMIN — PANTOPRAZOLE SODIUM 40 MG: 40 TABLET, DELAYED RELEASE ORAL at 18:10

## 2021-06-27 RX ADMIN — ATORVASTATIN CALCIUM 10 MG: 20 TABLET, FILM COATED ORAL at 18:09

## 2021-06-27 RX ADMIN — ENOXAPARIN SODIUM 40 MG: 40 INJECTION SUBCUTANEOUS at 08:18

## 2021-06-27 RX ADMIN — TAMSULOSIN HYDROCHLORIDE 0.4 MG: 0.4 CAPSULE ORAL at 18:09

## 2021-06-27 RX ADMIN — FERROUS SULFATE TAB 325 MG (65 MG ELEMENTAL FE) 325 MG: 325 (65 FE) TAB at 08:17

## 2021-06-27 RX ADMIN — PANTOPRAZOLE SODIUM 40 MG: 40 TABLET, DELAYED RELEASE ORAL at 08:18

## 2021-06-27 RX ADMIN — CEFAZOLIN SODIUM 2000 MG: 2 SOLUTION INTRAVENOUS at 04:04

## 2021-06-27 RX ADMIN — METRONIDAZOLE 500 MG: 500 INJECTION, SOLUTION INTRAVENOUS at 20:53

## 2021-06-27 RX ADMIN — CLOPIDOGREL BISULFATE 75 MG: 75 TABLET ORAL at 08:18

## 2021-06-27 RX ADMIN — METRONIDAZOLE 500 MG: 500 INJECTION, SOLUTION INTRAVENOUS at 13:51

## 2021-06-27 RX ADMIN — INSULIN GLARGINE 12 UNITS: 100 INJECTION, SOLUTION SUBCUTANEOUS at 13:47

## 2021-06-27 RX ADMIN — METRONIDAZOLE 500 MG: 500 INJECTION, SOLUTION INTRAVENOUS at 04:05

## 2021-06-27 RX ADMIN — FLUTICASONE PROPIONATE 1 SPRAY: 50 SPRAY, METERED NASAL at 08:20

## 2021-06-27 RX ADMIN — CEFAZOLIN SODIUM 2000 MG: 2 SOLUTION INTRAVENOUS at 13:47

## 2021-06-27 RX ADMIN — CLOTRIMAZOLE: 0.01 CREAM TOPICAL at 20:51

## 2021-06-27 RX ADMIN — DOCUSATE SODIUM 100 MG: 100 CAPSULE ORAL at 18:08

## 2021-06-27 RX ADMIN — CEFAZOLIN SODIUM 2000 MG: 2 SOLUTION INTRAVENOUS at 20:51

## 2021-06-27 RX ADMIN — DOCUSATE SODIUM 100 MG: 100 CAPSULE ORAL at 08:15

## 2021-06-27 RX ADMIN — ASPIRIN 81 MG: 81 TABLET, COATED ORAL at 08:15

## 2021-06-27 NOTE — ASSESSMENT & PLAN NOTE
History of anemia, hemoglobin baseline 11-13, hemoglobin stable  · Continue home ferrous sulfate 325 ; will give every other day instead of daily while inpatient  · Bowel regimen daily  · May need MMA, folate, iron panel as outpatient  · Continue to monitor daily CBC

## 2021-06-27 NOTE — H&P
INTERNAL MEDICINE RESIDENCY ADMISSION H&P     Name: Amado Oakes   Age & Sex: 80 y o  male   MRN: [id number]  Unit/Bed#: MARTINEZ   Encounter: [id number]  Primary Care Provider: Myrna Sheffield MD    Code Status: Level 3 - DNAR and DNI  Admission Status: INPATIENT   Disposition: Patient requires Med/Surg    Admit to team: SOD Team B     ASSESSMENT/PLAN     Principal Problem:    Right second toe ulcer ([facility] )  Active Problems:    Dementia without behavioral disturbance ([place] [address] )    History of coronary artery disease    Peripheral vascular disease in diabetes mellitus ([place] [address] )    BPH (benign prostatic hyperplasia)    Type 2 diabetes mellitus, with long-term current use of insulin ([facility])    Diastolic heart failure ([place] [address] )    Hyperlipidemia    Hypertension    Anemia      * Right second toe ulcer ([facility] )  Assessment & Plan  Patient presents with 2nd right toe, dorsal surface, ulcer, see image under media on admission  In context of type 2 diabetes on insulin and patient is wheelchair-bound, with known history of peripheral vascular disease, old CAD and CVA  Leukocytosis on admission 11 5, but patient is afebrile, no tachycardia no tachypnea, blood pressure WNL     Plan  Consult Podiatry, input appreciated  X-ray foot three views, follow results  Diabetic management  Continue home aspirin Plavix  Venous Doppler ultrasound  Considering antibiotics; IV Flagyl and Ancef per Podiatry recommendations  Daily CBC  Monitor vitals  Wound care        Anemia  Assessment & Plan  History of anemia, hemoglobin baseline 11-13, 11 4 on admission, no evidence of bleeding, MCV 95    Plan  Continue home ferrous sulfate 325 ; will give every other day instead of daily while inpatient  Bowel regimen daily Colace and MiraLax p r n  May need MMA, folate, iron panel as outpatient      Hypertension  Assessment & Plan  History of hypertension, blood pressure well controlled on admission 130s over 60s    Plan  Continue home Lopressor 25 b i d    Continue Bumex 0 5 mg daily - diastolic heart failure  Monitor vitals    Hyperlipidemia  Assessment & Plan  Most recent lipid panel from 2014, WNL, LDL 54, home medication Lipitor 10 mg daily, history of CAD, and peripheral vascular disease  Plan  Lipid panel ordered, follow results  Continue on home Lipitor 10 mg daily for now, will check liver enzymes (CMP) 6/27 and consider increase Lipitor appropriate  Continue home aspirin Plavix    Diastolic heart failure (HCC)  Assessment & Plan  Wt Readings from Last 3 Encounters:   03/10/20 77 1 kg (170 lb)   09/10/19 77 1 kg (170 lb)   10/22/15 79 kg (174 lb 2 1 oz)     History of chronic diastolic heart failure, right lower extremity grade 1 edema on admission, denies shortness of breath, CTAB on admission  Plan  Continue home Lopressor 25 b i d  Continue home Bumex 0 5 mg q d  Continue home aspirin Plavix  Daily in/ out  Cardiac diet, Na <2G and <2L fluid     Type 2 diabetes mellitus, with long-term current use of insulin ([facility])  Assessment & Plan  Lab Results   Component Value Date    HGBA1C 6 9 (H) 06/26/2021       No results for input(s): POCGLU in the last 72 hours  Blood Sugar Average: Last 72 hrs:     Relatively controlled H A1c 6 9, home medications include Lantus 12 units at noon, and metformin 500    Plan  Hold metformin while inpatient  Continue Lantus 12 unit at noon time   Diabetic diet  Monitor glucose and adjust as needed    BPH (benign prostatic hyperplasia)  Assessment & Plan  Known history of large prostate, came with suprapubic Hays in place  Plan  Continue home Flomax 0 4 mg daily  Consider holding Myrbetriq given the suprapubic Hays in place    Peripheral vascular disease in diabetes mellitus ([facility] )  Assessment & Plan  Lab Results   Component Value Date    HGBA1C 6 9 (H) 06/26/2021       No results for input(s): POCGLU in the last 72 hours      Blood Sugar Average: Last 72 hrs:     History of peripheral vascular disease, old history of coronary artery disease and stroke, status post left BKA, presenting with right 2nd toe ulcer  Plan  Continue ASA 81 mg daily  Continue Plavix 75 mg daily  See 2nd right toe ulcer assessment and plan    History of coronary artery disease  Assessment & Plan  History of coronary artery disease, denies any chest pain or palpitation on admission    Plan  Continue aspirin 81 mg daily    Dementia without behavioral disturbance (HCC)  Assessment & Plan  History of dementia without behavioral disturbance, old history of stroke, AO x1 to self, baseline and stable  VTE Pharmacologic Prophylaxis: Enoxaparin (Lovenox)  VTE Mechanical Prophylaxis: sequential compression device    CHIEF COMPLAINT     Chief Complaint   Patient presents with    Toe Swelling     right 2nd toe infection      HISTORY OF PRESENT ILLNESS   Patient to the 80years old male with past medical history significant for coronary artery disease, peripheral vascular disease, old CVA, DM type 2 on insulin, hyperlipidemia, BPH, incontinence, hypertension, diastolic heart failure, dementia without behavioral changes, left BKA with prosthetic leg will lives in a group home and presented to the ED with right 2nd toe a ulcer on the dorsal surface  Per reviewing the documents from the group home, patient completed 10 days of cephalexin 500 mg b i d  From Bridget 15 to June 25, documents were uploaded to the media tab inpatient EMR  In the ED, patient was given IV Flagyl and Ancef x 1 does  Podiatry comes consulted and examined the patient  Recommended starting IV antibiotics, x-ray , and will follow on management plan  Patient is poor historian, known history of dementia, alert only to self, not place or time, however able to answer and provide information on why he is in the hospital, brother and sister names, and he confirms that he wants no intubation or resuscitation if needed stating "I would rather die if that happened"       REVIEW OF SYSTEMS Review of Systems   Review of system is limited due to poor historian AO times 1-2 as above; except for the following:     Cardiovascular:  Denies chest pain or shortness of breath  GI:  Denies abdominal pain  Extremities:  Complains of right 2nd toe  Pain, rated as 5 in 10    OBJECTIVE     Vitals:    21 1645 21   BP: 130/68 130/66   BP Location:  Left arm   Pulse: 88 97   Resp: 16 16   Temp: 97 9 °F (36 6 °C)    TempSrc: Oral    SpO2: 99% 98%      Temperature:   Temp (24hrs), Av 9 °F (36 6 °C), Min:97 9 °F (36 6 °C), Max:97 9 °F (36 6 °C)    Temperature: 97 9 °F (36 6 °C)  Intake & Output:  I/O        07 -  0700 701 -  0700    Urine  400    Total Output  400    Net  -400              Weights: There is no height or weight on file to calculate BMI  Weight (last 2 days)     None        Physical Exam  Constitutional:       Comments: AO times 1-2 to self and others, elderly, supine in bed, in no acute distress   HENT:      Head:      Comments: Keratotic lesions on the scalp     Nose: Nose normal       Mouth/Throat:      Mouth: Mucous membranes are moist       Pharynx: No posterior oropharyngeal erythema  Eyes:      Extraocular Movements: Extraocular movements intact  Conjunctiva/sclera: Conjunctivae normal       Pupils: Pupils are equal, round, and reactive to light  Cardiovascular:      Rate and Rhythm: Normal rate and regular rhythm  Heart sounds: Murmur heard  No gallop  Comments: Grade 1 systolic murmur heard best and left 2nd intercostal space  Pulmonary:      Effort: Pulmonary effort is normal  No respiratory distress  Breath sounds: Normal breath sounds  No wheezing  Abdominal:      General: Abdomen is flat  Bowel sounds are normal  There is no distension  Palpations: Abdomen is soft  There is no mass  Tenderness: There is no abdominal tenderness  Musculoskeletal:         General: Deformity present        Right lower leg: Edema present  Comments: Right lower extremity edema grade 1 nonpitting, 2nd toe a ulcer on the dorsal surface see media for images; left BKA with prosthetic leg in place   Skin:     General: Skin is warm  Capillary Refill: Capillary refill takes less than 2 seconds  Coloration: Skin is not jaundiced  Neurological:      General: No focal deficit present  Cranial Nerves: No cranial nerve deficit  Sensory: No sensory deficit  Motor: No weakness  Comments: Oriented time  1-2 to self and person   Psychiatric:         Behavior: Behavior normal           PAST MEDICAL HISTORY     Past Medical History:   Diagnosis Date    Anemia     [facility] )     Diabetes mellitus ([facility] )     Diastolic heart failure (HCC)     Hyperlipidemia     Hypertension     Osteoarthritis     PVD (peripheral vascular disease) ([facility] )      PAST SURGICAL HISTORY     Past Surgical History:   Procedure Laterality Date    AMPUTATION Left     left bka     SOCIAL & FAMILY HISTORY     Social History     Substance and Sexual Activity   Alcohol Use No     Social History     Substance and Sexual Activity   Drug Use Never     Social History     Tobacco Use   Smoking Status Never Smoker   Smokeless Tobacco Never Used     History reviewed  No pertinent family history  LABORATORY DATA     Labs: I have personally reviewed pertinent reports      Results from last 7 days   Lab Units 06/26/21 1917   WBC Thousand/uL 11 54*   HEMOGLOBIN g/dL 11 4*   HEMATOCRIT % 35 2*   PLATELETS Thousands/uL 349   NEUTROS PCT % 72   MONOS PCT % 5      Results from last 7 days   Lab Units 06/26/21  1916   POTASSIUM mmol/L 4 1   CHLORIDE mmol/L 102   CO2 mmol/L 30   BUN mg/dL 13   CREATININE mg/dL 1 06   CALCIUM mg/dL 9 5                  Results from last 7 days   Lab Units 06/26/21 1917   LACTIC ACID mmol/L 1 6         Micro:  Lab Results   Component Value Date    URINECX >100,000 cfu/ml  09/10/2019    URINECX >100,000 cfu/ml Proteus mirabilis (A) 08/15/2019    URINECX >100,000 cfu/ml Pseudomonas aeruginosa (A) 08/15/2019     IMAGING & DIAGNOSTIC TESTS     Imaging: I have personally reviewed pertinent reports  No results found  EKG, Pathology, and Other Studies: I have personally reviewed pertinent reports  ALLERGIES   No Known Allergies  MEDICATIONS PRIOR TO ARRIVAL     Prior to Admission medications    Medication Sig Start Date End Date Taking?  Authorizing Provider   acetaminophen (TYLENOL) 500 mg tablet Take 500 mg by mouth every 6 (six) hours as needed for mild pain    Historical Provider, MD   aspirin (ECOTRIN LOW STRENGTH) 81 mg EC tablet Take 81 mg by mouth daily    Historical Provider, MD   bumetanide (BUMEX) 0 5 MG tablet Take 0 5 mg by mouth daily    Historical Provider, MD   cholecalciferol (VITAMIN D3) 1,000 units tablet Take 1,000 Units by mouth daily    Historical Provider, MD   clopidogrel (PLAVIX) 75 mg tablet Take 75 mg by mouth daily    Historical Provider, MD   ferrous sulfate 325 (65 Fe) mg tablet Take 325 mg by mouth daily with breakfast    Historical Provider, MD   fluticasone (FLONASE) 50 mcg/act nasal spray fluticasone propionate 50 mcg/actuation nasal spray,suspension    Historical Provider, MD   glucose 40 % Take 15 g by mouth once    Historical Provider, MD   glucose blood test strip test blood sugar & accucheck fasting at 7am and 2 hours after    Historical Provider, MD   insulin glargine (LANTUS) 100 units/mL subcutaneous injection Inject under the skin daily at bedtime    Historical Provider, MD   INSULIN SYRINGE  5CC/28G 28G X 1/2" 0 5 ML MISC Monoject Insulin Safety Syringe 0 5 mL 29 gauge x 1/2"    Historical Provider, MD   ketoconazole (NIZORAL) 2 % cream Apply topically daily    Historical Provider, MD   Menthol-Zinc Oxide (CALMOSEPTINE EX) Apply 1 application topically    Historical Provider, MD   Menthol-Zinc Oxide (RISAMINE EX) Apply topically    Historical Provider, MD   metFORMIN (GLUCOPHAGE) 500 mg tablet Take 500 mg by mouth daily with breakfast    Historical Provider, MD   Mirabegron ER (MYRBETRIQ) 50 MG TB24 Take by mouth    Historical Provider, MD   NON FORMULARY 1 application as needed    Historical Provider, MD   nystatin-triamcinolone (MYCOLOG-II) ointment Apply 1 application topically 2 (two) times a day    Historical Provider, MD   pantoprazole (PROTONIX) 40 mg tablet Take 40 mg by mouth 2 (two) times a day    Historical Provider, MD   polyethylene glycol (GLYCOLAX) powder Take 17 g by mouth daily 3/4/19   Irina Das MD   [facility]) 0 4 mg 0 4 mg 2/13/11   Historical Provider, MD     MEDICATIONS ADMINISTERED IN LAST 24 HOURS     Medication Administration - last 24 hours from 06/25/2021 2212 to 06/26/2021 2212       Date/Time Order Dose Route Action Action by     06/26/2021 2013 ceFAZolin (ANCEF) IVPB (premix in dextrose) 2,000 mg 50 mL 0 mg Intravenous Stopped Billy Horvath RN     06/26/2021 2008 ceFAZolin (ANCEF) IVPB (premix in dextrose) 2,000 mg 50 mL 2,000 mg Intravenous [address] Lety Gonzales RN     06/26/2021 2121 metroNIDAZOLE (FLAGYL) IVPB (premix) 500 mg 100 mL 0 mg Intravenous Stopped Billy Horvath RN     06/26/2021 2017 metroNIDAZOLE (FLAGYL) IVPB (premix) 500 mg 100 mL 500 mg Intravenous New Bag Billy Horvath RN        CURRENT MEDICATIONS     Current Facility-Administered Medications   Medication Dose Route Frequency Provider Last Rate    acetaminophen  488 mg Oral Q6H PRN Eddie Rosa MD      [START ON 6/27/2021] aspirin  81 mg Oral Daily Eddie Rosa MD      [START ON 6/27/2021] atorvastatin  10 mg Oral Daily With Patricio Roberto MD      [START ON 6/27/2021] bumetanide  0 5 mg Oral Daily Eddie Rosa MD      [START ON 6/27/2021] cefazolin  2,000 mg Intravenous Q8H Eric Dumont DO      [START ON 6/27/2021] cholecalciferol  1,000 Units Oral Daily Eddie Rosa MD      [START ON 6/27/2021] clopidogrel  75 mg Oral Daily Rue Cradle MD Darnell      docusate sodium  100 mg Oral BID MD Felipe Mitchell [START ON 6/27/2021] enoxaparin  40 mg Subcutaneous Daily Penny Guerra MD      [START ON 6/27/2021] ferrous sulfate  325 mg Oral Daily With Breakfast MD Felipe Mitchell [START ON 6/27/2021] fluticasone  1 spray Each Nare Daily Penny Guerra MD      [START ON 6/27/2021] insulin glargine  12 Units Subcutaneous Daily Eric Dumont DO      metoprolol tartrate  25 mg Oral Q12H [address] Penny Guerra MD      [START ON 6/27/2021] metroNIDAZOLE  500 mg Intravenous Q8H Eric Dumont, DO      pantoprazole  40 mg Oral BID Penny Guerra MD      polyethylene glycol  17 g Oral Daily PRN Eric Dumont DO      [START ON 6/27/2021] tamsulosin  0 4 mg Oral Daily With [place] Guerra MD          acetaminophen, 488 mg, Q6H PRN  polyethylene glycol, 17 g, Daily PRN        Admission Time  I spent 1 hour admitting the patient  This involved direct patient contact where I performed a full history and physical, reviewing previous records, and reviewing laboratory and other diagnostic studies  Portions of the record may have been created with voice recognition software  Occasional wrong word or "sound a like" substitutions may have occurred due to the inherent limitations of voice recognition software    Read the chart carefully and recognize, using context, where substitutions have occurred     ==  Ramez Walker, [facility]  Internal Medicine Residency PGY-1

## 2021-06-27 NOTE — ASSESSMENT & PLAN NOTE
History of hypertension, blood pressure well controlled on admission 130s over 60s  · Continue home Lopressor 25 b i d   · Continue Bumex 0 5 mg daily - diastolic heart failure  · Monitor vitals

## 2021-06-27 NOTE — ASSESSMENT & PLAN NOTE
Most recent lipid panel 6/27 Chol 127, TG 88, HDL 54, LDL 55  home medication Lipitor 10 mg daily, history of CAD, and peripheral vascular disease     · Continue on home Lipitor 10 mg daily for now  · Continue home aspirin Plavix

## 2021-06-27 NOTE — ASSESSMENT & PLAN NOTE
Known history of large prostate, came with suprapubic Hays in place     · Continue home Flomax 0 4 mg daily  · Consider holding Myrbetriq given the suprapubic Hays in place

## 2021-06-27 NOTE — ASSESSMENT & PLAN NOTE
Lab Results   Component Value Date    HGBA1C 6 9 (H) 06/26/2021       Recent Labs     07/01/21  1554 07/01/21  2105 07/02/21  0647 07/02/21  1127   POCGLU 161* 152* 95 183*       Blood Sugar Average: Last 72 hrs:  (P) 119 [id number]   Relatively controlled H A1c 6 9  Home medications include Lantus 12 units at noon, and metformin 500  · Hold metformin while inpatient  · Decreased Lantus to 6 units in setting of decreased oral intake and sugars in 's    · Monitor glucose and adjust as needed

## 2021-06-27 NOTE — ASSESSMENT & PLAN NOTE
Attending admitted noted on admission including scald and different areas mainly bilateral groin and right lower extremity  · Clotrimazole cream  · Wound care consult

## 2021-06-27 NOTE — ASSESSMENT & PLAN NOTE
History of dementia without behavioral disturbance, old history of stroke, AO x1 to self, baseline and stable  His niece, Mckenna Rendon, is POA   Contact at [phone]

## 2021-06-27 NOTE — ASSESSMENT & PLAN NOTE
S/p L BKA  6/27 Lower limb arterial duplex - Diffuse disease noted throughout the femoropopliteal arteries without  significant focal stenosis  Evidence suggestive of Tibioperoneal occlusive disease  Ankle/Brachial index: Non-compressible  Metatarsal pressure and Great toe pressure unobtainable secondary to attenuated PPG waveform  PVR/ PPG tracings are dampened    · Continue ASA 81 mg daily  · Continue Plavix 75 mg daily  · See 2nd right toe ulcer assessment and plan

## 2021-06-27 NOTE — ASSESSMENT & PLAN NOTE
Wt Readings from Last 3 Encounters:   07/02/21 62 kg (136 lb 11 oz)   03/10/20 77 1 kg (170 lb)   09/10/19 77 1 kg (170 lb)     History of chronic diastolic heart failure, right lower extremity grade 1 edema on admission, denies shortness of breath, CTAB on admission  Plan  · Continue home Lopressor 25 b i d   · Continue home Bumex 0 5 mg q d    · Daily in/out  · Cardiac diet, Na <2G and <2L fluid

## 2021-06-27 NOTE — ASSESSMENT & PLAN NOTE
History of coronary artery disease, denies any chest pain or palpitation on admission    Plan  · Continue aspirin 81 mg daily  · Continue Plavix 7 mg daily  · Continue atorvastatin 10 mg daily

## 2021-06-27 NOTE — QUICK NOTE
Patient admitted early morning for right 2nd toe ulcer, x-ray concerning for osteomyelitis  Started on Ancef/Flagyl per Podiatry recommendations  Patient seen and examined this morning at bedside  Remains hemodynamically stable without acute complaint  Medical updates given to Liz Pfeiffer [phone]  Discussed with Podiatry, potential surgical intervention pending family's consent

## 2021-06-27 NOTE — CONSULTS
Podiatry - Consultation    Patient Information:   Kina Breaux 80 y o  male MRN: [id number]  Unit/Bed#: [facility] Encounter: [id number]  PCP: Davis Arana MD  Date of Admission:  6/26/2021  Date of Consultation: 06/26/21  Requesting Physician: Helen Lyn MD      ASSESSMENT:    Kina Breaux is a 80 y o  male with:    1  R 2nd digit ulceration probing to bone - Aparicio 3 - POA  2  T2DM  3  Dementia    PLAN:    · Recommend admission for IV antibiotics and potential surgical intervention to right 2nd digit pending patient, and patient's family's wishes regarding his care  · Local wound care consisting of Betadine-soaked Adaptic, DSD  Wound care instructions placed  · Follow-up x-ray  · Follow-up LEADS  · Elevation on green foam wedges or pillows when non-ambulatory  · Rest of care per primary team   · Will discuss this plan with my attending and update as needed  Weightbearing status:  Weight-bearing as tolerated    SUBJECTIVE:    History of Present Illness:    Kina Breaux is a 80 y o  male who is originally admitted 6/26/2021 due to a right 2nd digit ulceration  Patient has a past medical history of dementia and type 2 diabetes, Rest a past medical history largely unknown secondary to the patient being a poor historian  We are consulted for evaluation and management of the patient's right 2nd digit  The patient states that his right 2nd digit has been present for 6 days    Earlier in the evening, he told another resident that his ulceration has been present for 2 weeks  The patient does endorse a history of type 2 diabetes  The patient also endorses a history left leg amputation which he states he had performed years ago    The patient states that his right 2nd digit does not hurt, however the patient does endorse pain on palpation    The patient denies seeing an outpatient podiatrist for his wound care and states that his right 2nd digit ulceration has been taking care of by his nursing home staff     Review of Systems:    Constitutional: Negative  HENT: Negative  Eyes: Negative  Respiratory: Negative  Cardiovascular: Negative  Gastrointestinal: Negative  Musculoskeletal:  Right 2nd digit pain, history of left below-knee amputation, hallux abductovalgus deformity right foot  Skin:  Ulceration right 2nd digit   Neurological:  Peripheral neuropathy   Psych: Negative  Past Medical and Surgical History:     Past Medical History:   Diagnosis Date    Anemia     D[facility] [address] [facility]     [facility] mellitus ([facility] )     [facility] failure [facility])     Hyperlipidemia     Hypertension     Osteoarthritis     PVD (peripheral vascular disease) ([facility])        Past Surgical History:   Procedure Laterality Date    AMPUTATION Left     left bka       Meds/Allergies:    (Not in a hospital admission)      No Known Allergies    Social History:     Marital Status:     Substance Use History:   Social History     Substance and Sexual Activity   Alcohol Use No     Social History     Tobacco Use   Smoking Status Never Smoker   Smokeless Tobacco Never Used     Social History     Substance and Sexual Activity   Drug Use Never       Family History:    History reviewed  No pertinent family history  OBJECTIVE:    Vitals:   Blood Pressure: 130/66 (06/26/21 2013)  Pulse: 97 (06/26/21 2013)  Temperature: 97 9 °F (36 6 °C) (06/26/21 1645)  Temp Source: Oral (06/26/21 1645)  Respirations: 16 (06/26/21 2013)  SpO2: 98 % (06/26/21 2013)    Physical Exam:    General Appearance: Alert, cooperative, no distress  HEENT: Head normocephalic, atraumatic, without obvious abnormality  Heart: Normal rate and rhythm  Lungs: Non-labored breathing  No respiratory distress  Abdomen: Without distension  Psychiatric: AAOx3  Lower Extremity:  Vascular:   Right DP and PT pulses are absent  CRT < 3 seconds at the digits  +2/4 edema noted at the right lower extremity  Pedal hair is absent   Skin temperature is warmer to the right foot than was expected to be normal     Musculoskeletal:  MMT is 0/5 in all muscle compartments of the right ankle  Patient does have at least 2/5 muscle strength at the hip as he is able to move his right leg at the hip in the frontal plane  ROM at the 1st MPJ and ankle joint are decreased at the right lower extremity with the leg extended  Pain on palpation of of the right 2nd digit  History of left below-knee amputation noted    Dermatological:  Lower extremity wound(s) as noted below:    Wound #: 1  Location:  Right 2nd digit  Length 0 9 cm: Width 0 9 cm: Depth 0 3 cm:   Deepest Tissue Noted in Base:  Bone  Probe to Bone: Yes  Peripheral Skin Description: Attached  Granulation: 20% Fibrotic Tissue: 0% Necrotic Tissue: 80%   Drainage Amount: minimal, serosanguinous  Signs of Infection: Yes              Neurological:  Gross sensation is diminished  Protective sensation is absent  Patient Reports numbness and/or paresthesias  Additional data:     Lab Results: I have personally reviewed pertinent labs including:    Results from last 7 days   Lab Units 06/26/21  1917   WBC Thousand/uL 11 54*   HEMOGLOBIN g/dL 11 4*   HEMATOCRIT % 35 2*   PLATELETS Thousands/uL 349   NEUTROS PCT % 72   LYMPHS PCT % 17   MONOS PCT % 5   EOS PCT % 5     Results from last 7 days   Lab Units 06/26/21  1916   POTASSIUM mmol/L 4 1   CHLORIDE mmol/L 102   CO2 mmol/L 30   BUN mg/dL 13   CREATININE mg/dL 1 06   CALCIUM mg/dL 9 5           Cultures: I have personally reviewed pertinent cultures including:              Imaging: I have personally reviewed pertinent reports in PACS  EKG, Pathology, and Other Studies: I have personally reviewed pertinent reports  ** Please Note: Portions of the record may have been created with voice recognition software  Occasional wrong word or "sound a like" substitutions may have occurred due to the inherent limitations of voice recognition software   Read the chart carefully and recognize, using context, where substitutions have occurred   **

## 2021-06-27 NOTE — ASSESSMENT & PLAN NOTE
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 confirmed by foot x-ray  Leukocytosis on admission 11 5, now 8 46  CRP of 18 4, ESR 59 on admission  Patient is afebrile, no tachycardia no tachypnea, blood pressure WNL  · Podiatry following, appreciate recommendations  Patient and SOLIS Bingham) leaning toward non-surgical treatment  · ID consulted, recommending holding antibiotic therapy for 48-72 hours and biopsy of the right 2nd toe ulcer to help guide and optimized therapy  · Culture of toe ulcer taken 7/2 -> f/u results to help guide antibiotic treatment, results pending  · Patient started on empiric ceftriaxone and Flagyl as per ID recommendations  · 7/3/21 -- Patient will likely ineed a picc line for long-term antibiotic upon discharge to SNF -- d/w POA regarding that and she is agreeable  · Daily CBC  · Monitor vitals  · Wound care  · P r n   Tylenol

## 2021-06-28 PROBLEM — K59.00 CONSTIPATION: Status: ACTIVE | Noted: 2021-06-28

## 2021-06-28 LAB
ANION GAP SERPL CALCULATED.3IONS-SCNC: 6 MMOL/L (ref 4–13)
BASOPHILS # BLD AUTO: 0.03 THOUSANDS/ΜL (ref 0–0.1)
BASOPHILS NFR BLD AUTO: 0 % (ref 0–1)
BUN SERPL-MCNC: 15 MG/DL (ref 5–25)
CALCIUM SERPL-MCNC: 8.8 MG/DL (ref 8.3–10.1)
CHLORIDE SERPL-SCNC: 105 MMOL/L (ref 100–108)
CO2 SERPL-SCNC: 27 MMOL/L (ref 21–32)
CREAT SERPL-MCNC: 1.06 MG/DL (ref 0.6–1.3)
EOSINOPHIL # BLD AUTO: 0.45 THOUSAND/ΜL (ref 0–0.61)
EOSINOPHIL NFR BLD AUTO: 5 % (ref 0–6)
ERYTHROCYTE [DISTWIDTH] IN BLOOD BY AUTOMATED COUNT: 12.2 % (ref 11.6–15.1)
GFR SERPL CREATININE-BSD FRML MDRD: 61 ML/MIN/1.73SQ M
GLUCOSE SERPL-MCNC: 100 MG/DL (ref 65–140)
GLUCOSE SERPL-MCNC: 102 MG/DL (ref 65–140)
GLUCOSE SERPL-MCNC: 110 MG/DL (ref 65–140)
GLUCOSE SERPL-MCNC: 117 MG/DL (ref 65–140)
GLUCOSE SERPL-MCNC: 121 MG/DL (ref 65–140)
GLUCOSE SERPL-MCNC: 140 MG/DL (ref 65–140)
HCT VFR BLD AUTO: 31.3 % (ref 36.5–49.3)
HGB BLD-MCNC: 10 G/DL (ref 12–17)
IMM GRANULOCYTES # BLD AUTO: 0.04 THOUSAND/UL (ref 0–0.2)
IMM GRANULOCYTES NFR BLD AUTO: 0 % (ref 0–2)
LYMPHOCYTES # BLD AUTO: 1.33 THOUSANDS/ΜL (ref 0.6–4.47)
LYMPHOCYTES NFR BLD AUTO: 14 % (ref 14–44)
MCH RBC QN AUTO: 30.3 PG (ref 26.8–34.3)
MCHC RBC AUTO-ENTMCNC: 31.9 G/DL (ref 31.4–37.4)
MCV RBC AUTO: 95 FL (ref 82–98)
MONOCYTES # BLD AUTO: 0.58 THOUSAND/ΜL (ref 0.17–1.22)
MONOCYTES NFR BLD AUTO: 6 % (ref 4–12)
NEUTROPHILS # BLD AUTO: 6.87 THOUSANDS/ΜL (ref 1.85–7.62)
NEUTS SEG NFR BLD AUTO: 75 % (ref 43–75)
NRBC BLD AUTO-RTO: 0 /100 WBCS
PLATELET # BLD AUTO: 305 THOUSANDS/UL (ref 149–390)
PMV BLD AUTO: 9.7 FL (ref 8.9–12.7)
POTASSIUM SERPL-SCNC: 3.7 MMOL/L (ref 3.5–5.3)
RBC # BLD AUTO: 3.3 MILLION/UL (ref 3.88–5.62)
SODIUM SERPL-SCNC: 138 MMOL/L (ref 136–145)
WBC # BLD AUTO: 9.3 THOUSAND/UL (ref 4.31–10.16)

## 2021-06-28 PROCEDURE — 97166 OT EVAL MOD COMPLEX 45 MIN: CPT

## 2021-06-28 PROCEDURE — 82948 REAGENT STRIP/BLOOD GLUCOSE: CPT

## 2021-06-28 PROCEDURE — 93922 UPR/L XTREMITY ART 2 LEVELS: CPT | Performed by: SURGERY

## 2021-06-28 PROCEDURE — 85025 COMPLETE CBC W/AUTO DIFF WBC: CPT | Performed by: STUDENT IN AN ORGANIZED HEALTH CARE EDUCATION/TRAINING PROGRAM

## 2021-06-28 PROCEDURE — 80048 BASIC METABOLIC PNL TOTAL CA: CPT | Performed by: STUDENT IN AN ORGANIZED HEALTH CARE EDUCATION/TRAINING PROGRAM

## 2021-06-28 PROCEDURE — 97163 PT EVAL HIGH COMPLEX 45 MIN: CPT

## 2021-06-28 PROCEDURE — 93926 LOWER EXTREMITY STUDY: CPT | Performed by: SURGERY

## 2021-06-28 RX ORDER — BISACODYL 10 MG
10 SUPPOSITORY, RECTAL RECTAL DAILY PRN
Status: DISCONTINUED | OUTPATIENT
Start: 2021-06-28 | End: 2021-07-13 | Stop reason: HOSPADM

## 2021-06-28 RX ORDER — POLYETHYLENE GLYCOL 3350 17 G/17G
17 POWDER, FOR SOLUTION ORAL DAILY
Status: DISCONTINUED | OUTPATIENT
Start: 2021-06-28 | End: 2021-07-13 | Stop reason: HOSPADM

## 2021-06-28 RX ORDER — AMOXICILLIN 250 MG
1 CAPSULE ORAL
Status: DISCONTINUED | OUTPATIENT
Start: 2021-06-28 | End: 2021-06-28

## 2021-06-28 RX ORDER — AMOXICILLIN 250 MG
2 CAPSULE ORAL
Status: DISCONTINUED | OUTPATIENT
Start: 2021-06-28 | End: 2021-07-13 | Stop reason: HOSPADM

## 2021-06-28 RX ORDER — LANOLIN ALCOHOL/MO/W.PET/CERES
3 CREAM (GRAM) TOPICAL
Status: DISCONTINUED | OUTPATIENT
Start: 2021-06-28 | End: 2021-07-13 | Stop reason: HOSPADM

## 2021-06-28 RX ADMIN — ATORVASTATIN CALCIUM 10 MG: 20 TABLET, FILM COATED ORAL at 16:26

## 2021-06-28 RX ADMIN — DOCUSATE SODIUM 100 MG: 100 CAPSULE ORAL at 11:01

## 2021-06-28 RX ADMIN — CLOTRIMAZOLE: 0.01 CREAM TOPICAL at 11:07

## 2021-06-28 RX ADMIN — METRONIDAZOLE 500 MG: 500 INJECTION, SOLUTION INTRAVENOUS at 20:44

## 2021-06-28 RX ADMIN — FLUTICASONE PROPIONATE 1 SPRAY: 50 SPRAY, METERED NASAL at 11:07

## 2021-06-28 RX ADMIN — TAMSULOSIN HYDROCHLORIDE 0.4 MG: 0.4 CAPSULE ORAL at 16:26

## 2021-06-28 RX ADMIN — PANTOPRAZOLE SODIUM 40 MG: 40 TABLET, DELAYED RELEASE ORAL at 11:02

## 2021-06-28 RX ADMIN — CLOPIDOGREL BISULFATE 75 MG: 75 TABLET ORAL at 11:02

## 2021-06-28 RX ADMIN — METOPROLOL TARTRATE 25 MG: 25 TABLET, FILM COATED ORAL at 08:47

## 2021-06-28 RX ADMIN — PANTOPRAZOLE SODIUM 40 MG: 40 TABLET, DELAYED RELEASE ORAL at 18:19

## 2021-06-28 RX ADMIN — INSULIN GLARGINE 12 UNITS: 100 INJECTION, SOLUTION SUBCUTANEOUS at 11:02

## 2021-06-28 RX ADMIN — METRONIDAZOLE 500 MG: 500 INJECTION, SOLUTION INTRAVENOUS at 04:28

## 2021-06-28 RX ADMIN — Medication 1000 UNITS: at 11:02

## 2021-06-28 RX ADMIN — CEFAZOLIN SODIUM 2000 MG: 2 SOLUTION INTRAVENOUS at 11:06

## 2021-06-28 RX ADMIN — METRONIDAZOLE 500 MG: 500 INJECTION, SOLUTION INTRAVENOUS at 12:22

## 2021-06-28 RX ADMIN — CLOTRIMAZOLE 1 APPLICATION: 0.01 CREAM TOPICAL at 18:19

## 2021-06-28 RX ADMIN — CEFAZOLIN SODIUM 2000 MG: 2 SOLUTION INTRAVENOUS at 04:28

## 2021-06-28 RX ADMIN — BUMETANIDE 0.5 MG: 0.5 TABLET ORAL at 11:07

## 2021-06-28 RX ADMIN — POLYETHYLENE GLYCOL 3350 17 G: 17 POWDER, FOR SOLUTION ORAL at 13:36

## 2021-06-28 RX ADMIN — ASPIRIN 81 MG: 81 TABLET, COATED ORAL at 11:01

## 2021-06-28 RX ADMIN — ENOXAPARIN SODIUM 40 MG: 40 INJECTION SUBCUTANEOUS at 11:02

## 2021-06-28 RX ADMIN — CEFAZOLIN SODIUM 2000 MG: 2 SOLUTION INTRAVENOUS at 19:43

## 2021-06-28 RX ADMIN — FERROUS SULFATE TAB 325 MG (65 MG ELEMENTAL FE) 325 MG: 325 (65 FE) TAB at 11:02

## 2021-06-28 NOTE — OCCUPATIONAL THERAPY NOTE
Occupational Therapy Evaluation     Patient Name: Maynor NICOLE Date: 6/28/2021  Problem List  Principal Problem:    Right second toe ulcer ([facility] [address] )  Active Problems:    Dementia without behavioral disturbance ([facility] [address] )    History of coronary artery disease    Peripheral vascular disease in diabetes mellitus (HCC)    BPH (benign prostatic hyperplasia)    Type 2 diabetes mellitus, with long-term current use of insulin (HCC)    Diastolic heart failure (HCC)    Hyperlipidemia    Hypertension    Anemia    Hearing difficulty of both ears    Actinic dermatitis    Constipation    Past Medical History  Past Medical History:   Diagnosis Date    Anemia     Dementia ([facility] [facility]     Diabetes mellitus ([facility] [facility]     Diastolic heart failure (HCC)     Hyperlipidemia     Hypertension     Osteoarthritis     PVD (peripheral vascular disease) ([facility] [address] )      Past Surgical History  Past Surgical History:   Procedure Laterality Date    AMPUTATION Left     left bka      06/28/21 1500   OT Last Visit   OT Visit Date 06/28/21   Note Type   Note type Evaluation   Restrictions/Precautions   Weight Bearing Precautions Per Order Yes   RLE Weight Bearing Per Order WBAT  (per podiatry )   LLE Weight Bearing Per Order Other  (H/O BKA- PROSTHESIS IN ROOM )   Braces or Orthoses Prosthesis   Other Precautions Cognitive; Bed Alarm;WBS;Fall Risk;Pain;Hard of hearing   Pain Assessment   Pain Assessment Tool FLACC   Pain Location/Orientation Orientation: Right;Location: Foot   Patient's Stated Pain Goal No pain   Hospital Pain Intervention(s) Repositioned; Emotional support   Pain Rating: FLACC (Rest) - Face 0   Pain Rating: FLACC (Rest) - Legs 0   Pain Rating: FLACC (Rest) - Activity 0   Pain Rating: FLACC (Rest) - Cry 0   Pain Rating: FLACC (Rest) - Consolability 0   Score: FLACC (Rest) 0   Pain Rating: FLACC (Activity) - Face 1   Pain Rating: FLACC (Activity) - Legs 1   Pain Rating: FLACC (Activity) - Activity 1   Pain Rating: FLACC (Activity) - Cry 1 Pain Rating: FLACC (Activity) - Consolability 0   Score: FLACC (Activity) 4   Home Living   Type of Home SNF   Home Equipment Walker; Wheelchair-manual   Additional Comments PT IS A POOR HISTORIAN- PT REPORTS BEING FROM [facility] WHERE HE HAS ASSIST WITH ADLS/IADLS AND IS AX1 WITH TXFS/LIMITED MOBILITY  PT REPORTS HE IS UNABLE TO SELF PROPEL W/C  CM NOTE FOLLOWING SESSION REPORTS PT IS FROM [place] WHERE HE REQUIRES "MAX A WITH AMBULATION AND ADLS"    Prior Function   Level of [place] Needs assistance with IADLs; Needs assistance with ADLs and functional mobility   Lives With Facility staff   Receives Help From Personal care attendant   ADL Assistance Needs assistance   IADLs Needs assistance   Vocational Retired   Lifestyle   Autonomy PER CM "MAX A WITH ADLS"    Reciprocal Relationships FROM [place]   Service to Others RETIRED   Intrinsic Gratification UNKNOWN   Subjective   Subjective "DON'T TOUCH ME BECAUSE THE FOOD ISN'T GOOD HERE!"   ADL   Eating Assistance 4  Minimal Assistance   Grooming Assistance 4  Minimal Assistance   UB Bathing Assistance 3  Moderate Assistance   LB Bathing Assistance 2  Maximal Assistance   UB Dressing Assistance 3  Moderate Assistance   LB Dressing Assistance 2  Maximal [address]  2  Maximal Assistance   Functional Assistance 2  Maximal Assistance   Bed Mobility   Supine to Sit 2  Maximal assistance   Additional items Assist x 2; Increased time required;Verbal cues;LE management   Sit to Supine 2  Maximal assistance   Additional items Assist x 2; Increased time required;Verbal cues;LE management   Additional Comments PT REQUIRED MAX A X2 FOR SUPINE->SIT TXF  RETROPULSIVE IN SITTING  WITH R LATERAL LEAN  PT SAT EOB FOR ~1 MINUTE, IMPROVEMENT NOTED WHILE DISTRACTED HOWEVER PT THEN BECAME AGITATED AGAIN AND MILDLY AGRESSIVE TOWARDS STAFF  AND IMPUSLIVELY RETURED TO SUPINE REQUIRING MAX A X2 FOR SAFETY      Balance   Static Sitting Poor + Dynamic Sitting Poor   Activity Tolerance   Activity Tolerance Patient limited by fatigue;Treatment limited secondary to agitation   Medical Staff Made Aware PT SEEN FOR CO-SESSION WITH SKILLED PHYSICAL THERAPIST 2' CLINICALLY UNSTABLE PRESENTATION, NEW PRECAUTIONS/LIMITATIONS, LIMITED ACTIVITY TOLERANCE AND PRESENT IMPAIRMENTS WHICH ARE A REGRESSION FROM THE PT'S BASELINE AND IMPACTING OVERALL OCCUPATIONAL PERFORMANCE  RUE Assessment   RUE Assessment WFL  (GENERALIZED WEAKNESS)   LUE Assessment   LUE Assessment WFL  (GENERALIZED WEAKNESS)   Cognition   Overall Cognitive Status Impaired   Arousal/Participation Uncooperative;Responsive   Attention Difficulty attending to directions   Orientation Level Oriented to person;Disoriented to place; Disoriented to time;Disoriented to situation   Memory Decreased short term memory;Decreased recall of recent events;Decreased recall of precautions;Decreased recall of biographical information   Following Commands Follows one step commands inconsistently   Comments PT WITH BASELINE DEMENTIA- AGITATED AND MILDLY AGRESSIVE TOWARD STAFF  LIMITED INSIGHT/JUDGEMENT/SAFETY AWARENESS  RECOMMEND ALARM ON FOR SAFETY  Assessment   Assessment 79 YO Male SEEN FOR INITIAL OCCUPATIONAL THERAPY EVALUATION FOLLOWING ADMISSION TO [facility] WITH R 2ND TOE ULCER CONCERNING FROM OSTEOMYELITIS  PER PODIATRY, PT IS WBAT ON RLE  PROBLEMS LIST INCLUDES CAD, PVD, DM, BPH, HLD, HTN AND H/O L BKA WITH PROSTHESIS  PT IS A POOR HISTORIAN, ORIENTED TO PERSON INCLUDING NAME ONLY  PT CURRENTLY REQUIRES OVERALL MAX-TOTAL A WITH ADLS  PT REQUIRED MAX A X2 FOR SUPINE->SIT TXF  RETROPULSIVE IN SITTING  WITH R LATERAL LEAN  PT SAT EOB FOR ~1 MINUTE, IMPROVEMENT NOTED WHILE DISTRACTED HOWEVER PT THEN BECAME AGITATED AGAIN AND MILDLY AGRESSIVE TOWARDS STAFF  AND IMPUSLIVELY RETURED TO SUPINE REQUIRING MAX A X2 FOR SAFETY    PT IS LIMITED 2' PAIN, FATIGUE, IMPAIRED BALANCE, FALL RISK , LIMITED SAFETY AWARENESS/INSIGHT/JUDGEMENT, IMPULSIVE, DISORIENTATION, OVERALL WEAKNESS/DECONDITIONING  and OVERALL LIMITED ACTIVITY TOLERANCE  The patient's raw score on the AM-PAC Daily Activity inpatient short form is 12, standardized score is 30 6, less than 39 4  Patients at this level are likely to benefit from discharge to post-acute rehabilitation services  Please refer to the recommendation of the Occupational Therapist for safe discharge planning  HOWEVER, FOLLOWING INITIAL OT EVAL, DAVIE OPEN [facility]TS [facility] MAX A WITH AMBULATION AND ADLS  PT'S CHART REPORTS FAMILY WISHES FOR PT TO RETURN  FROM AN OT PERSPECTIVE, PT IS BELIEVED TO BE FUNCTIONING SIMILAR TO BASELINE, ANTICIPATE PT CAN RETURN TO PCF WITH APPROPRIATE LEVEL OF CARE WHEN MEDICALLY CLEARED  NO ADDITIONAL ACUTE CARE OT NEEDS  D/C OT     Goals   Patient Goals TO EAT BETTER FOOD    Recommendation   OT Discharge Recommendation No rehabilitation needs  (ANTICIPATE RETURN TO PCF WITH APPROPRIATE LEVEL OF CARE )   OT - OK to Discharge Yes   AM-PAC Daily Activity Inpatient   Lower Body Dressing 1   Bathing 1   Toileting 2   Upper Body Dressing 2   Grooming 3   Eating 3   Daily Activity Raw Score 12   Daily Activity Standardized Score (Calc for Raw Score >=11) 30 6   AM-PAC Applied Cognition Inpatient   Following a Speech/Presentation 2   Understanding Ordinary Conversation 3   Taking Medications 2   Remembering Where Things Are Placed or Put Away 2   Remembering List of 4-5 Errands 2   Taking Care of Complicated Tasks 2   Applied Cognition Raw Score 13   Applied Cognition Standardized Score 30 46   Modified Superior Scale   Modified Superior Scale 4       Documentation completed by PIPER Sebastian, OTR/L

## 2021-06-28 NOTE — QUICK NOTE
I had an extended discussion over the phone with this patient's reported power of , his niece Deedee Sprague  I provided her a clinical update regarding her nephew's current status here in the hospital regarding his right toe ulcer  She stated she has spoken both with podiatry and MS[facility] Eduardo Gabriel regarding potential surgical plans and she feels like she is erring on the side of choosing against operative management and instead focusing only on antibiotics  She states that she believes the patient would not want surgery for his toe  She also states that she would not want to make a definitive choice regarding surgery until she speaks with palliative medicine  When I asked her why she needs to speak with palliative, she stated that she wants to focus on the patient's comfort and good days" in a familiar environment as opposed to bad days in the hospital   When I mentioned to her that this sounds more like hospice care which would involve withdrawal of treatment directed therapies and focus on comfort, she stated that this was not her intention and that she would like to have this patient complete antibiotics first   When I made her aware that she, as his reported POA, can make the decision to transition to comfort care at any point, including after IV antibiotics, she then stated that she wants to speak with someone from palliative care to ensure that services and help would be available in this regard at this patient's previous environment, i e  His nursing home  Complicating this decision is the fact that his POA tells me she recently lost her  within the last 2 weeks to cholangiocarcinoma, and that he spent his last few months in an inpatient hospice facility  Given this bizarre circumstance, I did discuss the case with Karina Gray PA-C, to see if this particular circumstance would warrant a palliative care consult  She stated that the consult would be a good idea at this time    Consult has been placed

## 2021-06-28 NOTE — PHYSICAL THERAPY NOTE
Physical Therapy Evaluation     Patient's Name: Emerita Lozano    Admitting Diagnosis  Foot ulcer ([facility] ) [L97 509]  Chronic toe pain, right foot [X18 982, G89 29]    Problem List  Patient Active Problem List   Diagnosis    Dementia without behavioral disturbance ([place] [address] )    History of coronary artery disease    Peripheral vascular disease in diabetes mellitus ([facility] )    Right second toe ulcer ([place] [address] )    BPH (benign prostatic hyperplasia)    Type 2 diabetes mellitus, with long-term current use of insulin ([place] [address] )    Diastolic heart failure ([place] [address] )    Hyperlipidemia    Hypertension    Anemia    Hearing difficulty of both ears    Actinic dermatitis    Constipation       Past Medical History  Past Medical History:   Diagnosis Date    Anemia     Dementia ([place] [address] )     Diabetes mellitus ([place] [address] )     Diastolic heart failure (HCC)     Hyperlipidemia     Hypertension     Osteoarthritis     PVD (peripheral vascular disease) ([place] [address] )        Past Surgical History  Past Surgical History:   Procedure Laterality Date    AMPUTATION Left     left bka        06/28/21 1501   PT Last Visit   PT Visit Date 06/28/21   Note Type   Note type Evaluation   Pain Assessment   Pain Assessment Tool FLACC   Pain Rating: FLACC (Rest) - Face 0   Pain Rating: FLACC (Rest) - Legs 0   Pain Rating: FLACC (Rest) - Activity 0   Pain Rating: FLACC (Rest) - Cry 0   Pain Rating: FLACC (Rest) - Consolability 0   Score: FLACC (Rest) 0   Pain Rating: FLACC (Activity) - Face 1   Pain Rating: FLACC (Activity) - Legs 1   Pain Rating: FLACC (Activity) - Activity 1   Pain Rating: FLACC (Activity) - Cry 1   Pain Rating: FLACC (Activity) - Consolability 2   Score: FLACC (Activity) 6   Home Living   Type of Home SNF   Home Equipment Walker; Wheelchair-manual   Additional Comments Pt is poor historian, information obtained from chart   Prior Function   Level of [place] Needs assistance with ADLs and functional mobility   Lives With Facility staff   Receives Help From Personal care attendant   ADL Assistance Needs assistance   IADLs Needs assistance   Vocational Retired   Restrictions/Precautions   Wells Enio Bearing Precautions Per Order Yes   RLE Weight Bearing Per Order WBAT  (per podiarty note)   LLE Weight Bearing Per Order Other  (prosthesis for prior BKA)   Braces or Orthoses Prosthesis   Other Precautions Cognitive; Chair Alarm; Bed Alarm;Multiple lines; Fall Risk;Pain;Agitated   General   Family/Caregiver Present No   Cognition   Orientation Level Oriented to person   RLE Assessment   RLE Assessment   (grossly 3/5 with movement)   LLE Assessment   LLE Assessment   (grossly 3/5 at hip and knee, prior BKA)   Coordination   Movements are Fluid and Coordinated 0   Bed Mobility   Supine to Sit 2  Maximal assistance   Additional items Assist x 2   Sit to Supine 2  Maximal assistance   Additional items Assist x 2   Transfers   Sit to Stand Unable to assess   Stand to Sit Unable to assess   Ambulation/Elevation   Gait pattern Not appropriate   Balance   Static Sitting Poor +   Dynamic Sitting Poor   Endurance Deficit   Endurance Deficit Yes   Endurance Deficit Description limited by pain and willingness to participate   Activity Tolerance   Activity Tolerance Patient limited by fatigue;Patient limited by pain;Treatment limited secondary to agitation   Medical Staff Made Aware OT   Assessment   Prognosis Poor   Problem List Decreased strength;Decreased range of motion;Decreased endurance; Impaired balance;Decreased mobility; Decreased coordination;Decreased cognition;Decreased safety awareness; Impaired judgement;Pain   Assessment Pt is 80 y o  male seen for PT evaluation s/p admit to [facility] on 6/26/2021 w/ Right second toe ulcer ([address] )  PT consulted to assess pt's functional mobility and d/c needs  Order placed for PT eval and tx, w/ WBAT R LE order   Comorbidities affecting pt's physical performance at time of assessment include:  has a past medical history of Anemia, Dementia ([facility] ), Diabetes mellitus ([facility] ), Di[facility] failure ([facility] ), Hyperlipidemia, Hypertension, Osteoarthritis, and PVD (peripheral vascular disease) ([facility] )  PTA, pt was long term resident of SNF and retired  Personal factors affecting pt at time of IE include: ambulating w/ assistive device, inability to ambulate household distances, decreased cognition, hearing impairments, decreased initiation and engagement, impulsivity, compliance, unable to perform physical activity, limited insight into impairments, inability to perform IADLs and inability to perform ADLs  Please find objective findings from PT assessment regarding body systems outlined above with impairments and limitations including weakness, decreased ROM, impaired balance, decreased endurance, gait deviations, pain, decreased activity tolerance, decreased safety awareness, impaired judgement, fall risk, decreased skin integrity and decreased cognition  Patient required increased encouragement to participate with increased pain  Poor tolerance of willingness to sit edge of bed secondary to increased back pain  Patient reports requiring assistance for all mobility including transfers to wheelchair and assistance to mobilize the wheelchair at facility  Per case management note obtained after therapy evaluation plan is to return back to facility with assistance for all mobility and ADLs ext  The following objective measures performed on IE also reveal limitations: The patient's AM-PAC Basic Mobility Inpatient Short Form Low Function Raw Score 11 , Standardized Score is 16 55  A standardized score less 42 9 suggests the patient may benefit from discharge to post-acute rehab services  Please also refer to the recommendation of the Physical Therapist for safe discharge planning  Pt's clinical presentation is currently unstable/unpredictable seen in pt's presentation of pain   From PT/mobility standpoint, recommendation at time of d/c would be return to facility with increased assistance pending progress in order to facilitate return to PLOF  PT will sign off at this time with plan to return to facility with assistance all functional mobility   Barriers to Discharge Inaccessible home environment;Decreased caregiver support   Goals   Patient Goals To lay in bed   Plan   Treatment/Interventions OT; Bed mobility;Gait training;Patient/family training; Endurance training;Functional transfer training;LE strengthening/ROM   Recommendation   PT Discharge Recommendation   (back to facility with increased A)   PT - OK to Discharge Yes   AM-PAC Basic Mobility Inpatient   Turning in Bed Without Bedrails 1   Lying on Back to Sitting on Edge of Flat Bed 1   Moving Bed to Chair 1   Standing Up From Chair 1   Walk in Room 1   Climb 3-5 Stairs 1   Basic Mobility Inpatient Raw Score 6   Turning Head Towards Sound 3   Follow Simple Instructions 2   Low Function Basic Mobility Raw Score 11   Low Function Basic Mobility Standardized Score 16 55       Portions of the documentation may have been created using voice recognition software  Occasional wrong word or sound alike substitutions may have occurred due to the inherent limitations of the voice recognition software  Read the chart carefully and recognize, using context, where substitutions have occurred            Salvador Jean, PT

## 2021-06-28 NOTE — PROGRESS NOTES
INTERNAL MEDICINE RESIDENCY PROGRESS NOTE     Name: Filipe Zuniga   Age & Sex: 80 y o  male   MRN: [id number]  Unit/Bed#: [facility] 320-01   Encounter: [id number]  Team: SOD Team B     PATIENT INFORMATION     Name: Filipe Zuniga   Age & Sex: 80 y o  male   MRN: [id number]  Hospital Stay Days: 2    ASSESSMENT/PLAN     Principal Problem:    Right second toe ulcer ([facility] )  Active Problems:    Dementia without behavioral disturbance ([facility] )    History of coronary artery disease    Peripheral vascular disease in diabetes mellitus ([facility] )    BPH (benign prostatic hyperplasia)    Type 2 diabetes mellitus, with long-term current use of insulin ([facility])    Diastolic heart failure ([facility] )    Hyperlipidemia    Hypertension    Anemia    Hearing difficulty of both ears    Actinic dermatitis    Constipation      * Right second toe ulcer ([facility] )  Assessment & Plan  Patient presents with 2nd right toe, dorsal surface, ulcer, see image under media on admission  In context of type 2 diabetes on insulin and patient is wheelchair-bound, with known history of peripheral vascular disease, old CAD and CVA  Leukocytosis on admission 11 5, now 9 30  CRP of 18 4, ESR 59 on admission  Patient is afebrile, no tachycardia no tachypnea, blood pressure WNL  6/26 X ray foot - 2nd mid phalanx, 2nd distal phalanx osteomyelitis suspected    Plan  · Podiatry following, appreciate recommendations  Will discuss surgical intervention with family as he is unable to make his own decisions  · Diabetic management, continue insulin   · Continue home aspirin/Plavix  · Continue IV Flagyl and Ancef per Podiatry recommendations  · Daily CBC  · Monitor vitals  · Wound care  · P r n  Tylenol      Constipation  Assessment & Plan  Unknown last bowel movement, has not had BM since admission   Firm mass palpable in LLQ on exam     Plan  · Miralax 17 g daily   · Senokot daily  · Dulcolax 10 mg suppository PRN  · Consider KUB and enema if constipation does not resolve    Actinic dermatitis  Assessment & Plan  Attending admitted noted on admission including scald and different areas mainly bilateral groin and right lower extremity    Plan  Clotrimazole cream  Wound care consult    Hearing difficulty of both ears  Assessment & Plan  Hearing difficulty noted on admission most likely presbycusis  Anemia  Assessment & Plan  History of anemia, hemoglobin baseline 11-13, 11 4 on admission, now 10 0, no evidence of bleeding, MCV 95    Plan  · Continue home ferrous sulfate 325 ; will give every other day instead of daily while inpatient  · Bowel regimen daily Colace and MiraLax p r n  · May need MMA, folate, iron panel as outpatient  · Continue to monitor daily CBC      Hypertension  Assessment & Plan  History of hypertension, blood pressure well controlled on admission 130s over 60s    Plan  · Continue home Lopressor 25 b i d   · Continue Bumex 0 5 mg daily - diastolic heart failure  · Monitor vitals    Hyperlipidemia  Assessment & Plan  Most recent lipid panel 6/27 Chol 127, TG 88, HDL 54, LDL 55  home medication Lipitor 10 mg daily, history of CAD, and peripheral vascular disease  Plan  · Continue on home Lipitor 10 mg daily for now  · Continue home aspirin Plavix    Diastolic heart failure (HCC)  Assessment & Plan  Wt Readings from Last 3 Encounters:   03/10/20 77 1 kg (170 lb)   09/10/19 77 1 kg (170 lb)   10/22/15 79 kg (174 lb 2 1 oz)     History of chronic diastolic heart failure, right lower extremity grade 1 edema on admission, denies shortness of breath, CTAB on admission  Plan  · Continue home Lopressor 25 b i d   · Continue home Bumex 0 5 mg q d    · Continue home aspirin Plavix  · Daily in/ out  · Cardiac diet, Na <2G and <2L fluid     Type 2 diabetes mellitus, with long-term current use of insulin [facility])  Assessment & Plan  Lab Results   Component Value Date    HGBA1C 6 9 (H) 06/26/2021       Recent Labs     06/27/21  1712 06/27/21  2147 06/28/21  0616 06/28/21  1125 POCGLU 146* 165* 140 121       Blood Sugar Average: Last 72 hrs:  (P) 376 [id number]   Relatively controlled H A1c 6 9, home medications include Lantus 12 units at noon, and metformin 500    Plan  · Hold metformin while inpatient  · Continue Lantus 12 unit at noon time   · Diabetic diet  · Monitor glucose and adjust as needed    BPH (benign prostatic hyperplasia)  Assessment & Plan  Known history of large prostate, came with suprapubic Hays in place  Plan  · Continue home Flomax 0 4 mg daily  · Consider holding Myrbetriq given the suprapubic Hays in place    Peripheral vascular disease in diabetes mellitus [facility])  Assessment & Plan  Lab Results   Component Value Date    HGBA1C 6 9 (H) 06/26/2021     S/p L BKA  6/27 Lower limb arterial duplex - Diffuse disease noted throughout the femoropopliteal arteries without  significant focal stenosis  Evidence suggestive of Tibioperoneal occlusive disease  Ankle/Brachial index: Non-compressible  Metatarsal pressure and Great toe pressure unobtainable secondary to attenuated PPG waveform  PVR/ PPG tracings are dampened  Recent Labs     06/27/21  1712 06/27/21  2147 06/28/21  0616 06/28/21  1125   POCGLU 146* 165* 140 121       Blood Sugar Average: Last 72 hrs:  (P) 144 [id number]   History of peripheral vascular disease, old history of coronary artery disease and stroke, status post left BKA, presenting with right 2nd toe ulcer       Plan  · Continue ASA 81 mg daily  · Continue Plavix 75 mg daily  · See 2nd right toe ulcer assessment and plan    History of coronary artery disease  Assessment & Plan  History of coronary artery disease, denies any chest pain or palpitation on admission    Plan  · Continue aspirin 81 mg daily  · Continue Plavix 7 mg daily  · Continue atorvastatin 10 mg daily    Dementia without behavioral disturbance (HCC)  Assessment & Plan  History of dementia without behavioral disturbance, old history of stroke, AO x1 to self, baseline and stable  Disposition: Continue current level of care  Awaiting podiatry recommendations after discussion with family regarding possible surgical intervention  SUBJECTIVE     Patient seen and examined  No acute events overnight  This morning, Jilda Nageotte had no complaints  He is a poor historian as he is only oriented to self  He endorses constipation although he is unable to say when his last bowel movement was  He denies any fever/chills, HA, chest pain, SOB, pain, N/V     OBJECTIVE     Vitals:    21 2357 21 0600 21 0700 21 1104   BP: 115/54  109/60 123/58   BP Location: Left arm  Left arm Right arm   Pulse: 95  88 92   Resp: 18  16    Temp: 98 1 °F (36 7 °C)  (!) 97 3 °F (36 3 °C)    TempSrc: Axillary  Oral    SpO2: 100%  96%    Weight:  72 6 kg (160 lb 0 9 oz)     Height:          Temperature:   Temp (24hrs), Av 1 °F (36 7 °C), Min:97 3 °F (36 3 °C), Max:98 8 °F (37 1 °C)    Temperature: (!) 97 3 °F (36 3 °C)  Intake & Output:  I/O        07 -  0700 701 -  07 -  0700    P  O   180 0    Total Intake(mL/kg)  180 (2 5) 0 (0)    Urine (mL/kg/hr) 750 1000 (0 6)     Total Output 750 1000     Net -750 -820 0               Weights:   IBW (Ideal Body Weight): 61 5 kg    Body mass index is 26 63 kg/m²  Weight (last 2 days)     Date/Time   Weight    21 0600   72 6 (160 05)    213   79 4 (175 05)    21   79 4 (175 05)            Physical Exam  Vitals reviewed  Cardiovascular:      Rate and Rhythm: Normal rate and regular rhythm  Pulses:           Dorsalis pedis pulses are 1+ on the right side  Left dorsalis pedis pulse not accessible  Posterior tibial pulses are 0 on the right side  Left posterior tibial pulse not accessible  Heart sounds: Normal heart sounds  Pulmonary:      Effort: Pulmonary effort is normal       Breath sounds: Normal breath sounds  Abdominal:      General: Abdomen is flat   Bowel sounds are decreased  Tenderness: There is no abdominal tenderness  Comments: Firm mass palpated in LLQ   Musculoskeletal:      Left Lower Extremity: Left leg is amputated below knee  Feet:      Comments: Ulcer on 2nd R digit, dressing in place  Diminished sensation to light touch throughout R foot  Skin:     Capillary Refill: Capillary refill takes less than 2 seconds  Neurological:      Mental Status: He is alert  Comments: Oriented only to self       LABORATORY DATA     Labs: I have personally reviewed pertinent reports  Results from last 7 days   Lab Units 06/28/21  0438 06/26/21  1917   WBC Thousand/uL 9 30 11 54*   HEMOGLOBIN g/dL 10 0* 11 4*   HEMATOCRIT % 31 3* 35 2*   PLATELETS Thousands/uL 305 349   NEUTROS PCT % 75 72   MONOS PCT % 6 5      Results from last 7 days   Lab Units 06/28/21  0438 06/26/21  1916   POTASSIUM mmol/L 3 7 4 1   CHLORIDE mmol/L 105 102   CO2 mmol/L 27 30   BUN mg/dL 15 13   CREATININE mg/dL 1 06 1 06   CALCIUM mg/dL 8 8 9 5                  Results from last 7 days   Lab Units 06/26/21  1917   LACTIC ACID mmol/L 1 6           IMAGING & DIAGNOSTIC TESTING     Radiology Results: I have personally reviewed pertinent reports  XR foot 3+ views RIGHT    Result Date: 6/27/2021  Impression: 2nd mid phalanx, 2nd distal phalanx osteomyelitis suspected  The study was marked in [facility] for immediate notification  Workstation performed: [id number]     Other Diagnostic Testing: I have personally reviewed pertinent reports      ACTIVE MEDICATIONS     Current Facility-Administered Medications   Medication Dose Route Frequency    acetaminophen (TYLENOL) tablet 488 mg  488 mg Oral Q6H PRN    aspirin (ECOTRIN LOW STRENGTH) EC tablet 81 mg  81 mg Oral Daily    atorvastatin (LIPITOR) tablet 10 mg  10 mg Oral Daily With Dinner    bisacodyl (DULCOLAX) rectal suppository 10 mg  10 mg Rectal Daily PRN    bumetanide (BUMEX) tablet 0 5 mg  0 5 mg Oral Daily    ceFAZolin (ANCEF) IVPB (premix in dextrose) 2,000 mg 50 mL  2,000 mg Intravenous Q8H    cholecalciferol (VITAMIN D3) tablet 1,000 Units  1,000 Units Oral Daily    clopidogrel (PLAVIX) tablet 75 mg  75 mg Oral Daily    clotrimazole (LOTRIMIN) 1 % cream   Topical BID    enoxaparin (LOVENOX) subcutaneous injection 40 mg  40 mg Subcutaneous Daily    ferrous sulfate tablet 325 mg  325 mg Oral Daily With Breakfast    fluticasone (FLONASE) 50 mcg/act nasal spray 1 spray  1 spray Each Nare Daily    insulin glargine (LANTUS) subcutaneous injection 12 Units 0 12 mL  12 Units Subcutaneous Daily    insulin lispro (HumaLOG) 100 units/mL subcutaneous injection 1-5 Units  1-5 Units Subcutaneous 4x Daily (AC & HS)    melatonin tablet 3 mg  3 mg Oral HS    metoprolol tartrate (LOPRESSOR) tablet 25 mg  25 mg Oral Q12H AGGIE    metroNIDAZOLE (FLAGYL) IVPB (premix) 500 mg 100 mL  500 mg Intravenous Q8H    pantoprazole (PROTONIX) EC tablet 40 mg  40 mg Oral BID    polyethylene glycol (MIRALAX) packet 17 g  17 g Oral Daily    senna-docusate sodium (SENOKOT S) 8 6-50 mg per tablet 1 tablet  1 tablet Oral HS    tamsulosin (FLOMAX) capsule 0 4 mg  0 4 mg Oral Daily With Dinner       VTE Pharmacologic Prophylaxis: Enoxaparin (Lovenox)  VTE Mechanical Prophylaxis: sequential compression device    Portions of the record may have been created with voice recognition software  Occasional wrong word or "sound a like" substitutions may have occurred due to the inherent limitations of voice recognition software    Read the chart carefully and recognize, using context, where substitutions have occurred   ==  [facility]  MS4

## 2021-06-28 NOTE — ASSESSMENT & PLAN NOTE
Unknown last bowel movement, patient reported 1 bowel movement yesterday however nothing charted    · Miralax 17 g daily   · Senokot daily  · Dulcolax 10 mg suppository PRN

## 2021-06-28 NOTE — CASE MANAGEMENT
Los 2 days  30 day readmit: no  Readmission risk 13 low  Bundle No    Met with the patient in his room, 320, and explained the role of the  and case management  Pt is very [place] and has history of dementia, with admitting diagnosis of right second toe ulceration  Patient has previous Left BKA with prosthesis, diabetes mellitus type 2, CAD,PVD,BPH, heart failure, HLD, HTN, and anemia  Pt lives at [facility] and is max assist with ambulation and ADL's and is very forgetful and has impaired cognition  There is no alcohol or drug abuse history  No documented history of depression or anxiety but pt has OCD as per lee ann palacios at [place]  Pt has a [address] , niece, who is primary caregiver and contact      Pt PCP is Dr Cielo Le  Pt will require transport back to the nursing home upon DC  Confirmed all information with Davy Lim at [facility] senior living [phone]  CM reviewed d/c planning process including the following: identifying help at home, patient preference for d/c planning needs, Discharge Lounge, [facility] Meds to Bed program, availability of treatment team to discuss questions or concerns patient and/or family may have regarding understanding medications and recognizing signs and symptoms once discharged  CM also encouraged patient to follow up with all recommended appointments after discharge  Patient advised of importance for patient and family to participate in managing patients medical well being

## 2021-06-29 LAB
ANION GAP SERPL CALCULATED.3IONS-SCNC: 6 MMOL/L (ref 4–13)
BACTERIA UR QL AUTO: ABNORMAL /HPF
BASOPHILS # BLD AUTO: 0.04 THOUSANDS/ΜL (ref 0–0.1)
BASOPHILS NFR BLD AUTO: 0 % (ref 0–1)
BILIRUB UR QL STRIP: ABNORMAL
BUN SERPL-MCNC: 13 MG/DL (ref 5–25)
CALCIUM SERPL-MCNC: 8.8 MG/DL (ref 8.3–10.1)
CHLORIDE SERPL-SCNC: 106 MMOL/L (ref 100–108)
CLARITY UR: ABNORMAL
CO2 SERPL-SCNC: 28 MMOL/L (ref 21–32)
COLOR UR: ABNORMAL
CREAT SERPL-MCNC: 0.91 MG/DL (ref 0.6–1.3)
EOSINOPHIL # BLD AUTO: 0.31 THOUSAND/ΜL (ref 0–0.61)
EOSINOPHIL NFR BLD AUTO: 3 % (ref 0–6)
ERYTHROCYTE [DISTWIDTH] IN BLOOD BY AUTOMATED COUNT: 12.4 % (ref 11.6–15.1)
GFR SERPL CREATININE-BSD FRML MDRD: 73 ML/MIN/1.73SQ M
GLUCOSE SERPL-MCNC: 103 MG/DL (ref 65–140)
GLUCOSE SERPL-MCNC: 104 MG/DL (ref 65–140)
GLUCOSE SERPL-MCNC: 111 MG/DL (ref 65–140)
GLUCOSE SERPL-MCNC: 156 MG/DL (ref 65–140)
GLUCOSE SERPL-MCNC: 94 MG/DL (ref 65–140)
GLUCOSE UR STRIP-MCNC: ABNORMAL MG/DL
HCT VFR BLD AUTO: 36.8 % (ref 36.5–49.3)
HGB BLD-MCNC: 12 G/DL (ref 12–17)
HGB UR QL STRIP.AUTO: ABNORMAL
IMM GRANULOCYTES # BLD AUTO: 0.08 THOUSAND/UL (ref 0–0.2)
IMM GRANULOCYTES NFR BLD AUTO: 1 % (ref 0–2)
KETONES UR STRIP-MCNC: ABNORMAL MG/DL
LEUKOCYTE ESTERASE UR QL STRIP: ABNORMAL
LYMPHOCYTES # BLD AUTO: 1.33 THOUSANDS/ΜL (ref 0.6–4.47)
LYMPHOCYTES NFR BLD AUTO: 15 % (ref 14–44)
MCH RBC QN AUTO: 30.9 PG (ref 26.8–34.3)
MCHC RBC AUTO-ENTMCNC: 32.6 G/DL (ref 31.4–37.4)
MCV RBC AUTO: 95 FL (ref 82–98)
MONOCYTES # BLD AUTO: 0.53 THOUSAND/ΜL (ref 0.17–1.22)
MONOCYTES NFR BLD AUTO: 6 % (ref 4–12)
NEUTROPHILS # BLD AUTO: 6.81 THOUSANDS/ΜL (ref 1.85–7.62)
NEUTS SEG NFR BLD AUTO: 75 % (ref 43–75)
NITRITE UR QL STRIP: ABNORMAL
NON-SQ EPI CELLS URNS QL MICRO: ABNORMAL /HPF
NRBC BLD AUTO-RTO: 0 /100 WBCS
PH UR STRIP.AUTO: ABNORMAL [PH]
PLATELET # BLD AUTO: 311 THOUSANDS/UL (ref 149–390)
PMV BLD AUTO: 9.6 FL (ref 8.9–12.7)
POTASSIUM SERPL-SCNC: 3.8 MMOL/L (ref 3.5–5.3)
PROT UR STRIP-MCNC: ABNORMAL MG/DL
RBC # BLD AUTO: 3.88 MILLION/UL (ref 3.88–5.62)
RBC #/AREA URNS AUTO: ABNORMAL /HPF
SODIUM SERPL-SCNC: 140 MMOL/L (ref 136–145)
SP GR UR STRIP.AUTO: 1.01 (ref 1–1.03)
UROBILINOGEN UR QL STRIP.AUTO: ABNORMAL E.U./DL
WBC # BLD AUTO: 9.1 THOUSAND/UL (ref 4.31–10.16)
WBC #/AREA URNS AUTO: ABNORMAL /HPF

## 2021-06-29 PROCEDURE — 85025 COMPLETE CBC W/AUTO DIFF WBC: CPT | Performed by: STUDENT IN AN ORGANIZED HEALTH CARE EDUCATION/TRAINING PROGRAM

## 2021-06-29 PROCEDURE — 81001 URINALYSIS AUTO W/SCOPE: CPT | Performed by: STUDENT IN AN ORGANIZED HEALTH CARE EDUCATION/TRAINING PROGRAM

## 2021-06-29 PROCEDURE — 80048 BASIC METABOLIC PNL TOTAL CA: CPT | Performed by: STUDENT IN AN ORGANIZED HEALTH CARE EDUCATION/TRAINING PROGRAM

## 2021-06-29 PROCEDURE — 99232 SBSQ HOSP IP/OBS MODERATE 35: CPT | Performed by: PODIATRIST

## 2021-06-29 PROCEDURE — 82948 REAGENT STRIP/BLOOD GLUCOSE: CPT

## 2021-06-29 PROCEDURE — 99222 1ST HOSP IP/OBS MODERATE 55: CPT | Performed by: PHYSICIAN ASSISTANT

## 2021-06-29 RX ORDER — SACCHAROMYCES BOULARDII 250 MG
250 CAPSULE ORAL 2 TIMES DAILY
Status: DISCONTINUED | OUTPATIENT
Start: 2021-06-29 | End: 2021-07-13 | Stop reason: HOSPADM

## 2021-06-29 RX ORDER — INSULIN GLARGINE 100 [IU]/ML
10 INJECTION, SOLUTION SUBCUTANEOUS DAILY
Status: DISCONTINUED | OUTPATIENT
Start: 2021-06-29 | End: 2021-06-30

## 2021-06-29 RX ADMIN — CEFAZOLIN SODIUM 2000 MG: 2 SOLUTION INTRAVENOUS at 04:26

## 2021-06-29 RX ADMIN — METRONIDAZOLE 500 MG: 500 INJECTION, SOLUTION INTRAVENOUS at 13:02

## 2021-06-29 RX ADMIN — Medication 1000 UNITS: at 08:02

## 2021-06-29 RX ADMIN — INSULIN GLARGINE 10 UNITS: 100 INJECTION, SOLUTION SUBCUTANEOUS at 08:07

## 2021-06-29 RX ADMIN — Medication 250 MG: at 17:10

## 2021-06-29 RX ADMIN — PANTOPRAZOLE SODIUM 40 MG: 40 TABLET, DELAYED RELEASE ORAL at 08:02

## 2021-06-29 RX ADMIN — INSULIN LISPRO 1 UNITS: 100 INJECTION, SOLUTION INTRAVENOUS; SUBCUTANEOUS at 11:55

## 2021-06-29 RX ADMIN — TAMSULOSIN HYDROCHLORIDE 0.4 MG: 0.4 CAPSULE ORAL at 15:33

## 2021-06-29 RX ADMIN — METRONIDAZOLE 500 MG: 500 INJECTION, SOLUTION INTRAVENOUS at 05:25

## 2021-06-29 RX ADMIN — METRONIDAZOLE 500 MG: 500 INJECTION, SOLUTION INTRAVENOUS at 21:07

## 2021-06-29 RX ADMIN — BUMETANIDE 0.5 MG: 0.5 TABLET ORAL at 08:09

## 2021-06-29 RX ADMIN — CLOTRIMAZOLE: 0.01 CREAM TOPICAL at 17:09

## 2021-06-29 RX ADMIN — DOCUSATE SODIUM AND SENNOSIDES 2 TABLET: 8.6; 5 TABLET ORAL at 21:10

## 2021-06-29 RX ADMIN — ATORVASTATIN CALCIUM 10 MG: 20 TABLET, FILM COATED ORAL at 15:33

## 2021-06-29 RX ADMIN — PANTOPRAZOLE SODIUM 40 MG: 40 TABLET, DELAYED RELEASE ORAL at 17:11

## 2021-06-29 RX ADMIN — BISACODYL 10 MG: 10 SUPPOSITORY RECTAL at 08:14

## 2021-06-29 RX ADMIN — ASPIRIN 81 MG: 81 TABLET, COATED ORAL at 08:01

## 2021-06-29 RX ADMIN — METOPROLOL TARTRATE 25 MG: 25 TABLET, FILM COATED ORAL at 08:02

## 2021-06-29 RX ADMIN — CEFAZOLIN SODIUM 2000 MG: 2 SOLUTION INTRAVENOUS at 11:59

## 2021-06-29 RX ADMIN — CEFAZOLIN SODIUM 2000 MG: 2 SOLUTION INTRAVENOUS at 20:15

## 2021-06-29 RX ADMIN — CLOPIDOGREL BISULFATE 75 MG: 75 TABLET ORAL at 08:02

## 2021-06-29 RX ADMIN — ENOXAPARIN SODIUM 40 MG: 40 INJECTION SUBCUTANEOUS at 08:13

## 2021-06-29 RX ADMIN — MELATONIN TAB 3 MG 3 MG: 3 TAB at 21:10

## 2021-06-29 RX ADMIN — FLUTICASONE PROPIONATE 1 SPRAY: 50 SPRAY, METERED NASAL at 08:28

## 2021-06-29 RX ADMIN — Medication 250 MG: at 12:00

## 2021-06-29 RX ADMIN — POLYETHYLENE GLYCOL 3350 17 G: 17 POWDER, FOR SOLUTION ORAL at 08:13

## 2021-06-29 RX ADMIN — FERROUS SULFATE TAB 325 MG (65 MG ELEMENTAL FE) 325 MG: 325 (65 FE) TAB at 08:01

## 2021-06-29 NOTE — PROGRESS NOTES
Podiatry - Progress Note  Patient: Tino Brenner 80 y o  male   MRN: [id number]  PCP: Siria Goldstein MD  Unit/Bed#: [facility] 320-01 Encounter: [id number]  Date Of Visit: 21    ASSESSMENT:    Tino Brenner is a 80 y o  male with:    1  R 2nd digit ulceration probing to bone - [place] 3 - POA  2  T2DM  3  Dementia    PLAN:    · Reached out to patient's niece, and Manny Thomason via telephone  After considering all of the available options for treatment of the patient's right 2nd digit osteomyelitis, Tara Mccauley has decided against surgical intervention and would like to pursue antibiotics for treatment  I explained to her all risks, complications, and alternative treatments  · Infectious disease team consult appreciated  · X-ray reviewed: Osteomyelitis of the right 2nd digit suspected  · Continue Ancef, flagyl per primary team    · Dressings left clean, dry, and intact at bedside today  · Elevation on green foam wedges or pillows when non-ambulatory  · Rest of care per primary team      Weightbearing status: WBAT    SUBJECTIVE:     The patient was seen, evaluated, and assessed at bedside today  The patient was awake, alert, and in no acute distress  No acute events overnight  The patient reports no pain to his right 2nd digit at this time  Patient denies N/V/F/chills/SOB/CP  OBJECTIVE:     Vitals:   /72 (BP Location: Right arm)   Pulse 100   Temp 97 6 °F (36 4 °C) (Oral)   Resp 14   Ht 5' 5" (1 651 m)   Wt 72 4 kg (159 lb 9 8 oz)   SpO2 98%   BMI 26 56 kg/m²     Temp (24hrs), Av 7 °F (36 5 °C), Min:97 4 °F (36 3 °C), Max:98 °F (36 7 °C)      Physical Exam:     General: Alert, cooperative and no distress  Lungs: Non labored breathing  Abdomen: Soft, non-tender  Lower extremity exam:  Cardiovascular status at baseline  Neurological status at baseline  Musculoskeletal status at baseline  No calf tenderness noted   Dressings to RLE clean, dry, and intact with no strike    Additional Data: Labs:    Results from last 7 days   Lab Units 06/29/21  0544   WBC Thousand/uL 9 10   HEMOGLOBIN g/dL 12 0   HEMATOCRIT % 36 8   PLATELETS Thousands/uL 311   NEUTROS PCT % 75   LYMPHS PCT % 15   MONOS PCT % 6   EOS PCT % 3     Results from last 7 days   Lab Units 06/29/21  0544   POTASSIUM mmol/L 3 8   CHLORIDE mmol/L 106   CO2 mmol/L 28   BUN mg/dL 13   CREATININE mg/dL 0 91   CALCIUM mg/dL 8 8           * I Have Reviewed All Lab Data Listed Above  Recent Cultures (last 7 days):               Imaging: I have personally reviewed pertinent films in PACS  EKG, Pathology, and Other Studies: I have personally reviewed pertinent reports  ** Please Note: Portions of the record may have been created with voice recognition software  Occasional wrong word or "sound a like" substitutions may have occurred due to the inherent limitations of voice recognition software  Read the chart carefully and recognize, using context, where substitutions have occurred   **

## 2021-06-29 NOTE — SPEECH THERAPY NOTE
Per RN patient tolerating meds and current diet without difficulty however is refusing any po at this time  Discussed with SLIM- will cancel orders  Consider new consult if new concerns arise      SAILAJA Sterling S , [address] [facility]  Speech Language Pathologist   Available via [address] [place] [address] #[id number]  [place] #[id number]

## 2021-06-29 NOTE — PROGRESS NOTES
INTERNAL MEDICINE RESIDENCY PROGRESS NOTE     Name: Lizzy Stein   Age & Sex: 80 y o  male   MRN: [id number]  Unit/Bed#: [facility] 320-01   Encounter: [id number]  Team: SOD Team B     PATIENT INFORMATION     Name: Lizzy Stein   Age & Sex: 80 y o  male   MRN: [id number]  Hospital Stay Days: 3    ASSESSMENT/PLAN     Principal Problem:    Right second toe ulcer ([facility] )  Active Problems:    Dementia without behavioral disturbance ([facility] )    History of coronary artery disease    Peripheral vascular disease in diabetes mellitus ([facility] )    BPH (benign prostatic hyperplasia)    Type 2 diabetes mellitus, with long-term current use of insulin ([facility])    Diastolic heart failure ([facility] )    Hyperlipidemia    Hypertension    Anemia    Hearing difficulty of both ears    Actinic dermatitis    Constipation      * Right second toe ulcer ([facility] )  Assessment & Plan  Patient presents with 2nd right toe, dorsal surface, ulcer, see image under media on admission  In context of type 2 diabetes on insulin and patient is wheelchair-bound, with known history of peripheral vascular disease, old CAD and CVA  Leukocytosis on admission 11 5, now 9 30  CRP of 18 4, ESR 59 on admission  Patient is afebrile, no tachycardia no tachypnea, blood pressure WNL  6/26 X ray foot - 2nd mid phalanx, 2nd distal phalanx osteomyelitis suspected    Plan   · Podiatry following, appreciate recommendations  SOLIS (Danna Mclean) leaning toward non-surgical treatment, would like to discuss with palliative before making final decision  · Diabetic management, continue insulin   · Continue home aspirin/Plavix  · Continue IV Flagyl and Ancef per Podiatry recommendations  · Daily CBC  · Monitor vitals  · Wound care  · P r n  Tylenol      Constipation  Assessment & Plan  Unknown last bowel movement, has not had BM since admission   Firm mass palpable in LLQ on exam     Plan  · Start Miralax 17 g daily   · Start Senokot daily  · Start Dulcolax 10 mg suppository PRN  · Consider KUB and enema if constipation does not resolve    Actinic dermatitis  Assessment & Plan  Attending admitted noted on admission including scald and different areas mainly bilateral groin and right lower extremity    Plan  Clotrimazole cream  Wound care consult    Hearing difficulty of both ears  Assessment & Plan  Hearing difficulty noted on admission most likely presbycusis  Anemia  Assessment & Plan  History of anemia, hemoglobin baseline 11-13, 11 4 on admission, now 12 0, no evidence of bleeding, MCV 95    Plan  · Continue home ferrous sulfate 325 ; will give every other day instead of daily while inpatient  · Bowel regimen daily (see constipation)  · May need MMA, folate, iron panel as outpatient  · Continue to monitor daily CBC      Hypertension  Assessment & Plan  History of hypertension, blood pressure well controlled on admission 130s over 60s    Plan  · Continue home Lopressor 25 b i d   · Continue Bumex 0 5 mg daily - diastolic heart failure  · Monitor vitals    Hyperlipidemia  Assessment & Plan  Most recent lipid panel 6/27 Chol 127, TG 88, HDL 54, LDL 55  home medication Lipitor 10 mg daily, history of CAD, and peripheral vascular disease  Plan  · Continue on home Lipitor 10 mg daily for now  · Continue home aspirin Plavix    Diastolic heart failure (HCC)  Assessment & Plan  Wt Readings from Last 3 Encounters:   03/10/20 77 1 kg (170 lb)   09/10/19 77 1 kg (170 lb)   10/22/15 79 kg (174 lb 2 1 oz)     History of chronic diastolic heart failure, right lower extremity grade 1 edema on admission, denies shortness of breath, CTAB on admission  Plan  · Continue home Lopressor 25 b i d   · Continue home Bumex 0 5 mg q d    · Continue home aspirin/Plavix  · Daily in/out  · Cardiac diet, Na <2G and <2L fluid     Type 2 diabetes mellitus, with long-term current use of insulin [facility])  Assessment & Plan  Lab Results   Component Value Date    HGBA1C 6 9 (H) 06/26/2021       Recent Labs     06/28/21  1651 06/28/21 2047 06/29/21  0755 06/29/21  1107   POCGLU 110 102 104 156*       Blood Sugar Average: Last 72 hrs:  (P) 020 [id number]   Relatively controlled H A1c 6 9  Home medications include Lantus 12 units at noon, and metformin 500    Plan  · Hold metformin while inpatient  · Lantus decreased from 12 to 10 units at noon time in setting of decreased oral intake and sugars in low 100's   · Diabetic diet  · Monitor glucose and adjust as needed    BPH (benign prostatic hyperplasia)  Assessment & Plan  Known history of large prostate, came with suprapubic Hays in place  Plan  · Continue home Flomax 0 4 mg daily  · Consider holding Myrbetriq given the suprapubic Hays in place    Peripheral vascular disease in diabetes mellitus [facility])  Assessment & Plan  Lab Results   Component Value Date    HGBA1C 6 9 (H) 06/26/2021     S/p L BKA  6/27 Lower limb arterial duplex - Diffuse disease noted throughout the femoropopliteal arteries without  significant focal stenosis  Evidence suggestive of Tibioperoneal occlusive disease  Ankle/Brachial index: Non-compressible  Metatarsal pressure and Great toe pressure unobtainable secondary to attenuated PPG waveform  PVR/ PPG tracings are dampened  Recent Labs     06/28/21  1651 06/28/21 2047 06/29/21  0755 06/29/21  1107   POCGLU 110 102 104 156*       Blood Sugar Average: Last 72 hrs:  (P) 982 [id number]   History of peripheral vascular disease, old history of coronary artery disease and stroke, status post left BKA, presenting with right 2nd toe ulcer       Plan  · Continue ASA 81 mg daily  · Continue Plavix 75 mg daily  · See 2nd right toe ulcer assessment and plan    History of coronary artery disease  Assessment & Plan  History of coronary artery disease, denies any chest pain or palpitation on admission    Plan  · Continue aspirin 81 mg daily  · Continue Plavix 7 mg daily  · Continue atorvastatin 10 mg daily    Dementia without behavioral disturbance [facility])  Assessment & Plan  History of dementia without behavioral disturbance, old history of stroke, AO x1 to self, baseline and stable  His niece, Patricio Sarmiento, is POA  Contact at [phone]     Will obtain UA for tea colored/dark red urine output from suprapubic catheter  Disposition: Will continue same level of care  Pending palliative care consult, speech and swallow eval, and podiatry recommendation for antibiotic choice  SUBJECTIVE     Patient seen and examined  No acute events overnight  Cathi Sadler mentioned to one of the nurses that he was having difficulty swallowing food and was not eating his meals  He was also refusing his evening senokot  This morning, Emeli Ndiaye has no complaints  He continues to be only oriented to self and is a poor historian  He was able to eat breakfast (regular diet) this morning and received dulcolax suppository  He denies any HA, SOB, chest pain, N/V, pain in his foot  OBJECTIVE     Vitals:    21 1616 21 0017 21 0600 21 0700   BP: 127/59 127/62  138/72   BP Location: Right arm Right arm  Right arm   Pulse: 91 94  100   Resp: 16 18  14   Temp: (!) 97 4 °F (36 3 °C) 98 °F (36 7 °C)  97 6 °F (36 4 °C)   TempSrc: Oral Oral  Oral   SpO2: 97% 96%  98%   Weight:   72 4 kg (159 lb 9 8 oz)    Height:          Temperature:   Temp (24hrs), Av 7 °F (36 5 °C), Min:97 4 °F (36 3 °C), Max:98 °F (36 7 °C)    Temperature: 97 6 °F (36 4 °C)  Intake & Output:  I/O       701 -  0700 701 -  07 -  0700    P  O  180 180     I V  (mL/kg)  80 (1 1)     IV Piggyback  150     Total Intake(mL/kg) 180 (2 5) 410 (5 7)     Urine (mL/kg/hr) 1000 (0 6) 1450 (0 8)     Total Output 1000 1450     Net -820 -1040                Weights:   IBW (Ideal Body Weight): 61 5 kg    Body mass index is 26 56 kg/m²    Weight (last 2 days)     Date/Time   Weight    21 0600   72 4 (159 61)    21 0600   72 6 (160 05)            Physical Exam  Constitutional:       Appearance: He is well-developed  Cardiovascular:      Rate and Rhythm: Normal rate and regular rhythm  Pulses:           Dorsalis pedis pulses are 1+ on the right side  Left dorsalis pedis pulse not accessible  Posterior tibial pulses are 0 on the right side  Left posterior tibial pulse not accessible  Heart sounds: Normal heart sounds  Pulmonary:      Effort: Pulmonary effort is normal       Breath sounds: Normal breath sounds  Abdominal:      General: Bowel sounds are normal       Palpations: Abdomen is soft  Comments: Palpable mass in LLQ   Genitourinary:     Comments: Suprapubic catheter in place  Non-erythematous, dry, clean  Draining tea colored/dark red urine  Musculoskeletal:      Comments: Left BKA      Left Lower Extremity: Left leg is amputated below ankle  Feet:      Comments: Ulcer 2nd digit or right toe  Dressing in place  Neurological:      Mental Status: He is alert  Comments: Oriented only to self  Decreased sensation throughout right foot  No sensation to fine touch on 1st digit  LABORATORY DATA     Labs: I have personally reviewed pertinent reports  Results from last 7 days   Lab Units 06/29/21  0544 06/28/21  0438 06/26/21  1917   WBC Thousand/uL 9 10 9 30 11 54*   HEMOGLOBIN g/dL 12 0 10 0* 11 4*   HEMATOCRIT % 36 8 31 3* 35 2*   PLATELETS Thousands/uL 311 305 349   NEUTROS PCT % 75 75 72   MONOS PCT % 6 6 5      Results from last 7 days   Lab Units 06/29/21  0544 06/28/21  0438 06/26/21  1916   POTASSIUM mmol/L 3 8 3 7 4 1   CHLORIDE mmol/L 106 105 102   CO2 mmol/L 28 27 30   BUN mg/dL 13 15 13   CREATININE mg/dL 0 91 1 06 1 06   CALCIUM mg/dL 8 8 8 8 9 5                  Results from last 7 days   Lab Units 06/26/21  1917   LACTIC ACID mmol/L 1 6           IMAGING & DIAGNOSTIC TESTING     Radiology Results: I have personally reviewed pertinent reports    XR foot 3+ views RIGHT    Result Date: 6/27/2021  Impression: 2nd mid phalanx, 2nd distal phalanx osteomyelitis suspected  The study was marked in [facility] for immediate notification  Workstation performed: [id number]     Other Diagnostic Testing: I have personally reviewed pertinent reports      ACTIVE MEDICATIONS     Current Facility-Administered Medications   Medication Dose Route Frequency    acetaminophen (TYLENOL) tablet 488 mg  488 mg Oral Q6H PRN    aspirin (ECOTRIN LOW STRENGTH) EC tablet 81 mg  81 mg Oral Daily    atorvastatin (LIPITOR) tablet 10 mg  10 mg Oral Daily With Dinner    bisacodyl (DULCOLAX) rectal suppository 10 mg  10 mg Rectal Daily PRN    bumetanide (BUMEX) tablet 0 5 mg  0 5 mg Oral Daily    ceFAZolin (ANCEF) IVPB (premix in dextrose) 2,000 mg 50 mL  2,000 mg Intravenous Q8H    cholecalciferol (VITAMIN D3) tablet 1,000 Units  1,000 Units Oral Daily    clopidogrel (PLAVIX) tablet 75 mg  75 mg Oral Daily    clotrimazole (LOTRIMIN) 1 % cream   Topical BID    enoxaparin (LOVENOX) subcutaneous injection 40 mg  40 mg Subcutaneous Daily    ferrous sulfate tablet 325 mg  325 mg Oral Daily With Breakfast    fluticasone (FLONASE) 50 mcg/act nasal spray 1 spray  1 spray Each Nare Daily    insulin glargine (LANTUS) subcutaneous injection 10 Units 0 1 mL  10 Units Subcutaneous Daily    insulin lispro (HumaLOG) 100 units/mL subcutaneous injection 1-5 Units  1-5 Units Subcutaneous 4x Daily (AC & HS)    melatonin tablet 3 mg  3 mg Oral HS    metoprolol tartrate (LOPRESSOR) tablet 25 mg  25 mg Oral Q12H AGGIE    metroNIDAZOLE (FLAGYL) IVPB (premix) 500 mg 100 mL  500 mg Intravenous Q8H    pantoprazole (PROTONIX) EC tablet 40 mg  40 mg Oral BID    polyethylene glycol (MIRALAX) packet 17 g  17 g Oral Daily    saccharomyces boulardii (FLORASTOR) capsule 250 mg  250 mg Oral BID    senna-docusate sodium (SENOKOT S) 8 6-50 mg per tablet 2 tablet  2 tablet Oral HS    tamsulosin (FLOMAX) capsule 0 4 mg  0 4 mg Oral Daily With Dinner       VTE Pharmacologic Prophylaxis: Enoxaparin (Lovenox)  VTE Mechanical Prophylaxis: sequential compression device    Portions of the record may have been created with voice recognition software  Occasional wrong word or "sound a like" substitutions may have occurred due to the inherent limitations of voice recognition software    Read the chart carefully and recognize, using context, where substitutions have occurred   ==  [address] [facility]  MS4

## 2021-06-29 NOTE — CONSULTS
Consultation - Palliative and Supportive Care   Antonio Cardenas 80 y o  male [id number]    Active issues specifically addressed today include:   · Dementia  · Right second toe ulcer  · PVD  · Goals of care discussion  · Palliative care encounter    Plan:  1  Symptom management -    - Delirium precautions: please minimize interruptions and prioritize sleep at night  No TV nor screen time at night  Shades drawn at night  During day, shades up, minimize napping, and encourage meals in chair    - No other needs at this time    2  Goals - level 3 DNR   - Ongoing medical management with intent to pursue ABX for right second toe ulcer as POA/carlo feels that surgical intervention is likely too aggressive  However, she is planning to consider over the next 24H  -Strict limits of DNR/DNI   - Patient will likely require placement in SNF as he came from [facility] and needs higher level of care  - SOLIS Stewart) is well versed with palliative care and hospice, she is not opposed to hospice in the future after completion of ABX if indicated  Code Status: DNR - Level 3   Decisional apparatus:  Patient is not competent on my exam today  If competence is lost, patient's substitute decision maker would default to Berto matos by [place]  Advance Directive / Living Will / POLST:  POA document received via email, to be scanned to file  Details that Berto Maharaj is POJASWINDER  Decisions made as above are consistent with documented wishes  3  Social support   - Patient is well supported by Berto matos, who is also his POA   - Patient's nephew/Rubina's spouse unfortunately  about 1 week ago   - No medical information or updates to nephew, Johnny Pedroza, if he is at bedside  I have reviewed the patient's controlled substance dispensing history in the Prescription Drug Monitoring Program in compliance with the [facility] regulations before prescribing any controlled substances  We appreciate the invitation to be involved in this patient's care  We will continue to follow  Please do not hesitate to reach our on call provider through our clinic answering service at  should you have acute symptom control concerns  [facility] PA-C  Palliative and Supportive Care  Clinic/Answering Service: [phone]  You can find me on [employer]! IDENTIFICATION:  Consults  Physician Requesting Consult: Ayla Dobbs MD  Reason for Consult / Principal Problem: goals of care  Hx and PE limited by: dementia, supplemented by conversation with POA, primary team, RN, chart review    HISTORY OF PRESENT ILLNESS:       Cindy Crockett is a 80 y o  male with dementia, PAD, CAD, hx of CVA, DM2, CHF, [facility] on  with ulcer on right second toe  Prior to admission patient was residing at assisted living facility  Patient recently completed course of ABX prior to admission  Podiatry was consulted and recommended amputation vs IV abx  Palliative care has been consulted to assist with goals of care  Prior to admission patient was residing at an [facility]  He is supported primarily by his niece/POA Carly Carroll was  to his nephew Lobito Espinal who was co-POA  Unfortunately Sara Stahl  about 1 week ago  Carly admits to feeling overwhelmed, but it is important to her that "Pammisae Rushing" is well taken care of  POA paperwork has been emailed and confirmed  Carly reports having experience with HPM through her 's illness  She is very appreciative of hospice and palliative and wishes to assure the patient's quality of life is prioritized  For the time being she wishes for ABX to be continued as he is tolerating them well, but is leaning towards avoiding surgical intervention with fear it will "set him back"  She is open to hospice in the future if he has further deterioration       Review of Systems   Unable to perform ROS: dementia   Constitutional:        Looking forward to lunch   Musculoskeletal:        Denies pain       Past Medical History: Diagnosis Date    Anemia     Dementia [facility] )     Diabetes mellitus ([facility] )     Diastolic heart failure (HCC)     Hyperlipidemia     Hypertension     Osteoarthritis     PVD (peripheral vascular disease) (HCC)      Past Surgical History:   Procedure Laterality Date    AMPUTATION Left     left bka     Social History     Socioeconomic History    Marital status:      Spouse name: Not on file    Number of children: Not on file    Years of education: Not on file    Highest education level: Not on file   Occupational History    Not on file   Tobacco Use    Smoking status: Never Smoker    Smokeless tobacco: Never Used   Vaping Use    Vaping Use: Never used   Substance and Sexual Activity    Alcohol use: No    Drug use: Never    Sexual activity: Not on file   Other Topics Concern    Not on file   Social History Narrative    Not on file     Social Determinants of Health     Financial Resource Strain:     Difficulty of Paying Living Expenses:    Food Insecurity:     Worried About Running Out of Food in the Last Year:     [address] in the Last Year:    Transportation Needs:     Lack of Transportation (Medical):  Lack of Transportation (Non-Medical):    Physical Activity:     Days of Exercise per Week:     Minutes of Exercise per Session:    Stress:     Feeling of Stress :    Social Connections:     Frequency of Communication with Friends and Family:     Frequency of Social Gatherings with Friends and Family:     Attends Rastafari Services:     Active Member of Clubs or Organizations:     Attends Club or Organization Meetings:     Marital Status:    Intimate Partner Violence:     Fear of Current or Ex-Partner:     Emotionally Abused:     Physically Abused:     Sexually Abused:      History reviewed  No pertinent family history      MEDICATIONS / ALLERGIES:    all current active meds have been reviewed    No Known Allergies    OBJECTIVE:    Physical Exam  Physical Exam  HENT: Head: Normocephalic and atraumatic  Eyes:      Conjunctiva/sclera: Conjunctivae normal    Cardiovascular:      Rate and Rhythm: Normal rate  Pulmonary:      Effort: Pulmonary effort is normal  No respiratory distress  Abdominal:      General: There is no distension  Tenderness: There is no guarding  Genitourinary:     Comments: Suprapubic catheter with dark urine  Musculoskeletal:         General: No swelling  Skin:     General: Skin is warm and dry  Comments: Ulcer on right second toe   Neurological:      Mental Status: He is alert  Comments: Pleasantly confused   Psychiatric:         Behavior: Behavior normal          Lab Results:   I have personally reviewed pertinent labs  , CBC:   Lab Results   Component Value Date    WBC 9 10 06/29/2021    HGB 12 0 06/29/2021    HCT 36 8 06/29/2021    MCV 95 06/29/2021     06/29/2021    MCH 30 9 06/29/2021    MCHC 32 6 06/29/2021    RDW 12 4 06/29/2021    MPV 9 6 06/29/2021    NRBC 0 06/29/2021   , CMP:   Lab Results   Component Value Date    SODIUM 140 06/29/2021    K 3 8 06/29/2021     06/29/2021    CO2 28 06/29/2021    BUN 13 06/29/2021    CREATININE 0 91 06/29/2021    CALCIUM 8 8 06/29/2021    EGFR 73 06/29/2021     Imaging Studies: reviewed pertinent studies  EKG, Pathology, and Other Studies: reviewed pertinent studies    Counseling / Coordination of Care    Total floor / unit time spent today 45+ minutes  Greater than 50% of total time was spent with the patient and / or family counseling and / or coordination of care  A description of the counseling / coordination of care: time spent assessing patient, communicating with POA to discuss [address], coordinating care with primary team and RN

## 2021-06-29 NOTE — CASE MANAGEMENT
Pt is recommended for short-term skilled rehab placement for his aftercare plan  CM spoke to pt's SOLIS Pagan about this recommendation  POA is agreeable w/ recommendation  CM provided POA w/ freedom of choice for SNF providers  POA requested referral to [facility] SNF  CM made Ecin referral to [facility] SNF  CM to follow

## 2021-06-30 PROBLEM — R82.998 DARK URINE: Status: ACTIVE | Noted: 2021-06-30

## 2021-06-30 LAB
ANION GAP SERPL CALCULATED.3IONS-SCNC: 6 MMOL/L (ref 4–13)
BASOPHILS # BLD AUTO: 0.03 THOUSANDS/ΜL (ref 0–0.1)
BASOPHILS NFR BLD AUTO: 0 % (ref 0–1)
BUN SERPL-MCNC: 12 MG/DL (ref 5–25)
CALCIUM SERPL-MCNC: 8.7 MG/DL (ref 8.3–10.1)
CHLORIDE SERPL-SCNC: 105 MMOL/L (ref 100–108)
CO2 SERPL-SCNC: 26 MMOL/L (ref 21–32)
CREAT SERPL-MCNC: 0.88 MG/DL (ref 0.6–1.3)
EOSINOPHIL # BLD AUTO: 0.26 THOUSAND/ΜL (ref 0–0.61)
EOSINOPHIL NFR BLD AUTO: 3 % (ref 0–6)
ERYTHROCYTE [DISTWIDTH] IN BLOOD BY AUTOMATED COUNT: 12.5 % (ref 11.6–15.1)
GFR SERPL CREATININE-BSD FRML MDRD: 75 ML/MIN/1.73SQ M
GLUCOSE SERPL-MCNC: 106 MG/DL (ref 65–140)
GLUCOSE SERPL-MCNC: 109 MG/DL (ref 65–140)
GLUCOSE SERPL-MCNC: 71 MG/DL (ref 65–140)
GLUCOSE SERPL-MCNC: 88 MG/DL (ref 65–140)
GLUCOSE SERPL-MCNC: 98 MG/DL (ref 65–140)
HCT VFR BLD AUTO: 33.1 % (ref 36.5–49.3)
HGB BLD-MCNC: 10.8 G/DL (ref 12–17)
IMM GRANULOCYTES # BLD AUTO: 0.04 THOUSAND/UL (ref 0–0.2)
IMM GRANULOCYTES NFR BLD AUTO: 1 % (ref 0–2)
LYMPHOCYTES # BLD AUTO: 1.29 THOUSANDS/ΜL (ref 0.6–4.47)
LYMPHOCYTES NFR BLD AUTO: 15 % (ref 14–44)
MCH RBC QN AUTO: 31.1 PG (ref 26.8–34.3)
MCHC RBC AUTO-ENTMCNC: 32.6 G/DL (ref 31.4–37.4)
MCV RBC AUTO: 95 FL (ref 82–98)
MONOCYTES # BLD AUTO: 0.54 THOUSAND/ΜL (ref 0.17–1.22)
MONOCYTES NFR BLD AUTO: 6 % (ref 4–12)
NEUTROPHILS # BLD AUTO: 6.3 THOUSANDS/ΜL (ref 1.85–7.62)
NEUTS SEG NFR BLD AUTO: 75 % (ref 43–75)
NRBC BLD AUTO-RTO: 0 /100 WBCS
PLATELET # BLD AUTO: 337 THOUSANDS/UL (ref 149–390)
PMV BLD AUTO: 9.7 FL (ref 8.9–12.7)
POTASSIUM SERPL-SCNC: 3.5 MMOL/L (ref 3.5–5.3)
RBC # BLD AUTO: 3.47 MILLION/UL (ref 3.88–5.62)
SODIUM SERPL-SCNC: 137 MMOL/L (ref 136–145)
WBC # BLD AUTO: 8.46 THOUSAND/UL (ref 4.31–10.16)

## 2021-06-30 PROCEDURE — 85025 COMPLETE CBC W/AUTO DIFF WBC: CPT | Performed by: STUDENT IN AN ORGANIZED HEALTH CARE EDUCATION/TRAINING PROGRAM

## 2021-06-30 PROCEDURE — 99223 1ST HOSP IP/OBS HIGH 75: CPT | Performed by: INTERNAL MEDICINE

## 2021-06-30 PROCEDURE — 82948 REAGENT STRIP/BLOOD GLUCOSE: CPT

## 2021-06-30 PROCEDURE — 80048 BASIC METABOLIC PNL TOTAL CA: CPT | Performed by: STUDENT IN AN ORGANIZED HEALTH CARE EDUCATION/TRAINING PROGRAM

## 2021-06-30 RX ORDER — INSULIN GLARGINE 100 [IU]/ML
6 INJECTION, SOLUTION SUBCUTANEOUS DAILY
Status: DISCONTINUED | OUTPATIENT
Start: 2021-06-30 | End: 2021-07-13 | Stop reason: HOSPADM

## 2021-06-30 RX ORDER — SODIUM CHLORIDE, SODIUM GLUCONATE, SODIUM ACETATE, POTASSIUM CHLORIDE, MAGNESIUM CHLORIDE, SODIUM PHOSPHATE, DIBASIC, AND POTASSIUM PHOSPHATE .53; .5; .37; .037; .03; .012; .00082 G/100ML; G/100ML; G/100ML; G/100ML; G/100ML; G/100ML; G/100ML
50 INJECTION, SOLUTION INTRAVENOUS CONTINUOUS
Status: DISPENSED | OUTPATIENT
Start: 2021-06-30 | End: 2021-06-30

## 2021-06-30 RX ADMIN — Medication 250 MG: at 17:20

## 2021-06-30 RX ADMIN — DOCUSATE SODIUM AND SENNOSIDES 2 TABLET: 8.6; 5 TABLET ORAL at 22:00

## 2021-06-30 RX ADMIN — BUMETANIDE 0.5 MG: 0.5 TABLET ORAL at 08:08

## 2021-06-30 RX ADMIN — FLUTICASONE PROPIONATE 1 SPRAY: 50 SPRAY, METERED NASAL at 08:08

## 2021-06-30 RX ADMIN — Medication 250 MG: at 08:08

## 2021-06-30 RX ADMIN — POLYETHYLENE GLYCOL 3350 17 G: 17 POWDER, FOR SOLUTION ORAL at 08:14

## 2021-06-30 RX ADMIN — CLOTRIMAZOLE: 0.01 CREAM TOPICAL at 17:20

## 2021-06-30 RX ADMIN — FERROUS SULFATE TAB 325 MG (65 MG ELEMENTAL FE) 325 MG: 325 (65 FE) TAB at 08:14

## 2021-06-30 RX ADMIN — ASPIRIN 81 MG: 81 TABLET, COATED ORAL at 08:14

## 2021-06-30 RX ADMIN — CEFAZOLIN SODIUM 2000 MG: 2 SOLUTION INTRAVENOUS at 04:14

## 2021-06-30 RX ADMIN — INSULIN GLARGINE 6 UNITS: 100 INJECTION, SOLUTION SUBCUTANEOUS at 08:20

## 2021-06-30 RX ADMIN — ATORVASTATIN CALCIUM 10 MG: 20 TABLET, FILM COATED ORAL at 17:19

## 2021-06-30 RX ADMIN — TAMSULOSIN HYDROCHLORIDE 0.4 MG: 0.4 CAPSULE ORAL at 17:19

## 2021-06-30 RX ADMIN — MELATONIN TAB 3 MG 3 MG: 3 TAB at 22:00

## 2021-06-30 RX ADMIN — METRONIDAZOLE 500 MG: 500 INJECTION, SOLUTION INTRAVENOUS at 05:06

## 2021-06-30 RX ADMIN — PANTOPRAZOLE SODIUM 40 MG: 40 TABLET, DELAYED RELEASE ORAL at 08:14

## 2021-06-30 RX ADMIN — ENOXAPARIN SODIUM 40 MG: 40 INJECTION SUBCUTANEOUS at 08:20

## 2021-06-30 RX ADMIN — CLOTRIMAZOLE: 0.01 CREAM TOPICAL at 08:08

## 2021-06-30 RX ADMIN — METOPROLOL TARTRATE 25 MG: 25 TABLET, FILM COATED ORAL at 08:07

## 2021-06-30 RX ADMIN — Medication 1000 UNITS: at 08:14

## 2021-06-30 RX ADMIN — SODIUM CHLORIDE, SODIUM GLUCONATE, SODIUM ACETATE, POTASSIUM CHLORIDE, MAGNESIUM CHLORIDE, SODIUM PHOSPHATE, DIBASIC, AND POTASSIUM PHOSPHATE 50 ML/HR: .53; .5; .37; .037; .03; .012; .00082 INJECTION, SOLUTION INTRAVENOUS at 09:26

## 2021-06-30 RX ADMIN — PANTOPRAZOLE SODIUM 40 MG: 40 TABLET, DELAYED RELEASE ORAL at 17:20

## 2021-06-30 RX ADMIN — CLOPIDOGREL BISULFATE 75 MG: 75 TABLET ORAL at 08:14

## 2021-06-30 NOTE — PROGRESS NOTES
INTERNAL MEDICINE RESIDENCY PROGRESS NOTE     Name: Mandy Suazo   Age & Sex: 80 y o  male   MRN: [id number]  Unit/Bed#: [facility] 320-01   Encounter: [id number]  Team: SOD Team B     PATIENT INFORMATION     Name: Mandy Suazo   Age & Sex: 80 y o  male   MRN: [id number]  Hospital Stay Days: 4    ASSESSMENT/PLAN     Principal Problem:    Right second toe ulcer ([facility] )  Active Problems:    Peripheral vascular disease in diabetes mellitus ([place] [address] )    Type 2 diabetes mellitus, with long-term current use of insulin ([place] [address] )    Dementia without behavioral disturbance ([place] [address] )    History of coronary artery disease    BPH (benign prostatic hyperplasia)    Diastolic heart failure (HCC)    Hyperlipidemia    Hypertension    Anemia    Hearing difficulty of both ears    Actinic dermatitis    Constipation    Dark urine      * Right second toe ulcer ([facility] )  Assessment & Plan  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 confirmed by foot x-ray  Leukocytosis on admission 11 5, now 8 46  CRP of 18 4, ESR 59 on admission  Patient is afebrile, no tachycardia no tachypnea, blood pressure WNL  · Podiatry following, appreciate recommendations  Patient and SOLIS Lr) leaning toward non-surgical treatment  · ID consulted, recommending holding antibiotic therapy for 48-72 hours and biopsy of the right 2nd toe ulcer to help guide and optimized therapy  · Daily CBC  · Monitor vitals  · Wound care  · P r n   Tylenol      Type 2 diabetes mellitus, with long-term current use of insulin [facility])  Assessment & Plan  Lab Results   Component Value Date    HGBA1C 6 9 (H) 06/26/2021       Recent Labs     06/29/21  1107 06/29/21  1620 06/29/21  2115 06/30/21  0611   POCGLU 156* 103 111 88       Blood Sugar Average: Last 72 hrs:  (P) 123 [id number]   Relatively controlled H A1c 6 9  Home medications include Lantus 12 units at noon, and metformin 500  · Hold metformin while inpatient  · Decreased Lantus to 6 units in setting of decreased oral intake and sugars in 's  · Monitor glucose and adjust as needed    Peripheral vascular disease in diabetes mellitus [facility])  Assessment & Plan  S/p L BKA  6/27 Lower limb arterial duplex - Diffuse disease noted throughout the femoropopliteal arteries without  significant focal stenosis  Evidence suggestive of Tibioperoneal occlusive disease  Ankle/Brachial index: Non-compressible  Metatarsal pressure and Great toe pressure unobtainable secondary to attenuated PPG waveform  PVR/ PPG tracings are dampened  · Continue ASA 81 mg daily  · Continue Plavix 75 mg daily  · See 2nd right toe ulcer assessment and plan    Dark urine  Assessment & Plan  Noted in suprapubic cathter bag  UA unreliable due to interfering substance  Will hydrate today and can consider repeat tomorrow  Constipation  Assessment & Plan  Unknown last bowel movement, patient reported 1 bowel movement yesterday however nothing charted  · Miralax 17 g daily   · Senokot daily  · Dulcolax 10 mg suppository PRN      Actinic dermatitis  Assessment & Plan  Attending admitted noted on admission including scald and different areas mainly bilateral groin and right lower extremity  · Clotrimazole cream  · Wound care consult    Hearing difficulty of both ears  Assessment & Plan  Hearing difficulty noted on admission most likely presbycusis         Anemia  Assessment & Plan  History of anemia, hemoglobin baseline 11-13, hemoglobin stable  · Continue home ferrous sulfate 325 ; will give every other day instead of daily while inpatient  · Bowel regimen daily  · May need MMA, folate, iron panel as outpatient  · Continue to monitor daily CBC      Hypertension  Assessment & Plan  History of hypertension, blood pressure well controlled on admission 130s over 60s  · Continue home Lopressor 25 b i d   · Continue Bumex 0 5 mg daily - diastolic heart failure  · Monitor vitals    Hyperlipidemia  Assessment & Plan  Most recent lipid panel 6/27 Chol 127, TG 88, HDL 54, LDL 55  home medication Lipitor 10 mg daily, history of CAD, and peripheral vascular disease  · Continue on home Lipitor 10 mg daily for now  · Continue home aspirin Plavix    Diastolic heart failure (HCC)  Assessment & Plan  Wt Readings from Last 3 Encounters:   06/30/21 73 kg (160 lb 15 oz)   03/10/20 77 1 kg (170 lb)   09/10/19 77 1 kg (170 lb)     History of chronic diastolic heart failure, right lower extremity grade 1 edema on admission, denies shortness of breath, CTAB on admission  Plan  · Continue home Lopressor 25 b i d   · Continue home Bumex 0 5 mg q d  · Daily in/out  · Cardiac diet, Na <2G and <2L fluid     BPH (benign prostatic hyperplasia)  Assessment & Plan  Known history of large prostate, came with suprapubic Hays in place  · Continue home Flomax 0 4 mg daily  · Consider holding Myrbetriq given the suprapubic Hays in place    History of coronary artery disease  Assessment & Plan  History of coronary artery disease, denies any chest pain or palpitation on admission    Plan  · Continue aspirin 81 mg daily  · Continue Plavix 7 mg daily  · Continue atorvastatin 10 mg daily    Dementia without behavioral disturbance (HCC)  Assessment & Plan  History of dementia without behavioral disturbance, old history of stroke, AO x1 to self, baseline and stable  His niece, Imelda Wesley, is POA  Contact at [phone]       Disposition:  Continue current level of care    SUBJECTIVE     Patient seen and examined  No acute events overnight  Patient found sitting in bed, aid at bedside  Had no acute complaints    Denies pain, fevers, chills, N/V      OBJECTIVE     Vitals:    06/29/21 2300 06/30/21 0600 06/30/21 0807 06/30/21 0808   BP: 105/51  143/67 133/63   BP Location: Right arm  Left arm Left arm   Pulse: 96   98   Resp: 19   20   Temp: 97 7 °F (36 5 °C)   97 5 °F (36 4 °C)   TempSrc: Oral   Oral   SpO2: 99%   95%   Weight:  73 kg (160 lb 15 oz)     Height:          Temperature:   Temp (24hrs), Av 7 °F (36 5 °C), Min:97 5 °F (36 4 °C), Max:98 °F (36 7 °C)    Temperature: 97 5 °F (36 4 °C)  Intake & Output:  I/O       701 -  07 -  07 07 -  0700    P  O  180      I V  (mL/kg) 80 (1 1)      IV Piggyback 150      Total Intake(mL/kg) 410 (5 7)      Urine (mL/kg/hr) 1450 (0 8)      Total Output 1450      Net -1040                 Weights:   IBW (Ideal Body Weight): 61 5 kg    Body mass index is 26 78 kg/m²  Weight (last 2 days)     Date/Time   Weight    21 06   73 (160 94)    21 06   72 4 (159 61)    21 06   72 6 (160 05)            Physical Exam  Vitals and nursing note reviewed  Constitutional:       Appearance: He is well-developed  HENT:      Head: Normocephalic and atraumatic  Eyes:      Conjunctiva/sclera: Conjunctivae normal    Cardiovascular:      Rate and Rhythm: Normal rate and regular rhythm  Heart sounds: No murmur heard  Pulmonary:      Effort: Pulmonary effort is normal  No respiratory distress  Breath sounds: Normal breath sounds  Abdominal:      Palpations: Abdomen is soft  Tenderness: There is no abdominal tenderness  Genitourinary:     Comments: Suprapubic catheter in place  Musculoskeletal:      Cervical back: Neck supple  Comments: Left BKA  Right 2nd toe ulcer, dressing in place   Skin:     General: Skin is warm and dry  Neurological:      Mental Status: He is alert  Comments: A&O x1       LABORATORY DATA     Labs: I have personally reviewed pertinent reports    Results from last 7 days   Lab Units 21  0438 21  0544 21  0438   WBC Thousand/uL 8 46 9 10 9 30   HEMOGLOBIN g/dL 10 8* 12 0 10 0*   HEMATOCRIT % 33 1* 36 8 31 3*   PLATELETS Thousands/uL 337 311 305   NEUTROS PCT % 75 75 75   MONOS PCT % 6 6 6      Results from last 7 days   Lab Units 21  0437 21  0544 21  0438   POTASSIUM mmol/L 3 5 3 8 3 7   CHLORIDE mmol/L 105 106 105   CO2 mmol/L 26 28 27   BUN mg/dL 12 13 15   CREATININE mg/dL 0 88 0 91 1 06   CALCIUM mg/dL 8 7 8 8 8 8                  Results from last 7 days   Lab Units 06/26/21  1917   LACTIC ACID mmol/L 1 6           IMAGING & DIAGNOSTIC TESTING     Radiology Results: I have personally reviewed pertinent reports  XR foot 3+ views RIGHT    Result Date: 6/27/2021  Impression: 2nd mid phalanx, 2nd distal phalanx osteomyelitis suspected  The study was marked in [facility] for immediate notification  Workstation performed: [id number]     Other Diagnostic Testing: I have personally reviewed pertinent reports      ACTIVE MEDICATIONS     Current Facility-Administered Medications   Medication Dose Route Frequency    acetaminophen (TYLENOL) tablet 488 mg  488 mg Oral Q6H PRN    aspirin (ECOTRIN LOW STRENGTH) EC tablet 81 mg  81 mg Oral Daily    atorvastatin (LIPITOR) tablet 10 mg  10 mg Oral Daily With Dinner    bisacodyl (DULCOLAX) rectal suppository 10 mg  10 mg Rectal Daily PRN    bumetanide (BUMEX) tablet 0 5 mg  0 5 mg Oral Daily    cholecalciferol (VITAMIN D3) tablet 1,000 Units  1,000 Units Oral Daily    clopidogrel (PLAVIX) tablet 75 mg  75 mg Oral Daily    clotrimazole (LOTRIMIN) 1 % cream   Topical BID    enoxaparin (LOVENOX) subcutaneous injection 40 mg  40 mg Subcutaneous Daily    ferrous sulfate tablet 325 mg  325 mg Oral Daily With Breakfast    fluticasone (FLONASE) 50 mcg/act nasal spray 1 spray  1 spray Each Nare Daily    insulin glargine (LANTUS) subcutaneous injection 6 Units 0 06 mL  6 Units Subcutaneous Daily    insulin lispro (HumaLOG) 100 units/mL subcutaneous injection 1-5 Units  1-5 Units Subcutaneous 4x Daily (AC & HS)    melatonin tablet 3 mg  3 mg Oral HS    metoprolol tartrate (LOPRESSOR) tablet 25 mg  25 mg Oral Q12H AGGIE    multi-electrolyte (PLASMALYTE-A/ISOLYTE-S PH 7 4) IV solution  50 mL/hr Intravenous Continuous    pantoprazole (PROTONIX) EC tablet 40 mg  40 mg Oral BID    polyethylene glycol (MIRALAX) packet 17 g  17 g Oral Daily    saccharomyces boulardii (FLORASTOR) capsule 250 mg  250 mg Oral BID    senna-docusate sodium (SENOKOT S) 8 6-50 mg per tablet 2 tablet  2 tablet Oral HS    tamsulosin (FLOMAX) capsule 0 4 mg  0 4 mg Oral Daily With Dinner       VTE Pharmacologic Prophylaxis: Enoxaparin (Lovenox)  VTE Mechanical Prophylaxis: sequential compression device    Portions of the record may have been created with voice recognition software  Occasional wrong word or "sound a like" substitutions may have occurred due to the inherent limitations of voice recognition software    Read the chart carefully and recognize, using context, where substitutions have occurred   ==  Henrik Lenz MD  [address]  Internal Medicine Residency PGY-2

## 2021-06-30 NOTE — ASSESSMENT & PLAN NOTE
Noted in suprapubic cathter bag  UA unreliable due to interfering substance  Received IV hydration  Today urine appeared lighter   Continue to monitor vitals

## 2021-06-30 NOTE — QUICK NOTE
Spoke with Trish Pandya, who confirms she wishes to continue on course of conservative management and defer surgical treatment of toe ulcer  Colleen Allen expresses understanding that this may not be sufficient for definitive treatment and remains open to hospice as an option should his condition worsen  She is agreeable to ID recommendations of re-biopsy to further direct antibiotic course  Colleen Allen has palliative care contact and will contact with questions or concerns

## 2021-06-30 NOTE — CONSULTS
Consultation - Infectious Disease   Sotero Mccurdy 80 y o  male MRN: [id number]  Unit/Bed#: [facility] 320-01 Encounter: [id number]    Extensive review of the medical records in Epic including review of the notes, radiographs, and laboratory results  IMPRESSION/RECOMMENDATIONS:    RT 2nd toe diabetic ulcer with osteomyelitis confirmed by right foot x-ray  Patient is currently afebrile, without leukocytosis  Nonseptic in appearance  Elevated ESR 59, Crp 18 are noted  Patient and his POA Daria Stevens are both declining surgical intervention including amputation right 2nd toe  They would prefer to treat conservatively with antibiotic therapy   o Suggest holding antibiotic therapy at present for at least 48-72 hours  o Biopsy of right 2nd toe ulcer advised  Please send tissue for routine and anerobic culture to help guide and optimize antibiotic therapy  o I explained to the patient that antibiotic therapy alone is a suboptimal treatment approach for osteomyelitis  Without surgical intervention, no guarantees for cure of infection were provided to the patient   o Local wound care per Podiatry service  o Monitor clinical response, temperature curve   Leukocytosis: noted at the time of admission, but has since normalized  Lactate is WNL  o Monitor clinical response, temperature curve   Diabetes mellitus type 2 with hyperglycemia: noted HbA1C of 6 9 on 6/26/21   o Glycemic control per primary team   Peripheral arterial disease:  Status post left BKA  Arterial duplex right lower extremity demonstrates tibioperoneal occlusive disease without significant focal stenosis   History of diastolic heart failure   o Management per primary team   Advanced age    My recommendations were discussed with the patient at bedside  My recommendations were discussed with resident Dr Iram Garcia, from the primary service  My recommendations were securely texted to podiatry resident, Dr Wilda Schuler    Thank you for allowing me to participate in the care of this patient  The ID service will follow  HISTORY OF PRESENT ILLNESS:  Reason for Consult: Skin ulcer of second toe of right foot with necrosis of bone  CC: RT 2nd toe pain  HPI: Gil Villalobos is a 80y o  year old male with PMH of DM2, CAD, PVD, diastolic heart failure, LT BKA with prosthetic leg who was admitted on 6/27 from group home for evaluation of a right 2nd toe ulcer  Limited history from patient due to dementia  He reportedly completed a 10 day course of cephalexin 500 mg b i d on June 25th  He was noted to have an ulcer that probes to bone  Pt was initially complaining of RT 2nd toe pain  XR RT foot was concerning for osteomyelitis  Patient was empirically treated with cefazolin and metronidazole  Pt's Gerson Maguire has decided against surgical intervention  ID service was consulted for antibiotic management  Patient at this time offers no complaints other than intermittent right 2nd toe pain  He denies fever or chills  She denies nausea, vomiting, diarrhea  He says his breathing is fine  REVIEW OF SYSTEMS:  Limited due to dementia  Constitutional: Negative for fever, chills  HENT: Negative sore throat, congestion  Respiratory: Negative cough, shortness of breath  Cardiovascular: Negative for chest pain  Gastrointestinal: Negative for abdominal pain, vomiting, diarrhea  Genitourinary:  Negative for hematuria  Musculoskeletal: +RT 2nd toe ulcer and pain  Skin: +RT toe 2nd toe ulcer, wound  Negative for rash  Neurological: Negative for syncope, focal weakness  Hematological: Negative for excessive bruising, bleeding  Psychiatric/Behavioral: +dementia  Negative for hallucination        PAST MEDICAL HISTORY:  Past Medical History:   Diagnosis Date    Anemia     [facility]     Diabetes mellitus ([facility] )     Diastolic heart failure (HCC)     Hyperlipidemia     Hypertension     Osteoarthritis     PVD (peripheral vascular disease) ([facility] )      Past Surgical History: Procedure Laterality Date    AMPUTATION Left     left bka     FAMILY HISTORY:  Non-contributory    SOCIAL HISTORY:  Social History   Social History     Substance and Sexual Activity   Alcohol Use No     Social History     Substance and Sexual Activity   Drug Use Never     Social History     Tobacco Use   Smoking Status Never Smoker   Smokeless Tobacco Never Used     ALLERGIES:  No Known Allergies    MEDICATIONS:  All current active medications have been reviewed    Antibiotics: cefazolin, metronidazole  Scheduled Meds:  Current Facility-Administered Medications   Medication Dose Route Frequency Provider Last Rate    acetaminophen  488 mg Oral Q6H PRN Jhonny Mares MD      aspirin  81 mg Oral Daily Jhonny Mares MD      atorvastatin  10 mg Oral Daily With Leticia Cervantes MD      bisacodyl  10 mg Rectal Daily PRN Diane Melendez DO      bumetanide  0 5 mg Oral Daily Jhonny Mares MD      cefazolin  2,000 mg Intravenous Q8H Eric Dumont, DO 2,000 mg (06/30/21 0414)    cholecalciferol  1,000 Units Oral Daily Jhonny Mares MD      clopidogrel  75 mg Oral Daily Jhonny Mares MD      clotrimazole   Topical BID Eric Dumnot, DO      enoxaparin  40 mg Subcutaneous Daily Jhonny Mares MD      ferrous sulfate  325 mg Oral Daily With Breakfast Jhonny Mares MD      fluticasone  1 spray Each Nare Daily Jhonny Mares MD      insulin glargine  6 Units Subcutaneous Daily Domenica Robles MD      insulin lispro  1-5 Units Subcutaneous 4x Daily (AC & HS) Layton Perez, DO      melatonin  3 mg Oral HS Layton Perez, DO      metoprolol tartrate  25 mg Oral Q12H [facility] Jhonny Mares MD      metroNIDAZOLE  500 mg Intravenous Q8H Ericberkley Dumont,  mg (06/30/21 0506)    multi-electrolyte  50 mL/hr Intravenous Continuous Domenica Robles MD 50 mL/hr (06/30/21 0926)    pantoprazole  40 mg Oral BID Jhonny Mares MD      polyethylene glycol  17 g Oral Daily Layton Perez, DO      saccharomyces boulardii  250 mg Oral BID Cedric Walters DO      senna-docusate sodium  2 tablet Oral HS Cedric Walters DO      tamsulosin  0 4 mg Oral Daily With Blanca Schulte MD       Continuous Infusions:multi-electrolyte, 50 mL/hr, Last Rate: 50 mL/hr (21 0926)      PRN Meds:   acetaminophen    bisacodyl     PHYSICAL EXAM:  Temp:  [97 5 °F (36 4 °C)-98 °F (36 7 °C)] 97 5 °F (36 4 °C)  HR:  [94-98] 98  Resp:  [19-22] 20  BP: (105-143)/(51-67) 133/63  SpO2:  [95 %-99 %] 95 %  Temp (24hrs), Av 7 °F (36 5 °C), Min:97 5 °F (36 4 °C), Max:98 °F (36 7 °C)  Current: Temperature: 97 5 °F (36 4 °C)  No intake or output data in the 24 hours ending 21 1030  General Appearance:  Elderly man, resting in bed, no acute distress   Head:  Normocephalic, without obvious abnormality, atraumatic   Eyes:  EOMI, Conjunctiva pink and sclera anicteric, both eyes   Nose: Nares normal, mucosa normal, no drainage   Throat: Oropharynx moist    Neck: Supple   Lungs:  Clear to auscultation bilaterally, respirations unlabored   Heart:   S1, S2, regular rate,rhythm, no murmur   Abdomen: Soft, non-tender, non-distended   :  Suprapubic catheter present   Extremities:   No distal right leg edema  LT BKA noted   Skin: No rash  RT 2nd toe dressing clean, dry, intact  Wound pic demonstrates distal ulcer on dorsum of RT 2nd toe  RT 2nd toe with hammertoe deformity noted   Neurologic: Pt is hard of hearing  Alert and oriented to person, surroundings, conversant, fluent speech   Psychiatric: Pt is easily irritable, frustrated but cooperative with exam and interview     LABS, IMAGING, & OTHER STUDIES:  Lab Results:  I have personally reviewed pertinent labs and cultures    Results from last 7 days   Lab Units 21  0438 21  0544 21  0438   WBC Thousand/uL 8 46 9 10 9 30   HEMOGLOBIN g/dL 10 8* 12 0 10 0*   PLATELETS Thousands/uL 337 311 305     Results from last 7 days   Lab Units 21  0437 21  0544 21  0438   SODIUM mmol/L 137 140 138 POTASSIUM mmol/L 3 5 3 8 3 7   CHLORIDE mmol/L 105 106 105   CO2 mmol/L 26 28 27   BUN mg/dL 12 13 15   CREATININE mg/dL 0 88 0 91 1 06   EGFR ml/min/1 73sq m 75 73 61   CALCIUM mg/dL 8 7 8 8 8 8     Results from last 7 days   Lab Units 06/26/21  1916   CRP mg/L 18 4*     Imaging Studies:   6/26/21 RT foot XR: 2nd mid phalanx, 2nd distal phalanx osteomyelitis suspected  Image reviewed in PACS  6/27/21 Arterial duplex RLE: Diffuse disease noted throughout the femoropopliteal arteries without  significant focal stenosis  Evidence suggestive of Tibioperoneal occlusive  disease  I have personally reviewed pertinent imaging study reports

## 2021-07-01 LAB
BASOPHILS # BLD AUTO: 0.03 THOUSANDS/ΜL (ref 0–0.1)
BASOPHILS NFR BLD AUTO: 0 % (ref 0–1)
EOSINOPHIL # BLD AUTO: 0.21 THOUSAND/ΜL (ref 0–0.61)
EOSINOPHIL NFR BLD AUTO: 3 % (ref 0–6)
ERYTHROCYTE [DISTWIDTH] IN BLOOD BY AUTOMATED COUNT: 12.5 % (ref 11.6–15.1)
GLUCOSE SERPL-MCNC: 117 MG/DL (ref 65–140)
GLUCOSE SERPL-MCNC: 152 MG/DL (ref 65–140)
GLUCOSE SERPL-MCNC: 161 MG/DL (ref 65–140)
GLUCOSE SERPL-MCNC: 88 MG/DL (ref 65–140)
HCT VFR BLD AUTO: 32.5 % (ref 36.5–49.3)
HGB BLD-MCNC: 10.4 G/DL (ref 12–17)
IMM GRANULOCYTES # BLD AUTO: 0.04 THOUSAND/UL (ref 0–0.2)
IMM GRANULOCYTES NFR BLD AUTO: 1 % (ref 0–2)
LYMPHOCYTES # BLD AUTO: 1.49 THOUSANDS/ΜL (ref 0.6–4.47)
LYMPHOCYTES NFR BLD AUTO: 19 % (ref 14–44)
MCH RBC QN AUTO: 30.5 PG (ref 26.8–34.3)
MCHC RBC AUTO-ENTMCNC: 32 G/DL (ref 31.4–37.4)
MCV RBC AUTO: 95 FL (ref 82–98)
MONOCYTES # BLD AUTO: 0.52 THOUSAND/ΜL (ref 0.17–1.22)
MONOCYTES NFR BLD AUTO: 7 % (ref 4–12)
NEUTROPHILS # BLD AUTO: 5.59 THOUSANDS/ΜL (ref 1.85–7.62)
NEUTS SEG NFR BLD AUTO: 70 % (ref 43–75)
NRBC BLD AUTO-RTO: 0 /100 WBCS
PLATELET # BLD AUTO: 306 THOUSANDS/UL (ref 149–390)
PMV BLD AUTO: 10 FL (ref 8.9–12.7)
RBC # BLD AUTO: 3.41 MILLION/UL (ref 3.88–5.62)
WBC # BLD AUTO: 7.88 THOUSAND/UL (ref 4.31–10.16)

## 2021-07-01 PROCEDURE — 82948 REAGENT STRIP/BLOOD GLUCOSE: CPT

## 2021-07-01 PROCEDURE — 85025 COMPLETE CBC W/AUTO DIFF WBC: CPT | Performed by: STUDENT IN AN ORGANIZED HEALTH CARE EDUCATION/TRAINING PROGRAM

## 2021-07-01 PROCEDURE — 99232 SBSQ HOSP IP/OBS MODERATE 35: CPT | Performed by: INTERNAL MEDICINE

## 2021-07-01 RX ADMIN — MELATONIN TAB 3 MG 3 MG: 3 TAB at 21:14

## 2021-07-01 RX ADMIN — ATORVASTATIN CALCIUM 10 MG: 20 TABLET, FILM COATED ORAL at 15:55

## 2021-07-01 RX ADMIN — ASPIRIN 81 MG: 81 TABLET, COATED ORAL at 08:58

## 2021-07-01 RX ADMIN — FERROUS SULFATE TAB 325 MG (65 MG ELEMENTAL FE) 325 MG: 325 (65 FE) TAB at 08:57

## 2021-07-01 RX ADMIN — PANTOPRAZOLE SODIUM 40 MG: 40 TABLET, DELAYED RELEASE ORAL at 17:17

## 2021-07-01 RX ADMIN — PANTOPRAZOLE SODIUM 40 MG: 40 TABLET, DELAYED RELEASE ORAL at 08:58

## 2021-07-01 RX ADMIN — INSULIN GLARGINE 6 UNITS: 100 INJECTION, SOLUTION SUBCUTANEOUS at 08:59

## 2021-07-01 RX ADMIN — Medication 250 MG: at 17:17

## 2021-07-01 RX ADMIN — POLYETHYLENE GLYCOL 3350 17 G: 17 POWDER, FOR SOLUTION ORAL at 09:00

## 2021-07-01 RX ADMIN — Medication 1000 UNITS: at 08:58

## 2021-07-01 RX ADMIN — ENOXAPARIN SODIUM 40 MG: 40 INJECTION SUBCUTANEOUS at 08:59

## 2021-07-01 RX ADMIN — BUMETANIDE 0.5 MG: 0.5 TABLET ORAL at 09:06

## 2021-07-01 RX ADMIN — METOPROLOL TARTRATE 25 MG: 25 TABLET, FILM COATED ORAL at 08:56

## 2021-07-01 RX ADMIN — INSULIN LISPRO 1 UNITS: 100 INJECTION, SOLUTION INTRAVENOUS; SUBCUTANEOUS at 21:14

## 2021-07-01 RX ADMIN — TAMSULOSIN HYDROCHLORIDE 0.4 MG: 0.4 CAPSULE ORAL at 15:55

## 2021-07-01 RX ADMIN — FLUTICASONE PROPIONATE 1 SPRAY: 50 SPRAY, METERED NASAL at 09:07

## 2021-07-01 RX ADMIN — Medication 250 MG: at 09:06

## 2021-07-01 RX ADMIN — CLOTRIMAZOLE: 0.01 CREAM TOPICAL at 17:17

## 2021-07-01 RX ADMIN — CLOPIDOGREL BISULFATE 75 MG: 75 TABLET ORAL at 08:56

## 2021-07-01 RX ADMIN — INSULIN LISPRO 1 UNITS: 100 INJECTION, SOLUTION INTRAVENOUS; SUBCUTANEOUS at 15:55

## 2021-07-01 NOTE — QUICK NOTE
Saw the Patient today and spoke with POA  Plan to perform tissue cultures tomorrow (7/2)  per ID recommendation  POA was grateful and agreeable with this plan

## 2021-07-01 NOTE — PROGRESS NOTES
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 confirmed by right foot x-ray  Patient is currently afebrile, without leukocytosis  Nonseptic in appearance  Elevated ESR 59, Crp 18 are noted  Patient and his POA Lei Alicia are both declining surgical intervention including amputation right 2nd toe  They would prefer to treat conservatively with antibiotic therapy   o Suggest holding antibiotic therapy at present for at least 48 hours  o Biopsy of right 2nd toe ulcer advised  Please send tissue for routine and anerobic culture to help guide and optimize antibiotic therapy  o I explained to the patient that antibiotic therapy alone is a suboptimal treatment approach for osteomyelitis  Without surgical intervention, no guarantees for cure of infection were provided to the patient   o Local wound care per Podiatry service  o Monitor clinical response, temperature curve off ABx   Leukocytosis: noted at the time of admission, but has since normalized  Lactate is WNL  o Monitor clinical response, temperature curve off ABx   Diabetes mellitus type 2 with hyperglycemia: noted HbA1C of 6 9 on 6/26/21   o Glycemic control per primary team   Peripheral arterial disease:  Status post left BKA  Arterial duplex right lower extremity demonstrates tibioperoneal occlusive disease without significant focal stenosis   History of diastolic heart failure   o Management per primary team   Advanced age    My recommendations were discussed with the patient and his niece at bedside  Their questions/concerns were addressed by me  Thank you for allowing me to participate in the care of this patient  The ID service will follow      Antibiotics: none  Scheduled Meds:  Current Facility-Administered Medications   Medication Dose Route Frequency Provider Last Rate    acetaminophen 488 mg Oral Q6H PRN Conchis Lynch MD      aspirin  81 mg Oral Daily Conchis Lynch MD      atorvastatin  10 mg Oral Daily With Iliana Gibson MD      bisacodyl  10 mg Rectal Daily PRN Sly Camarillo DO      bumetanide  0 5 mg Oral Daily Conchis Lynch MD      cholecalciferol  1,000 Units Oral Daily Conchis Lynch MD      clopidogrel  75 mg Oral Daily Conchis Lynch MD      clotrimazole   Topical BID Eric AiadDO      enoxaparin  40 mg Subcutaneous Daily Conchis Lynch MD      ferrous sulfate  325 mg Oral Daily With Breakfast Conchis Lynch MD      fluticasone  1 spray Each Nare Daily Conchis Lynch MD      insulin glargine  6 Units Subcutaneous Daily Lopez Head MD      insulin lispro  1-5 Units Subcutaneous 4x Daily (AC & HS) Laytontami Perez, DO      melatonin  3 mg Oral HS Laytontami Perez, DO      metoprolol tartrate  25 mg Oral Q12H Karlie Yang MD      pantoprazole  40 mg Oral BID Conchis Lynch MD      polyethylene glycol  17 g Oral Daily Laytontami Perez, DO      saccharomyces boulardii  250 mg Oral BID Sly Camarillo DO      senna-docusate sodium  2 tablet Oral HS Layton Perez,       tamsulosin  0 4 mg Oral Daily With Iliana Gibson MD       Continuous Infusions:   PRN Meds:   acetaminophen    bisacodyl    Subjective:  Patient offers no complaints  He denies fever, chills, vomiting, diarrhea, shortness of breath       Objective:  Vitals:  Temp:  [97 6 °F (36 4 °C)-98 °F (36 7 °C)] 97 6 °F (36 4 °C)  HR:  [91-96] 95  Resp:  [16-22] 16  BP: (104-117)/(53-58) 117/58  SpO2:  [94 %-99 %] 97 %  Temp (24hrs), Av 8 °F (36 6 °C), Min:97 6 °F (36 4 °C), Max:98 °F (36 7 °C)  Current: Temperature: 97 6 °F (36 4 °C)  Physical Exam:   General Appearance:  Elderly man, resting in bed, no acute distress   ENT: Oropharynx moist without lesions   Lungs:   Clear to auscultation bilaterally; respirations unlabored   Heart:  S1, S2, RRR, no murmur   Abdomen:   Soft, non-tender, non-distended   : Suprapubic catheter present   Extremities: No distal leg edema b/l, LT BKA noted   Neurologic: Pt is hard of hearing  AAO to person, surroundings, conversant, fluent speech   Skin: No rash  RT 2nd toe dressing clean, dry, intact     Labs, Cultures, Imaging, & Other studies:   All pertinent labs, cultures and imaging studies were personally reviewed  Results from last 7 days   Lab Units 07/01/21  0531 06/30/21  0438 06/29/21  0544   WBC Thousand/uL 7 88 8 46 9 10   HEMOGLOBIN g/dL 10 4* 10 8* 12 0   PLATELETS Thousands/uL 306 337 311     Results from last 7 days   Lab Units 06/30/21  0437 06/29/21  0544 06/28/21  0438   SODIUM mmol/L 137 140 138   POTASSIUM mmol/L 3 5 3 8 3 7   CHLORIDE mmol/L 105 106 105   CO2 mmol/L 26 28 27   BUN mg/dL 12 13 15   CREATININE mg/dL 0 88 0 91 1 06   EGFR ml/min/1 73sq m 75 73 61   CALCIUM mg/dL 8 7 8 8 8 8     Results from last 7 days   Lab Units 06/26/21  1916   CRP mg/L 18 4*     Imaging Studies:   6/26/21 RT foot XR: 2nd mid phalanx, 2nd distal phalanx osteomyelitis suspected  Image reviewed in PACS      6/27/21 Arterial duplex RLE: Diffuse disease noted throughout the femoropopliteal arteries without  significant focal stenosis  Evidence suggestive of Tibioperoneal occlusive  disease

## 2021-07-01 NOTE — PROGRESS NOTES
INTERNAL MEDICINE RESIDENCY PROGRESS NOTE     PATIENT INFORMATION     Name: Sergio Douglas   Age & Sex: 80 y o  male   MRN: [id number]  Hospital Stay Days: 5  Unit/Bed#: [facility] 320-01   Encounter: [id number]  Team: SOD Team B     ASSESSMENT/PLAN     Principal Problem:    Right second toe ulcer ([facility] )  Active Problems:    Dementia without behavioral disturbance ([place] [address] )    History of coronary artery disease    Peripheral vascular disease in diabetes mellitus ([place] [address] )    BPH (benign prostatic hyperplasia)    Type 2 diabetes mellitus, with long-term current use of insulin ([facility])    Diastolic heart failure ([place] [address] )    Hyperlipidemia    Hypertension    Anemia    Hearing difficulty of both ears    Actinic dermatitis    Constipation    Dark urine    Dark urine  Assessment & Plan  Noted in suprapubic cathter bag  UA unreliable due to interfering substance  Received IV hydration  Today urine appeared lighter and more "tea colored"  Continue to monitor vitals    Constipation  Assessment & Plan  Unknown last bowel movement, patient reported 1 bowel movement yesterday however nothing charted  · Miralax 17 g daily   · Senokot daily  · Dulcolax 10 mg suppository PRN      Actinic dermatitis  Assessment & Plan  Attending admitted noted on admission including scald and different areas mainly bilateral groin and right lower extremity  · Clotrimazole cream  · Wound care consult    Hearing difficulty of both ears  Assessment & Plan  Hearing difficulty noted on admission most likely presbycusis         Anemia  Assessment & Plan  History of anemia, hemoglobin baseline 11-13, hemoglobin stable  · Continue home ferrous sulfate 325 ; will give every other day instead of daily while inpatient  · Bowel regimen daily  · May need MMA, folate, iron panel as outpatient  · Continue to monitor daily CBC      Hypertension  Assessment & Plan  History of hypertension, blood pressure well controlled on admission 130s over 60s  · Continue home Lopressor 25 b i d  · Continue Bumex 0 5 mg daily - diastolic heart failure  · Monitor vitals    Hyperlipidemia  Assessment & Plan  Most recent lipid panel 6/27 Chol 127, TG 88, HDL 54, LDL 55  home medication Lipitor 10 mg daily, history of CAD, and peripheral vascular disease  · Continue on home Lipitor 10 mg daily for now  · Continue home aspirin Plavix    Diastolic heart failure (HCC)  Assessment & Plan  Wt Readings from Last 3 Encounters:   07/01/21 62 1 kg (136 lb 14 5 oz)   03/10/20 77 1 kg (170 lb)   09/10/19 77 1 kg (170 lb)     History of chronic diastolic heart failure, right lower extremity grade 1 edema on admission, denies shortness of breath, CTAB on admission  Plan  · Continue home Lopressor 25 b i d   · Continue home Bumex 0 5 mg q d  · Daily in/out  · Cardiac diet, Na <2G and <2L fluid     Type 2 diabetes mellitus, with long-term current use of insulin [facility])  Assessment & Plan  Lab Results   Component Value Date    HGBA1C 6 9 (H) 06/26/2021       Recent Labs     06/30/21  1634 06/30/21  2046 07/01/21  0556 07/01/21  1107   POCGLU 98 106 88 117       Blood Sugar Average: Last 72 hrs:  (P) 110 2   Relatively controlled H A1c 6 9  Home medications include Lantus 12 units at noon, and metformin 500  · Hold metformin while inpatient  · Decreased Lantus to 6 units in setting of decreased oral intake and sugars in 's  · Monitor glucose and adjust as needed    BPH (benign prostatic hyperplasia)  Assessment & Plan  Known history of large prostate, came with suprapubic Hays in place  · Continue home Flomax 0 4 mg daily  · Consider holding Myrbetriq given the suprapubic Hays in place    Peripheral vascular disease in diabetes mellitus [facility])  Assessment & Plan  S/p L BKA  6/27 Lower limb arterial duplex - Diffuse disease noted throughout the femoropopliteal arteries without  significant focal stenosis  Evidence suggestive of Tibioperoneal occlusive disease     Ankle/Brachial index: Non-compressible  Metatarsal pressure and Great toe pressure unobtainable secondary to attenuated PPG waveform  PVR/ PPG tracings are dampened  · Continue ASA 81 mg daily  · Continue Plavix 75 mg daily  · See 2nd right toe ulcer assessment and plan    History of coronary artery disease  Assessment & Plan  History of coronary artery disease, denies any chest pain or palpitation on admission    Plan  · Continue aspirin 81 mg daily  · Continue Plavix 7 mg daily  · Continue atorvastatin 10 mg daily    Dementia without behavioral disturbance (HCC)  Assessment & Plan  History of dementia without behavioral disturbance, old history of stroke, AO x1 to self, baseline and stable  His niece, Bailey Burgess, is POA  Contact at [phone]     * Right second toe ulcer [facility])  Assessment & Plan  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 confirmed by foot x-ray  Leukocytosis on admission 11 5, now 8 46  CRP of 18 4, ESR 59 on admission  Patient is afebrile, no tachycardia no tachypnea, blood pressure WNL  · Podiatry following, appreciate recommendations  Patient and POJASWINDER Mcgee) leaning toward non-surgical treatment  · ID consulted, recommending holding antibiotic therapy for 48-72 hours and biopsy of the right 2nd toe ulcer to help guide and optimized therapy  · Biopsy/culture of toe will take place tomorrow 7/2 -> Will help guide antibiotic treatment  · Daily CBC  · Monitor vitals  · Wound care  · P r n  Tylenol        Disposition: Inpatient     SUBJECTIVE     Patient seen and examined  No acute events overnight  Today overall the patient is feeling well and had no acute complaints  He continues to have poor hearing      OBJECTIVE     Vitals:    07/01/21 0008 07/01/21 0600 07/01/21 0701 07/01/21 0856   BP: 104/58  117/58 117/58   BP Location: Left arm  Left arm    Pulse: 91  95 95   Resp: 18  16    Temp: 98 °F (36 7 °C)  97 6 °F (36 4 °C)    TempSrc: Oral  Oral    SpO2: 94%  97%    Weight:  62 1 kg (136 lb 14 5 oz)     Height:          Temperature:   Temp (24hrs), Av 8 °F (36 6 °C), Min:97 6 °F (36 4 °C), Max:98 °F (36 7 °C)    Temperature: 97 6 °F (36 4 °C)  Intake & Output:  I/O       701 -  0700  07 -  0700  07 -  0700    P  O   480     I V  (mL/kg)  620 (10)     IV Piggyback       Total Intake(mL/kg)  1100 (17 7)     Urine (mL/kg/hr)  2000 (1 3) 400 (0 9)    Total Output  2000 400    Net  -900 -400                 Intake/Output Summary (Last 24 hours) at 2021 1428  Last data filed at 2021 0701  Gross per 24 hour   Intake 1100 ml   Output 1000 ml   Net 100 ml     I/O this shift:  In: -   Out: 400 [Urine:400]  Weights:   IBW (Ideal Body Weight): 61 5 kg    Body mass index is 22 78 kg/m²  Weight (last 2 days)     Date/Time   Weight    21 06   62 1 (136 91)    21 06   73 (160 94)    21 0600   72 4 (159 61)            Physical Exam  Constitutional:       Appearance: He is well-developed  HENT:      Head: Normocephalic and atraumatic  Nose: Nose normal    Eyes:      General:         Right eye: No discharge  Left eye: No discharge  Conjunctiva/sclera: Conjunctivae normal       Pupils: Pupils are equal, round, and reactive to light  Neck:      Thyroid: No thyromegaly  Cardiovascular:      Rate and Rhythm: Normal rate and regular rhythm  Heart sounds: Normal heart sounds  No murmur heard  No friction rub  No gallop  Pulmonary:      Effort: Pulmonary effort is normal  No respiratory distress  Breath sounds: Normal breath sounds  No stridor  No wheezing or rales  Chest:      Chest wall: No tenderness  Abdominal:      General: Bowel sounds are normal  There is no distension  Palpations: Abdomen is soft  There is no mass  Tenderness: There is no abdominal tenderness  There is no guarding or rebound     Genitourinary:     Comments: Suprapubic catheter in place  Musculoskeletal:         General: No tenderness or deformity  Right lower leg: No edema  Left lower leg: No edema  Lymphadenopathy:      Cervical: No cervical adenopathy  Skin:     General: Skin is warm and dry  Comments: Numerous scaly lesions on scalp   Neurological:      Mental Status: He is alert  Psychiatric:         Mood and Affect: Mood normal          Behavior: Behavior normal          Thought Content: Thought content normal          Judgment: Judgment normal         LABORATORY DATA     Labs: I have personally reviewed pertinent reports  Results from last 7 days   Lab Units 07/01/21  0531 06/30/21  0438 06/29/21  0544   WBC Thousand/uL 7 88 8 46 9 10   HEMOGLOBIN g/dL 10 4* 10 8* 12 0   HEMATOCRIT % 32 5* 33 1* 36 8   PLATELETS Thousands/uL 306 337 311   NEUTROS PCT % 70 75 75   MONOS PCT % 7 6 6      Results from last 7 days   Lab Units 06/30/21  0437 06/29/21  0544 06/28/21  0438   POTASSIUM mmol/L 3 5 3 8 3 7   CHLORIDE mmol/L 105 106 105   CO2 mmol/L 26 28 27   BUN mg/dL 12 13 15   CREATININE mg/dL 0 88 0 91 1 06   CALCIUM mg/dL 8 7 8 8 8 8     Serum creatinine: 0 88 mg/dL 06/30/21 0437  Estimated creatinine clearance: 46 6 mL/min                Results from last 7 days   Lab Units 06/26/21  1917   LACTIC ACID mmol/L 1 6           IMAGING & DIAGNOSTIC TESTING     Radiology Results: I have personally reviewed pertinent reports  XR foot 3+ views RIGHT    Result Date: 6/27/2021  Impression: 2nd mid phalanx, 2nd distal phalanx osteomyelitis suspected  The study was marked in [facility] for immediate notification  Workstation performed: [id number]     Other Diagnostic Testing: I have personally reviewed pertinent reports        ACTIVE MEDICATIONS     Current Facility-Administered Medications   Medication Dose Route Frequency    acetaminophen (TYLENOL) tablet 488 mg  488 mg Oral Q6H PRN    aspirin (ECOTRIN LOW STRENGTH) EC tablet 81 mg  81 mg Oral Daily    atorvastatin (LIPITOR) tablet 10 mg  10 mg Oral Daily With Sprig bisacodyl (DULCOLAX) rectal suppository 10 mg  10 mg Rectal Daily PRN    bumetanide (BUMEX) tablet 0 5 mg  0 5 mg Oral Daily    cholecalciferol (VITAMIN D3) tablet 1,000 Units  1,000 Units Oral Daily    clopidogrel (PLAVIX) tablet 75 mg  75 mg Oral Daily    clotrimazole (LOTRIMIN) 1 % cream   Topical BID    enoxaparin (LOVENOX) subcutaneous injection 40 mg  40 mg Subcutaneous Daily    ferrous sulfate tablet 325 mg  325 mg Oral Daily With Breakfast    fluticasone (FLONASE) 50 mcg/act nasal spray 1 spray  1 spray Each Nare Daily    insulin glargine (LANTUS) subcutaneous injection 6 Units 0 06 mL  6 Units Subcutaneous Daily    insulin lispro (HumaLOG) 100 units/mL subcutaneous injection 1-5 Units  1-5 Units Subcutaneous 4x Daily (AC & HS)    melatonin tablet 3 mg  3 mg Oral HS    metoprolol tartrate (LOPRESSOR) tablet 25 mg  25 mg Oral Q12H AGGIE    pantoprazole (PROTONIX) EC tablet 40 mg  40 mg Oral BID    polyethylene glycol (MIRALAX) packet 17 g  17 g Oral Daily    saccharomyces boulardii (FLORASTOR) capsule 250 mg  250 mg Oral BID    senna-docusate sodium (SENOKOT S) 8 6-50 mg per tablet 2 tablet  2 tablet Oral HS    tamsulosin (FLOMAX) capsule 0 4 mg  0 4 mg Oral Daily With Dinner     VTE Pharmacologic Prophylaxis: Enoxaparin (Lovenox)  VTE Mechanical Prophylaxis: sequential compression device    ==  Shine Brennan MD  IM Resident, PGY-1  [facility]

## 2021-07-01 NOTE — CASE MANAGEMENT
CM was informed that pt is open to services / [facility]  Ecin referral made to same

## 2021-07-02 LAB
ANION GAP SERPL CALCULATED.3IONS-SCNC: 7 MMOL/L (ref 4–13)
BASOPHILS # BLD AUTO: 0.04 THOUSANDS/ΜL (ref 0–0.1)
BASOPHILS NFR BLD AUTO: 1 % (ref 0–1)
BUN SERPL-MCNC: 11 MG/DL (ref 5–25)
CALCIUM SERPL-MCNC: 8.7 MG/DL (ref 8.3–10.1)
CHLORIDE SERPL-SCNC: 105 MMOL/L (ref 100–108)
CO2 SERPL-SCNC: 26 MMOL/L (ref 21–32)
CREAT SERPL-MCNC: 0.8 MG/DL (ref 0.6–1.3)
EOSINOPHIL # BLD AUTO: 0.16 THOUSAND/ΜL (ref 0–0.61)
EOSINOPHIL NFR BLD AUTO: 2 % (ref 0–6)
ERYTHROCYTE [DISTWIDTH] IN BLOOD BY AUTOMATED COUNT: 12.7 % (ref 11.6–15.1)
GFR SERPL CREATININE-BSD FRML MDRD: 78 ML/MIN/1.73SQ M
GLUCOSE SERPL-MCNC: 113 MG/DL (ref 65–140)
GLUCOSE SERPL-MCNC: 130 MG/DL (ref 65–140)
GLUCOSE SERPL-MCNC: 183 MG/DL (ref 65–140)
GLUCOSE SERPL-MCNC: 85 MG/DL (ref 65–140)
GLUCOSE SERPL-MCNC: 95 MG/DL (ref 65–140)
HCT VFR BLD AUTO: 34.4 % (ref 36.5–49.3)
HGB BLD-MCNC: 11.2 G/DL (ref 12–17)
IMM GRANULOCYTES # BLD AUTO: 0.04 THOUSAND/UL (ref 0–0.2)
IMM GRANULOCYTES NFR BLD AUTO: 1 % (ref 0–2)
LYMPHOCYTES # BLD AUTO: 1.99 THOUSANDS/ΜL (ref 0.6–4.47)
LYMPHOCYTES NFR BLD AUTO: 23 % (ref 14–44)
MCH RBC QN AUTO: 31.1 PG (ref 26.8–34.3)
MCHC RBC AUTO-ENTMCNC: 32.6 G/DL (ref 31.4–37.4)
MCV RBC AUTO: 96 FL (ref 82–98)
MONOCYTES # BLD AUTO: 0.56 THOUSAND/ΜL (ref 0.17–1.22)
MONOCYTES NFR BLD AUTO: 7 % (ref 4–12)
NEUTROPHILS # BLD AUTO: 5.89 THOUSANDS/ΜL (ref 1.85–7.62)
NEUTS SEG NFR BLD AUTO: 66 % (ref 43–75)
NRBC BLD AUTO-RTO: 0 /100 WBCS
PLATELET # BLD AUTO: 317 THOUSANDS/UL (ref 149–390)
PMV BLD AUTO: 9.8 FL (ref 8.9–12.7)
POTASSIUM SERPL-SCNC: 3.9 MMOL/L (ref 3.5–5.3)
RBC # BLD AUTO: 3.6 MILLION/UL (ref 3.88–5.62)
SODIUM SERPL-SCNC: 138 MMOL/L (ref 136–145)
WBC # BLD AUTO: 8.68 THOUSAND/UL (ref 4.31–10.16)

## 2021-07-02 PROCEDURE — 87077 CULTURE AEROBIC IDENTIFY: CPT | Performed by: STUDENT IN AN ORGANIZED HEALTH CARE EDUCATION/TRAINING PROGRAM

## 2021-07-02 PROCEDURE — 85025 COMPLETE CBC W/AUTO DIFF WBC: CPT | Performed by: STUDENT IN AN ORGANIZED HEALTH CARE EDUCATION/TRAINING PROGRAM

## 2021-07-02 PROCEDURE — 87205 SMEAR GRAM STAIN: CPT | Performed by: STUDENT IN AN ORGANIZED HEALTH CARE EDUCATION/TRAINING PROGRAM

## 2021-07-02 PROCEDURE — 99231 SBSQ HOSP IP/OBS SF/LOW 25: CPT | Performed by: INTERNAL MEDICINE

## 2021-07-02 PROCEDURE — NC001 PR NO CHARGE: Performed by: PHYSICIAN ASSISTANT

## 2021-07-02 PROCEDURE — 99232 SBSQ HOSP IP/OBS MODERATE 35: CPT | Performed by: PODIATRIST

## 2021-07-02 PROCEDURE — 80048 BASIC METABOLIC PNL TOTAL CA: CPT | Performed by: STUDENT IN AN ORGANIZED HEALTH CARE EDUCATION/TRAINING PROGRAM

## 2021-07-02 PROCEDURE — 87070 CULTURE OTHR SPECIMN AEROBIC: CPT | Performed by: STUDENT IN AN ORGANIZED HEALTH CARE EDUCATION/TRAINING PROGRAM

## 2021-07-02 PROCEDURE — 0JBQ0ZX EXCISION OF RIGHT FOOT SUBCUTANEOUS TISSUE AND FASCIA, OPEN APPROACH, DIAGNOSTIC: ICD-10-PCS | Performed by: PODIATRIST

## 2021-07-02 PROCEDURE — 87186 SC STD MICRODIL/AGAR DIL: CPT | Performed by: STUDENT IN AN ORGANIZED HEALTH CARE EDUCATION/TRAINING PROGRAM

## 2021-07-02 PROCEDURE — 82948 REAGENT STRIP/BLOOD GLUCOSE: CPT

## 2021-07-02 RX ORDER — METRONIDAZOLE 500 MG/1
500 TABLET ORAL EVERY 8 HOURS SCHEDULED
Status: DISCONTINUED | OUTPATIENT
Start: 2021-07-02 | End: 2021-07-05

## 2021-07-02 RX ADMIN — FLUTICASONE PROPIONATE 1 SPRAY: 50 SPRAY, METERED NASAL at 09:39

## 2021-07-02 RX ADMIN — CLOPIDOGREL BISULFATE 75 MG: 75 TABLET ORAL at 09:37

## 2021-07-02 RX ADMIN — METRONIDAZOLE 500 MG: 500 TABLET ORAL at 15:24

## 2021-07-02 RX ADMIN — FERROUS SULFATE TAB 325 MG (65 MG ELEMENTAL FE) 325 MG: 325 (65 FE) TAB at 09:37

## 2021-07-02 RX ADMIN — Medication 250 MG: at 17:30

## 2021-07-02 RX ADMIN — BUMETANIDE 0.5 MG: 0.5 TABLET ORAL at 09:39

## 2021-07-02 RX ADMIN — METRONIDAZOLE 500 MG: 500 TABLET ORAL at 21:36

## 2021-07-02 RX ADMIN — CLOTRIMAZOLE: 0.01 CREAM TOPICAL at 17:31

## 2021-07-02 RX ADMIN — Medication 250 MG: at 09:39

## 2021-07-02 RX ADMIN — METOPROLOL TARTRATE 25 MG: 25 TABLET, FILM COATED ORAL at 21:36

## 2021-07-02 RX ADMIN — PANTOPRAZOLE SODIUM 40 MG: 40 TABLET, DELAYED RELEASE ORAL at 09:37

## 2021-07-02 RX ADMIN — CEFTRIAXONE 1000 MG: 1 INJECTION, POWDER, FOR SOLUTION INTRAMUSCULAR; INTRAVENOUS at 15:54

## 2021-07-02 RX ADMIN — MELATONIN TAB 3 MG 3 MG: 3 TAB at 21:36

## 2021-07-02 RX ADMIN — POLYETHYLENE GLYCOL 3350 17 G: 17 POWDER, FOR SOLUTION ORAL at 09:37

## 2021-07-02 RX ADMIN — Medication 1000 UNITS: at 09:37

## 2021-07-02 RX ADMIN — METOPROLOL TARTRATE 25 MG: 25 TABLET, FILM COATED ORAL at 09:37

## 2021-07-02 RX ADMIN — ASPIRIN 81 MG: 81 TABLET, COATED ORAL at 09:37

## 2021-07-02 RX ADMIN — INSULIN GLARGINE 6 UNITS: 100 INJECTION, SOLUTION SUBCUTANEOUS at 09:43

## 2021-07-02 RX ADMIN — CLOTRIMAZOLE: 0.01 CREAM TOPICAL at 09:39

## 2021-07-02 RX ADMIN — INSULIN LISPRO 1 UNITS: 100 INJECTION, SOLUTION INTRAVENOUS; SUBCUTANEOUS at 11:43

## 2021-07-02 RX ADMIN — ENOXAPARIN SODIUM 40 MG: 40 INJECTION SUBCUTANEOUS at 09:37

## 2021-07-02 RX ADMIN — TAMSULOSIN HYDROCHLORIDE 0.4 MG: 0.4 CAPSULE ORAL at 15:55

## 2021-07-02 RX ADMIN — DOCUSATE SODIUM AND SENNOSIDES 2 TABLET: 8.6; 5 TABLET ORAL at 21:36

## 2021-07-02 RX ADMIN — ATORVASTATIN CALCIUM 10 MG: 20 TABLET, FILM COATED ORAL at 15:54

## 2021-07-02 RX ADMIN — PANTOPRAZOLE SODIUM 40 MG: 40 TABLET, DELAYED RELEASE ORAL at 17:31

## 2021-07-02 NOTE — ACP (ADVANCE CARE PLANNING)
Called Daria ELY, to discuss POLST form  She asks that I email this to her to review and will call back when ready to complete  Encouraged completion prior to discharge

## 2021-07-02 NOTE — PROGRESS NOTES
[facility] Podiatry - Progress Note  Patient: Tino Brenner 80 y o  male   MRN: [id number]  PCP: Siria Goldstein MD  Unit/Bed#: [facility] 320-01 Encounter: [id number]  Date Of Visit: 21    ASSESSMENT:    Tino Brenner is a 80 y o  male with:    1  R 2nd digit ulceration probing to bone - Aparicio 3 - POA  2  T2DM  3  Dementia       PLAN:    · Soft tissue culture was taken using tissue nipper from wound per I/D request   · Continue Local wound care outpatient  Dressed with betadine and DSD  · Elevation on green foam wedges or pillows when non-ambulatory  · Rest of care per primary team       SUBJECTIVE:     The patient was seen, evaluated, and assessed at bedside today  The patient was  alert, and in no acute distress  No acute events overnight  The patient reports that he is feeling fine  Patient denies N/V/F/chills/SOB/CP  OBJECTIVE:     Vitals:   /60 (BP Location: Left arm)   Pulse 101   Temp (!) 97 3 °F (36 3 °C) (Oral)   Resp 20   Ht 5' 5" (1 651 m)   Wt 62 kg (136 lb 11 oz)   SpO2 97%   BMI 22 75 kg/m²     Temp (24hrs), Av °F (36 7 °C), Min:97 3 °F (36 3 °C), Max:98 4 °F (36 9 °C)      Physical Exam:     General:  Alert, cooperative, and in no distress  Lower extremity exam:  Cardiovascular status at baseline  Neurological status at baseline  Musculoskeletal status at baseline  No calf tenderness noted  Wound (1) located dorsal aspect of RIGHT second toe PIPJ, measures approximately 1 cm x 1 cm x 0 1 cm  without sinus tracking or undermining  Wound bed appears eschar with no drainage  Deepest tissue noted in base is bone  Malodor is not noticed  Wound edge appears Attached  Rasheeda-wound skin appears erythematous  Probe to bone is positive  Signs of infection are present at this time           Additional Data:     Labs:    Results from last 7 days   Lab Units 21  0625   WBC Thousand/uL 8 68   HEMOGLOBIN g/dL 11 2*   HEMATOCRIT % 34 4*   PLATELETS Thousands/uL 317   NEUTROS PCT % 66 LYMPHS PCT % 23   MONOS PCT % 7   EOS PCT % 2     Results from last 7 days   Lab Units 07/02/21  0625   POTASSIUM mmol/L 3 9   CHLORIDE mmol/L 105   CO2 mmol/L 26   BUN mg/dL 11   CREATININE mg/dL 0 80   CALCIUM mg/dL 8 7           * I Have Reviewed All Lab Data Listed Above  Recent Cultures (last 7 days):               Imaging: I have personally reviewed pertinent films in PACS  EKG, Pathology, and Other Studies: I have personally reviewed pertinent reports  ** Please Note: Portions of the record may have been created with voice recognition software  Occasional wrong word or "sound a like" substitutions may have occurred due to the inherent limitations of voice recognition software  Read the chart carefully and recognize, using context, where substitutions have occurred   **

## 2021-07-02 NOTE — PROGRESS NOTES
INTERNAL MEDICINE RESIDENCY PROGRESS NOTE     PATIENT INFORMATION     Name: Chantal Betancourt   Age & Sex: 80 y o  male   MRN: [id number]  Hospital Stay Days: 6  Unit/Bed#: [facility] 320-01   Encounter: [id number]  Team: SOD Team B     ASSESSMENT/PLAN     Principal Problem:    Right second toe ulcer ([facility] )  Active Problems:    Dementia without behavioral disturbance ([place] [address] )    History of coronary artery disease    Peripheral vascular disease in diabetes mellitus ([place] [address] )    BPH (benign prostatic hyperplasia)    Type 2 diabetes mellitus, with long-term current use of insulin ([facility])    Diastolic heart failure ([place] [address] )    Hyperlipidemia    Hypertension    Anemia    Hearing difficulty of both ears    Actinic dermatitis    Constipation    Dark urine    Dark urine  Assessment & Plan  Noted in suprapubic cathter bag  UA unreliable due to interfering substance  Received IV hydration  Today urine appeared lighter   Continue to monitor vitals    Constipation  Assessment & Plan  Unknown last bowel movement, patient reported 1 bowel movement yesterday however nothing charted  · Miralax 17 g daily   · Senokot daily  · Dulcolax 10 mg suppository PRN      Actinic dermatitis  Assessment & Plan  Attending admitted noted on admission including scald and different areas mainly bilateral groin and right lower extremity  · Clotrimazole cream  · Wound care consult    Hearing difficulty of both ears  Assessment & Plan  Hearing difficulty noted on admission most likely presbycusis         Anemia  Assessment & Plan  History of anemia, hemoglobin baseline 11-13, hemoglobin stable  · Continue home ferrous sulfate 325 ; will give every other day instead of daily while inpatient  · Bowel regimen daily  · May need MMA, folate, iron panel as outpatient  · Continue to monitor daily CBC      Hypertension  Assessment & Plan  History of hypertension, blood pressure well controlled on admission 130s over 60s  · Continue home Lopressor 25 b i d   · Continue Bumex 0 5 mg daily - diastolic heart failure  · Monitor vitals    Hyperlipidemia  Assessment & Plan  Most recent lipid panel 6/27 Chol 127, TG 88, HDL 54, LDL 55  home medication Lipitor 10 mg daily, history of CAD, and peripheral vascular disease  · Continue on home Lipitor 10 mg daily for now  · Continue home aspirin Plavix    Diastolic heart failure (HCC)  Assessment & Plan  Wt Readings from Last 3 Encounters:   07/02/21 62 kg (136 lb 11 oz)   03/10/20 77 1 kg (170 lb)   09/10/19 77 1 kg (170 lb)     History of chronic diastolic heart failure, right lower extremity grade 1 edema on admission, denies shortness of breath, CTAB on admission  Plan  · Continue home Lopressor 25 b i d   · Continue home Bumex 0 5 mg q d  · Daily in/out  · Cardiac diet, Na <2G and <2L fluid     Type 2 diabetes mellitus, with long-term current use of insulin [facility])  Assessment & Plan  Lab Results   Component Value Date    HGBA1C 6 9 (H) 06/26/2021       Recent Labs     07/01/21  1554 07/01/21  2105 07/02/21  0647 07/02/21  1127   POCGLU 161* 152* 95 183*       Blood Sugar Average: Last 72 hrs:  (P) 119 [id number]   Relatively controlled H A1c 6 9  Home medications include Lantus 12 units at noon, and metformin 500  · Hold metformin while inpatient  · Decreased Lantus to 6 units in setting of decreased oral intake and sugars in 's  · Monitor glucose and adjust as needed    BPH (benign prostatic hyperplasia)  Assessment & Plan  Known history of large prostate, came with suprapubic Hays in place  · Continue home Flomax 0 4 mg daily  · Consider holding Myrbetriq given the suprapubic Hays in place    Peripheral vascular disease in diabetes mellitus [facility])  Assessment & Plan  S/p L BKA  6/27 Lower limb arterial duplex - Diffuse disease noted throughout the femoropopliteal arteries without  significant focal stenosis  Evidence suggestive of Tibioperoneal occlusive disease     Ankle/Brachial index: Non-compressible  Metatarsal pressure and Great toe pressure unobtainable secondary to attenuated PPG waveform  PVR/ PPG tracings are dampened  · Continue ASA 81 mg daily  · Continue Plavix 75 mg daily  · See 2nd right toe ulcer assessment and plan    History of coronary artery disease  Assessment & Plan  History of coronary artery disease, denies any chest pain or palpitation on admission    Plan  · Continue aspirin 81 mg daily  · Continue Plavix 7 mg daily  · Continue atorvastatin 10 mg daily    Dementia without behavioral disturbance (HCC)  Assessment & Plan  History of dementia without behavioral disturbance, old history of stroke, AO x1 to self, baseline and stable  His niece, Danna Mclean, is POA  Contact at [phone]     * Right second toe ulcer [facility])  Assessment & Plan  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 confirmed by foot x-ray  Leukocytosis on admission 11 5, now 8 46  CRP of 18 4, ESR 59 on admission  Patient is afebrile, no tachycardia no tachypnea, blood pressure WNL  · Podiatry following, appreciate recommendations  Patient and POA Kirti Bound) leaning toward non-surgical treatment  · ID consulted, recommending holding antibiotic therapy for 48-72 hours and biopsy of the right 2nd toe ulcer to help guide and optimized therapy  · Culture of toe ulcer taken 7/2 -> f/u results to help guide antibiotic treatment  · Daily CBC  · Monitor vitals  · Wound care  · P r n  Tylenol        Disposition: Inpatient     SUBJECTIVE     Patient seen and examined  No acute events overnight  Today overall the patient feels well and had no acute complaints  He states that his toe ulcer is not bothering him  Otherwise he had no other acute complaints      OBJECTIVE     Vitals:    07/01/21 2121 07/01/21 2230 07/02/21 0600 07/02/21 0900   BP: 101/68 120/69  122/60   BP Location:  Left arm  Left arm   Pulse: 82 99  101   Resp:  20  20   Temp:  98 4 °F (36 9 °C)  (!) 97 3 °F (36 3 °C)   TempSrc:  Oral  Oral   SpO2:  97%  97%   Weight: 62 kg (136 lb 11 oz)    Height:          Temperature:   Temp (24hrs), Av °F (36 7 °C), Min:97 3 °F (36 3 °C), Max:98 4 °F (36 9 °C)    Temperature: (!) 97 3 °F (36 3 °C)  Intake & Output:  I/O        07 -  0700  07 -  07 07 -  0700    P  O  480      I V  (mL/kg) 620 (10)      Total Intake(mL/kg) 1100 (17 7)      Urine (mL/kg/hr) 2000 (1 3) 1450 (1)     Total Output  1450     Net -900 -1450                  Intake/Output Summary (Last 24 hours) at 2021 1308  Last data filed at 2021 0322  Gross per 24 hour   Intake --   Output 1050 ml   Net -1050 ml     No intake/output data recorded  Weights:   IBW (Ideal Body Weight): 61 5 kg    Body mass index is 22 75 kg/m²  Weight (last 2 days)     Date/Time   Weight    21 06   62 (136 69)    21 06   62 1 (136 91)    21 0600   73 (160 94)            Physical Exam  Constitutional:       Appearance: He is well-developed  HENT:      Head: Normocephalic and atraumatic  Nose: Nose normal    Eyes:      General:         Right eye: No discharge  Left eye: No discharge  Conjunctiva/sclera: Conjunctivae normal       Pupils: Pupils are equal, round, and reactive to light  Neck:      Thyroid: No thyromegaly  Cardiovascular:      Rate and Rhythm: Normal rate and regular rhythm  Heart sounds: Normal heart sounds  No murmur heard  No friction rub  No gallop  Pulmonary:      Effort: Pulmonary effort is normal  No respiratory distress  Breath sounds: Normal breath sounds  No stridor  No wheezing or rales  Chest:      Chest wall: No tenderness  Abdominal:      General: Bowel sounds are normal  There is no distension  Palpations: Abdomen is soft  There is no mass  Tenderness: There is no abdominal tenderness  There is no guarding or rebound  Genitourinary:     Comments: Suprapubic catheter in place  Musculoskeletal:         General: No tenderness or deformity  Right lower leg: No edema  Comments: Left BKA   Lymphadenopathy:      Cervical: No cervical adenopathy  Skin:     General: Skin is warm and dry  Comments: Ulcer on right lower extremity  Numerous scaly lesions on scalp  Neurological:      Mental Status: He is alert  Psychiatric:         Mood and Affect: Mood normal          Behavior: Behavior normal          Thought Content: Thought content normal          Judgment: Judgment normal         LABORATORY DATA     Labs: I have personally reviewed pertinent reports  Results from last 7 days   Lab Units 07/02/21  0625 07/01/21  0531 06/30/21  0438   WBC Thousand/uL 8 68 7 88 8 46   HEMOGLOBIN g/dL 11 2* 10 4* 10 8*   HEMATOCRIT % 34 4* 32 5* 33 1*   PLATELETS Thousands/uL 317 306 337   NEUTROS PCT % 66 70 75   MONOS PCT % 7 7 6      Results from last 7 days   Lab Units 07/02/21  0625 06/30/21  0437 06/29/21  0544   POTASSIUM mmol/L 3 9 3 5 3 8   CHLORIDE mmol/L 105 105 106   CO2 mmol/L 26 26 28   BUN mg/dL 11 12 13   CREATININE mg/dL 0 80 0 88 0 91   CALCIUM mg/dL 8 7 8 7 8 8     Serum creatinine: 0 8 mg/dL 07/02/21 1988  Estimated creatinine clearance: 51 3 mL/min                Results from last 7 days   Lab Units 06/26/21  1917   LACTIC ACID mmol/L 1 6           IMAGING & DIAGNOSTIC TESTING     Radiology Results: I have personally reviewed pertinent reports  XR foot 3+ views RIGHT    Result Date: 6/27/2021  Impression: 2nd mid phalanx, 2nd distal phalanx osteomyelitis suspected  The study was marked in [facility] for immediate notification  Workstation performed: [id number]     Other Diagnostic Testing: I have personally reviewed pertinent reports        ACTIVE MEDICATIONS     Current Facility-Administered Medications   Medication Dose Route Frequency    acetaminophen (TYLENOL) tablet 488 mg  488 mg Oral Q6H PRN    aspirin (ECOTRIN LOW STRENGTH) EC tablet 81 mg  81 mg Oral Daily    atorvastatin (LIPITOR) tablet 10 mg  10 mg Oral Daily With [facility] EHR bisacodyl (DULCOLAX) rectal suppository 10 mg  10 mg Rectal Daily PRN    bumetanide (BUMEX) tablet 0 5 mg  0 5 mg Oral Daily    cholecalciferol (VITAMIN D3) tablet 1,000 Units  1,000 Units Oral Daily    clopidogrel (PLAVIX) tablet 75 mg  75 mg Oral Daily    clotrimazole (LOTRIMIN) 1 % cream   Topical BID    enoxaparin (LOVENOX) subcutaneous injection 40 mg  40 mg Subcutaneous Daily    ferrous sulfate tablet 325 mg  325 mg Oral Daily With Breakfast    fluticasone (FLONASE) 50 mcg/act nasal spray 1 spray  1 spray Each Nare Daily    insulin glargine (LANTUS) subcutaneous injection 6 Units 0 06 mL  6 Units Subcutaneous Daily    insulin lispro (HumaLOG) 100 units/mL subcutaneous injection 1-5 Units  1-5 Units Subcutaneous 4x Daily (AC & HS)    melatonin tablet 3 mg  3 mg Oral HS    metoprolol tartrate (LOPRESSOR) tablet 25 mg  25 mg Oral Q12H AGGIE    pantoprazole (PROTONIX) EC tablet 40 mg  40 mg Oral BID    polyethylene glycol (MIRALAX) packet 17 g  17 g Oral Daily    saccharomyces boulardii (FLORASTOR) capsule 250 mg  250 mg Oral BID    senna-docusate sodium (SENOKOT S) 8 6-50 mg per tablet 2 tablet  2 tablet Oral HS    tamsulosin (FLOMAX) capsule 0 4 mg  0 4 mg Oral Daily With Dinner     VTE Pharmacologic Prophylaxis: Enoxaparin (Lovenox)  VTE Mechanical Prophylaxis: sequential compression device    ==  Angeline Romo MD  IM Resident, PGY-2  [facility]

## 2021-07-02 NOTE — PROGRESS NOTES
Progress Note - Infectious Disease   Anthony Hurtado 80 y o  male MRN: [id number]  Unit/Bed#: [facility] 320-01 Encounter: [id number]    Impression/Recommendations:   RT 2nd toe diabetic ulcer with osteomyelitis confirmed by right foot x-ray  Patient is currently afebrile, without leukocytosis  Nonseptic in appearance  Elevated ESR 59, Crp 18 are noted  Patient and his POA Mustapha Arias are both declining surgical intervention including amputation right 2nd toe  They would prefer to treat conservatively with antibiotic therapy  S/p biopsy today  o Follow tissue cx from biopsy to help guide and optimize antibiotic therapy  o Start ceftriaxone 1g IV Q 24 hours and metronidazole 500 mg p o  Q 8 hours pending culture results  o Antibiotic therapy alone is a suboptimal treatment approach for osteomyelitis  Without surgical intervention, no guarantees for cure of infection were provided to the patient   o Local wound care per Podiatry service  o Monitor clinical response, temperature curve off ABx   Leukocytosis: noted at the time of admission, but has since normalized  Lactate is WNL  o Monitor clinical response, temperature curve   Diabetes mellitus type 2 with hyperglycemia: noted HbA1C of 6 9 on 6/26/21   o Glycemic control per primary team   Peripheral arterial disease:  Status post left BKA  Arterial duplex right lower extremity demonstrates tibioperoneal occlusive disease without significant focal stenosis   History of diastolic heart failure   o Management per primary team   Advanced age    My recommendations were discussed with the patient  My recommendations were securely texted to resident Dr Blake Love from the primary team   Thank you for allowing me to participate in the care of this patient  The ID service will follow      Antibiotics: none  Scheduled Meds:  Current Facility-Administered Medications   Medication Dose Route Frequency Provider Last Rate    acetaminophen  488 mg Oral Q6H PRN Vivienne Paige MD  aspirin  81 mg Oral Daily Jasvir Ku MD      atorvastatin  10 mg Oral Daily With Blanca Schulte MD      bisacodyl  10 mg Rectal Daily PRN Cedric Walters DO      bumetanide  0 5 mg Oral Daily Jasvir Ku MD      cefTRIAXone  1,000 mg Intravenous Q24H Araceli Kelly, DO      cholecalciferol  1,000 Units Oral Daily Jasvir Ku MD      clopidogrel  75 mg Oral Daily Jasvir Ku MD      clotrimazole   Topical BID Eric Dumont,       enoxaparin  40 mg Subcutaneous Daily Jasvir Ku MD      ferrous sulfate  325 mg Oral Daily With Breakfast Jasvir Ku MD      fluticasone  1 spray Each Nare Daily Jasvir Ku MD      insulin glargine  6 Units Subcutaneous Daily Ronni Ivey MD      insulin lispro  1-5 Units Subcutaneous 4x Daily (AC & HS) Layton Perez, DO      melatonin  3 mg Oral HS Layton Perez DO      metoprolol tartrate  25 mg Oral Q12H [address] Jasvir Ku MD      metroNIDAZOLE  500 mg Oral [facility] Abraham Baca,       pantoprazole  40 mg Oral BID Jasvir Ku MD      polyethylene glycol  17 g Oral Daily Layton Perez DO      saccharomyces boulardii  250 mg Oral BID Cedric Walters,       senna-docusate sodium  2 tablet Oral HS Cedric Walters DO      tamsulosin  0 4 mg Oral Daily With Blanca Schulte MD       Continuous Infusions:   PRN Meds:   acetaminophen    bisacodyl    Subjective:  Patient underwent right foot biopsy today  He denies right foot pain  He denies fever, chills, vomiting, diarrhea, shortness of breath       Objective:  Vitals:  Temp:  [97 3 °F (36 3 °C)-98 4 °F (36 9 °C)] 97 3 °F (36 3 °C)  HR:  [] 101  Resp:  [20-22] 20  BP: (101-122)/(54-69) 122/60  SpO2:  [97 %] 97 %  Temp (24hrs), Av °F (36 7 °C), Min:97 3 °F (36 3 °C), Max:98 4 °F (36 9 °C)  Current: Temperature: (!) 97 3 °F (36 3 °C)  Physical Exam:   General Appearance:  Elderly man, resting in bed, no acute distress   ENT: Oropharynx moist without lesions   Lungs:   Clear to auscultation bilaterally; respirations unlabored   Heart:  S1, S2, RRR, no murmur   Abdomen:   Soft, non-tender, non-distended   : Suprapubic catheter present   Extremities: No distal leg edema b/l, LT BKA noted   Neurologic: Pt is hard of hearing  AAO to person, surroundings, conversant, fluent speech   Skin: No rash  RT 2nd toe dressing clean, dry, intact     Labs, Cultures, Imaging, & Other studies:   All pertinent labs, cultures and imaging studies were personally reviewed  Results from last 7 days   Lab Units 07/02/21  0625 07/01/21  0531 06/30/21  0438   WBC Thousand/uL 8 68 7 88 8 46   HEMOGLOBIN g/dL 11 2* 10 4* 10 8*   PLATELETS Thousands/uL 317 306 337     Results from last 7 days   Lab Units 07/02/21  0625 06/30/21  0437 06/29/21  0544   SODIUM mmol/L 138 137 140   POTASSIUM mmol/L 3 9 3 5 3 8   CHLORIDE mmol/L 105 105 106   CO2 mmol/L 26 26 28   BUN mg/dL 11 12 13   CREATININE mg/dL 0 80 0 88 0 91   EGFR ml/min/1 73sq m 78 75 73   CALCIUM mg/dL 8 7 8 7 8 8     Results from last 7 days   Lab Units 06/26/21  1916   CRP mg/L 18 4*     Imaging Studies:   6/26/21 RT foot XR: 2nd mid phalanx, 2nd distal phalanx osteomyelitis suspected  Image reviewed in PACS      6/27/21 Arterial duplex RLE: Diffuse disease noted throughout the femoropopliteal arteries without  significant focal stenosis  Evidence suggestive of Tibioperoneal occlusive  disease

## 2021-07-02 NOTE — PLAN OF CARE
Problem: MOBILITY - ADULT  Goal: Maintain or return to baseline ADL function  Description: INTERVENTIONS:  -  Assess patient's ability to carry out ADLs; assess patient's baseline for ADL function and identify physical deficits which impact ability to perform ADLs (bathing, care of mouth/teeth, toileting, grooming, dressing, etc )  - Assess/evaluate cause of self-care deficits   - Assess range of motion  - Assess patient's mobility; develop plan if impaired  - Assess patient's need for assistive devices and provide as appropriate  - Encourage maximum independence but intervene and supervise when necessary  - Involve family in performance of ADLs  - Assess for home care needs following discharge   - Consider OT consult to assist with ADL evaluation and planning for discharge  - Provide patient education as appropriate  Outcome: Progressing  Goal: Maintains/Returns to pre admission functional level  Description: INTERVENTIONS:  - Perform BMAT or MOVE assessment daily    - Set and communicate daily mobility goal to care team and patient/family/caregiver  - Collaborate with rehabilitation services on mobility goals if consulted  - Perform Range of Motion  times a day  - Reposition patient every  hours    - Dangle patient times a day  - Stand patient times a day  - Ambulate patient  times a day  - Out of bed to chair times a day   - Out of bed for meals  times a day  - Out of bed for toileting  - Record patient progress and toleration of activity level   Outcome: Progressing     Problem: SKIN/TISSUE INTEGRITY - ADULT  Goal: Incision(s), wounds(s) or drain site(s) healing without S/S of infection  Description: INTERVENTIONS  - Assess and document dressing, incision, wound bed, drain sites and surrounding tissue  - Provide patient and family education  - Perform skin care/dressing changes every   Outcome: Progressing  Goal: Pressure injury heals and does not worsen  Description: Interventions:  - Implement low air loss mattress or specialty surface (Criteria met)  - Apply silicone foam dressing  - Instruct/assist with weight shifting every minutes when in chair   - Limit chair time to hour intervals  - Use special pressure reducing interventions such as  when in chair   - Apply fecal or urinary incontinence containment device   - Perform passive or active ROM every - Turn and reposition patient & offload bony prominences every hours   - Utilize friction reducing device or surface for transfers   - Consider consults to  interdisciplinary teams such as   - Use incontinent care products after each incontinent episode such as  - Consider nutrition services referral as needed  Outcome: Progressing     Problem: MUSCULOSKELETAL - ADULT  Goal: Maintain or return mobility to safest level of function  Description: INTERVENTIONS:  - Assess patient's ability to carry out ADLs; assess patient's baseline for ADL function and identify physical deficits which impact ability to perform ADLs (bathing, care of mouth/teeth, toileting, grooming, dressing, etc )  - Assess/evaluate cause of self-care deficits   - Assess range of motion  - Assess patient's mobility  - Assess patient's need for assistive devices and provide as appropriate  - Encourage maximum independence but intervene and supervise when necessary  - Involve family in performance of ADLs  - Assess for home care needs following discharge   - Consider OT consult to assist with ADL evaluation and planning for discharge  - Provide patient education as appropriate  Outcome: Progressing     Problem: Prexisting or High Potential for Compromised Skin Integrity  Goal: Skin integrity is maintained or improved  Description: INTERVENTIONS:  - Identify patients at risk for skin breakdown  - Assess and monitor skin integrity  - Assess and monitor nutrition and hydration status  - Monitor labs   - Assess for incontinence   - Turn and reposition patient  - Assist with mobility/ambulation  - Relieve pressure over bony prominences  - Avoid friction and shearing  - Provide appropriate hygiene as needed including keeping skin clean and dry  - Evaluate need for skin moisturizer/barrier cream  - Collaborate with interdisciplinary team   - Patient/family teaching  - Consider wound care consult   Outcome: Progressing     Problem: Potential for Falls  Goal: Patient will remain free of falls  Description: INTERVENTIONS:  - Educate patient/family on patient safety including physical limitations  - Instruct patient to call for assistance with activity   - Consult OT/PT to assist with strengthening/mobility   - Keep Call bell within reach  - Keep bed low and locked with side rails adjusted as appropriate  - Keep care items and personal belongings within reach  - Initiate and maintain comfort rounds  - Make Fall Risk Sign visible to staff  - Offer Toileting every  Hours, in advance of need  - Initiate/Maintain alarm  - Obtain necessary fall risk management equipment:   - Apply yellow socks and bracelet for high fall risk patients  - Consider moving patient to room near nurses station  Outcome: Progressing

## 2021-07-03 PROBLEM — E78.5 HYPERLIPIDEMIA: Status: RESOLVED | Noted: 2021-06-26 | Resolved: 2021-07-03

## 2021-07-03 LAB
ANION GAP SERPL CALCULATED.3IONS-SCNC: 5 MMOL/L (ref 4–13)
BASOPHILS # BLD AUTO: 0.02 THOUSANDS/ΜL (ref 0–0.1)
BASOPHILS NFR BLD AUTO: 0 % (ref 0–1)
BUN SERPL-MCNC: 11 MG/DL (ref 5–25)
CALCIUM SERPL-MCNC: 8.8 MG/DL (ref 8.3–10.1)
CHLORIDE SERPL-SCNC: 104 MMOL/L (ref 100–108)
CO2 SERPL-SCNC: 26 MMOL/L (ref 21–32)
CREAT SERPL-MCNC: 0.86 MG/DL (ref 0.6–1.3)
EOSINOPHIL # BLD AUTO: 0.23 THOUSAND/ΜL (ref 0–0.61)
EOSINOPHIL NFR BLD AUTO: 3 % (ref 0–6)
ERYTHROCYTE [DISTWIDTH] IN BLOOD BY AUTOMATED COUNT: 12.8 % (ref 11.6–15.1)
GFR SERPL CREATININE-BSD FRML MDRD: 75 ML/MIN/1.73SQ M
GLUCOSE SERPL-MCNC: 108 MG/DL (ref 65–140)
GLUCOSE SERPL-MCNC: 133 MG/DL (ref 65–140)
GLUCOSE SERPL-MCNC: 133 MG/DL (ref 65–140)
GLUCOSE SERPL-MCNC: 135 MG/DL (ref 65–140)
GLUCOSE SERPL-MCNC: 89 MG/DL (ref 65–140)
HCT VFR BLD AUTO: 33 % (ref 36.5–49.3)
HGB BLD-MCNC: 10.6 G/DL (ref 12–17)
IMM GRANULOCYTES # BLD AUTO: 0.04 THOUSAND/UL (ref 0–0.2)
IMM GRANULOCYTES NFR BLD AUTO: 0 % (ref 0–2)
LYMPHOCYTES # BLD AUTO: 1.88 THOUSANDS/ΜL (ref 0.6–4.47)
LYMPHOCYTES NFR BLD AUTO: 21 % (ref 14–44)
MCH RBC QN AUTO: 30.9 PG (ref 26.8–34.3)
MCHC RBC AUTO-ENTMCNC: 32.1 G/DL (ref 31.4–37.4)
MCV RBC AUTO: 96 FL (ref 82–98)
MONOCYTES # BLD AUTO: 0.56 THOUSAND/ΜL (ref 0.17–1.22)
MONOCYTES NFR BLD AUTO: 6 % (ref 4–12)
NEUTROPHILS # BLD AUTO: 6.36 THOUSANDS/ΜL (ref 1.85–7.62)
NEUTS SEG NFR BLD AUTO: 70 % (ref 43–75)
NRBC BLD AUTO-RTO: 0 /100 WBCS
PLATELET # BLD AUTO: 320 THOUSANDS/UL (ref 149–390)
PMV BLD AUTO: 9.9 FL (ref 8.9–12.7)
POTASSIUM SERPL-SCNC: 4 MMOL/L (ref 3.5–5.3)
RBC # BLD AUTO: 3.43 MILLION/UL (ref 3.88–5.62)
SODIUM SERPL-SCNC: 135 MMOL/L (ref 136–145)
WBC # BLD AUTO: 9.09 THOUSAND/UL (ref 4.31–10.16)

## 2021-07-03 PROCEDURE — 99232 SBSQ HOSP IP/OBS MODERATE 35: CPT | Performed by: INTERNAL MEDICINE

## 2021-07-03 PROCEDURE — 82948 REAGENT STRIP/BLOOD GLUCOSE: CPT

## 2021-07-03 PROCEDURE — 85025 COMPLETE CBC W/AUTO DIFF WBC: CPT | Performed by: STUDENT IN AN ORGANIZED HEALTH CARE EDUCATION/TRAINING PROGRAM

## 2021-07-03 PROCEDURE — 80048 BASIC METABOLIC PNL TOTAL CA: CPT | Performed by: STUDENT IN AN ORGANIZED HEALTH CARE EDUCATION/TRAINING PROGRAM

## 2021-07-03 RX ADMIN — TAMSULOSIN HYDROCHLORIDE 0.4 MG: 0.4 CAPSULE ORAL at 17:19

## 2021-07-03 RX ADMIN — ACETAMINOPHEN 488 MG: 325 TABLET, FILM COATED ORAL at 17:21

## 2021-07-03 RX ADMIN — PANTOPRAZOLE SODIUM 40 MG: 40 TABLET, DELAYED RELEASE ORAL at 08:59

## 2021-07-03 RX ADMIN — Medication 250 MG: at 09:02

## 2021-07-03 RX ADMIN — CLOTRIMAZOLE: 0.01 CREAM TOPICAL at 17:22

## 2021-07-03 RX ADMIN — FERROUS SULFATE TAB 325 MG (65 MG ELEMENTAL FE) 325 MG: 325 (65 FE) TAB at 08:59

## 2021-07-03 RX ADMIN — METRONIDAZOLE 500 MG: 500 TABLET ORAL at 21:06

## 2021-07-03 RX ADMIN — Medication 1000 UNITS: at 08:59

## 2021-07-03 RX ADMIN — ASPIRIN 81 MG: 81 TABLET, COATED ORAL at 08:51

## 2021-07-03 RX ADMIN — METRONIDAZOLE 500 MG: 500 TABLET ORAL at 05:48

## 2021-07-03 RX ADMIN — ATORVASTATIN CALCIUM 10 MG: 20 TABLET, FILM COATED ORAL at 17:19

## 2021-07-03 RX ADMIN — FLUTICASONE PROPIONATE 1 SPRAY: 50 SPRAY, METERED NASAL at 09:02

## 2021-07-03 RX ADMIN — Medication 250 MG: at 17:19

## 2021-07-03 RX ADMIN — ENOXAPARIN SODIUM 40 MG: 40 INJECTION SUBCUTANEOUS at 08:50

## 2021-07-03 RX ADMIN — CEFTRIAXONE 1000 MG: 1 INJECTION, POWDER, FOR SOLUTION INTRAMUSCULAR; INTRAVENOUS at 14:25

## 2021-07-03 RX ADMIN — INSULIN GLARGINE 6 UNITS: 100 INJECTION, SOLUTION SUBCUTANEOUS at 08:50

## 2021-07-03 RX ADMIN — CLOTRIMAZOLE: 0.01 CREAM TOPICAL at 09:03

## 2021-07-03 RX ADMIN — PANTOPRAZOLE SODIUM 40 MG: 40 TABLET, DELAYED RELEASE ORAL at 17:20

## 2021-07-03 RX ADMIN — METRONIDAZOLE 500 MG: 500 TABLET ORAL at 14:25

## 2021-07-03 RX ADMIN — CLOPIDOGREL BISULFATE 75 MG: 75 TABLET ORAL at 08:59

## 2021-07-03 RX ADMIN — POLYETHYLENE GLYCOL 3350 17 G: 17 POWDER, FOR SOLUTION ORAL at 09:01

## 2021-07-03 RX ADMIN — MELATONIN TAB 3 MG 3 MG: 3 TAB at 21:06

## 2021-07-03 RX ADMIN — BUMETANIDE 0.5 MG: 0.5 TABLET ORAL at 09:02

## 2021-07-03 NOTE — QUICK NOTE
Spoke to patient's POA 2D over the phone  Discussed that patient would require likely long-term antibiotics based on the cultures that we get from the biopsy which was done yesterday  She is agreeable to a PICC line  Answered all of her other questions as well

## 2021-07-03 NOTE — PROGRESS NOTES
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 toe diabetic ulcer with osteomyelitis confirmed by right foot x-ray  Patient is currently afebrile, without leukocytosis  Nonseptic in appearance  Elevated ESR 59, Crp 18 are noted  Patient and his POA Osker Advent are both declining surgical intervention including amputation right 2nd toe  They would prefer to treat conservatively with antibiotic therapy  S/p biopsy on 7/2    o Follow tissue cx from biopsy to help guide and optimize antibiotic therapy: 2+ GNR noted  o Continue ceftriaxone 1g IV Q 24 hours and metronidazole 500 mg p o  Q 8 hours pending culture results  o Antibiotic therapy alone is a suboptimal treatment approach for osteomyelitis  Without surgical intervention, no guarantees for cure of infection were provided to the patient  o Local wound care per Podiatry service  o Monitor clinical response, temperature curve   Leukocytosis: noted at the time of admission, but has since normalized  Lactate is WNL  o Monitor clinical response, temperature curve   Diabetes mellitus type 2 with hyperglycemia: noted HbA1C of 6 9 on 6/26/21   o Glycemic control per primary team   Peripheral arterial disease:  Status post left BKA  Arterial duplex right lower extremity demonstrates tibioperoneal occlusive disease without significant focal stenosis   History of diastolic heart failure   o Management per primary team   Advanced age    Thank you for allowing me to participate in the care of this patient  The ID service will follow      Antibiotics: ceftriaxone, metrondiazole since 7/2  Scheduled Meds:  Current Facility-Administered Medications   Medication Dose Route Frequency Provider Last Rate    acetaminophen  488 mg Oral Q6H PRN Chen Blakely MD      aspirin  81 mg Oral Daily Chen Blakely MD      atorvastatin  10 mg Oral Daily With InSupply Sarah Pena MD      bisacodyl  10 mg Rectal Daily PRN Cecilia Villanueva DO      bumetanide  0 5 mg Oral Daily Sarah Pena MD      cefTRIAXone  1,000 mg Intravenous Q24H Abraham Baca DO 1,000 mg (21 1425)    cholecalciferol  1,000 Units Oral Daily Sarah Pena MD      clopidogrel  75 mg Oral Daily Sarah Pena MD      clotrimazole   Topical BID Ericberkley Dumont,       enoxaparin  40 mg Subcutaneous Daily Sarah Pena MD      ferrous sulfate  325 mg Oral Daily With Breakfast Sarah Pena MD      fluticasone  1 spray Each Nare Daily Sarah Pena MD      insulin glargine  6 Units Subcutaneous Daily Robley Heimlich, MD      insulin lispro  1-5 Units Subcutaneous 4x Daily (AC & HS) Laytontami Perez DO      melatonin  3 mg Oral HS Laytontami Perez, DO      metoprolol tartrate  25 mg Oral Q12H [facility] Sarah Pena MD      metroNIDAZOLE  500 mg Oral On license of [facility] Abraham Baca DO      pantoprazole  40 mg Oral BID Sarah Pena MD      polyethylene glycol  17 g Oral Daily Laytontami Perez, DO      saccharomyces boulardii  250 mg Oral BID Cecilia Villanueva,       senna-docusate sodium  2 tablet Oral HS Cecilia Villanueva DO      tamsulosin  0 4 mg Oral Daily With Zack Gibson MD       Continuous Infusions:   PRN Meds:   acetaminophen    bisacodyl    Subjective:  Patient is resting  RN reports no acute events today- no fevers, pain or rash reported  Pt is tolerating current antibiotic therapy       Objective:  Vitals:  Temp:  [97 6 °F (36 4 °C)-97 8 °F (36 6 °C)] 97 8 °F (36 6 °C)  HR:  [86-98] 98  Resp:  [19-22] 19  BP: (108-116)/(55-58) 108/55  SpO2:  [96 %-98 %] 96 %  Temp (24hrs), Av 7 °F (36 5 °C), Min:97 6 °F (36 4 °C), Max:97 8 °F (36 6 °C)  Current: Temperature: 97 8 °F (36 6 °C)  Physical Exam:   General Appearance:  Elderly man, resting in bed, no acute distress   ENT: Oropharynx moist without lesions   Lungs:   Clear to auscultation bilaterally; respirations unlabored   Heart:  S1, S2, RRR, no murmur   Abdomen:   Soft, non-tender, non-distended   : Suprapubic catheter present   Extremities: No distal leg edema b/l, LT BKA noted   Neurologic: No acute defictis, GARCIA x 4   Skin: No rash  RT 2nd toe dressing clean, dry, intact     Labs, Cultures, Imaging, & Other studies:   All pertinent labs, cultures and imaging studies were personally reviewed  Results from last 7 days   Lab Units 07/03/21  0540 07/02/21  0625 07/01/21  0531   WBC Thousand/uL 9 09 8 68 7 88   HEMOGLOBIN g/dL 10 6* 11 2* 10 4*   PLATELETS Thousands/uL 320 317 306     Results from last 7 days   Lab Units 07/03/21  0540 07/02/21  0625 06/30/21  0437   SODIUM mmol/L 135* 138 137   POTASSIUM mmol/L 4 0 3 9 3 5   CHLORIDE mmol/L 104 105 105   CO2 mmol/L 26 26 26   BUN mg/dL 11 11 12   CREATININE mg/dL 0 86 0 80 0 88   EGFR ml/min/1 73sq m 75 78 75   CALCIUM mg/dL 8 8 8 7 8 7     Results from last 7 days   Lab Units 06/26/21  1916   CRP mg/L 18 4*     Imaging Studies:   6/26/21 RT foot XR: 2nd mid phalanx, 2nd distal phalanx osteomyelitis suspected  Image reviewed in PACS      6/27/21 Arterial duplex RLE: Diffuse disease noted throughout the femoropopliteal arteries without  significant focal stenosis  Evidence suggestive of Tibioperoneal occlusive  disease

## 2021-07-03 NOTE — PROGRESS NOTES
[address] [facility]  Progress Note - Sotero Mccurdy 3/31/1929, 80 y o  male MRN: [id number]  Unit/Bed#: [facility] 320-01 Encounter: [id number]  Primary Care Provider: Nic Zamudio MD   Date and time admitted to hospital: 6/26/2021  4:30 PM    Dark urine  Assessment & Plan  Noted in suprapubic cathter bag  UA unreliable due to interfering substance  Received IV hydration  Today urine appeared lighter   Continue to monitor vitals    Constipation  Assessment & Plan  Unknown last bowel movement, patient reported 1 bowel movement yesterday however nothing charted  · Miralax 17 g daily   · Senokot daily  · Dulcolax 10 mg suppository PRN      Actinic dermatitis  Assessment & Plan  Attending admitted noted on admission including scald and different areas mainly bilateral groin and right lower extremity  · Clotrimazole cream  · Wound care consult    Hearing difficulty of both ears  Assessment & Plan  Hearing difficulty noted on admission most likely presbycusis  Anemia  Assessment & Plan  History of anemia, hemoglobin baseline 11-13, hemoglobin stable  · Continue home ferrous sulfate 325 ; will give every other day instead of daily while inpatient  · Bowel regimen daily  · May need MMA, folate, iron panel as outpatient  · Continue to monitor daily CBC      Hypertension  Assessment & Plan  History of hypertension, blood pressure well controlled on admission 130s over 60s  · Continue home Lopressor 25 b i d   · Continue Bumex 0 5 mg daily - diastolic heart failure  · Monitor vitals    Diastolic heart failure (HCC)  Assessment & Plan  Wt Readings from Last 3 Encounters:   07/02/21 62 kg (136 lb 11 oz)   03/10/20 77 1 kg (170 lb)   09/10/19 77 1 kg (170 lb)     History of chronic diastolic heart failure, right lower extremity grade 1 edema on admission, denies shortness of breath, CTAB on admission  Plan  · Continue home Lopressor 25 b i d   · Continue home Bumex 0 5 mg q d    · Daily in/out  · Cardiac diet, Na <2G and <2L fluid     Type 2 diabetes mellitus, with long-term current use of insulin [facility])  Assessment & Plan  Lab Results   Component Value Date    HGBA1C 6 9 (H) 06/26/2021       Recent Labs     07/01/21  1554 07/01/21  2105 07/02/21  0647 07/02/21  1127   POCGLU 161* 152* 95 183*       Blood Sugar Average: Last 72 hrs:  (P) 119 [id number]   Relatively controlled H A1c 6 9  Home medications include Lantus 12 units at noon, and metformin 500  · Hold metformin while inpatient  · Decreased Lantus to 6 units in setting of decreased oral intake and sugars in 's  · Monitor glucose and adjust as needed    BPH (benign prostatic hyperplasia)  Assessment & Plan  Known history of large prostate, came with suprapubic Hays in place  · Continue home Flomax 0 4 mg daily  · Consider holding Myrbetriq given the suprapubic Hays in place    Peripheral vascular disease in diabetes mellitus [facility])  Assessment & Plan  S/p L BKA  6/27 Lower limb arterial duplex - Diffuse disease noted throughout the femoropopliteal arteries without  significant focal stenosis  Evidence suggestive of Tibioperoneal occlusive disease  Ankle/Brachial index: Non-compressible  Metatarsal pressure and Great toe pressure unobtainable secondary to attenuated PPG waveform  PVR/ PPG tracings are dampened  · Continue ASA 81 mg daily  · Continue Plavix 75 mg daily  · See 2nd right toe ulcer assessment and plan    History of coronary artery disease  Assessment & Plan  History of coronary artery disease, denies any chest pain or palpitation on admission    Plan  · Continue aspirin 81 mg daily  · Continue Plavix 7 mg daily  · Continue atorvastatin 10 mg daily    Dementia without behavioral disturbance (HCC)  Assessment & Plan  History of dementia without behavioral disturbance, old history of stroke, AO x1 to self, baseline and stable  His niece, Semaj Marroquin, is POA   Contact at [phone]     * Right second toe ulcer [facility])  Assessment & Plan  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 confirmed by foot x-ray  Leukocytosis on admission 11 5, now 8 46  CRP of 18 4, ESR 59 on admission  Patient is afebrile, no tachycardia no tachypnea, blood pressure WNL  · Podiatry following, appreciate recommendations  Patient and POA Shyann Lr) leaning toward non-surgical treatment  · ID consulted, recommending holding antibiotic therapy for 48-72 hours and biopsy of the right 2nd toe ulcer to help guide and optimized therapy  · Culture of toe ulcer taken 7/2 -> f/u results to help guide antibiotic treatment, results pending  · Patient started on empiric ceftriaxone and Flagyl as per ID recommendations  · 7/3/21 -- Patient will likely ineed a picc line for long-term antibiotic upon discharge to SNF -- d/w POA regarding that and she is agreeable  · Daily CBC  · Monitor vitals  · Wound care  · P r n  Tylenol      Hyperlipidemia-resolved as of 7/3/2021  Assessment & Plan  Most recent lipid panel 6/27 Chol 127, TG 88, HDL 54, LDL 55  home medication Lipitor 10 mg daily, history of CAD, and peripheral vascular disease  · Continue on home Lipitor 10 mg daily for now  · Continue home aspirin Plavix          VTE Pharmacologic Prophylaxis:     Moderate Risk (Score 3-4) - Pharmacological DVT Prophylaxis Ordered: Enoxaparin (Lovenox)  Mechanical VTE Prophylaxis in Place: Yes    Patient Centered Rounds: Done    Discussions with Specialists or Other Care Team Provider: ID pending recommendations    Education and Discussions with Family / Patient: Updated  (Shantal Servin) via phone  Current Length of Stay: 7 day(s)    Current Patient Status: Inpatient     Discharge Plan / Estimated Discharge Date: TBD    Code Status: Level 3 - DNAR and DNI      Subjective:   Patient seen and examined at bedside  Patient not report of any acute overnight events  Continues to be at baseline in terms of dementia   Remained afebriile  Objective:     Vitals:   Temp (24hrs), Av 6 °F (36 4 °C), Min:97 5 °F (36 4 °C), Max:97 8 °F (36 6 °C)    Temp:  [97 5 °F (36 4 °C)-97 8 °F (36 6 °C)] 97 8 °F (36 6 °C)  HR:  [85-98] 98  Resp:  [17-22] 19  BP: (108-116)/(55-58) 108/55  SpO2:  [96 %-98 %] 96 %  Body mass index is 22 75 kg/m²  Input and Output Summary (last 24 hours): Intake/Output Summary (Last 24 hours) at 7/3/2021 1318  Last data filed at 7/3/2021 1300  Gross per 24 hour   Intake 465 ml   Output 1450 ml   Net -985 ml       Physical Exam:     Physical Exam  Constitutional:       General: He is not in acute distress  HENT:      Head: Normocephalic  Comments: Multiple skin breakdowns noted     Mouth/Throat:      Mouth: Mucous membranes are moist    Eyes:      Extraocular Movements: Extraocular movements intact  Conjunctiva/sclera: Conjunctivae normal    Cardiovascular:      Rate and Rhythm: Normal rate and regular rhythm  Pulses: Normal pulses  Pulmonary:      Effort: No respiratory distress  Abdominal:      General: Abdomen is flat  Palpations: Abdomen is soft  Musculoskeletal:      Right lower leg: No edema  Left lower leg: No edema  Skin:     General: Skin is warm  Capillary Refill: Capillary refill takes less than 2 seconds  Comments: Multiple skin bruises breakdowns noted   Neurological:      Mental Status: He is alert  Mental status is at baseline     Psychiatric:      Comments: Unable to assess            Additional Data:     Labs:  Results from last 7 days   Lab Units 21  0540   WBC Thousand/uL 9 09   HEMOGLOBIN g/dL 10 6*   HEMATOCRIT % 33 0*   PLATELETS Thousands/uL 320   NEUTROS PCT % 70   LYMPHS PCT % 21   MONOS PCT % 6   EOS PCT % 3     Results from last 7 days   Lab Units 21  0540   SODIUM mmol/L 135*   POTASSIUM mmol/L 4 0   CHLORIDE mmol/L 104   CO2 mmol/L 26   BUN mg/dL 11   CREATININE mg/dL 0 86   ANION GAP mmol/L 5   CALCIUM mg/dL 8 8   GLUCOSE RANDOM mg/dL 89         Results from last 7 days   Lab Units 07/03/21  1123 07/03/21  0715 07/02/21  2050 07/02/21  1605 07/02/21  1127 07/02/21  0647 07/01/21  2105 07/01/21  1554 07/01/21  1107 07/01/21  0556 06/30/21  2046 06/30/21  1634   POC GLUCOSE mg/dl 133 108 130 113 183* 95 152* 161* 117 88 106 98     Results from last 7 days   Lab Units 06/26/21  1917   HEMOGLOBIN A1C % 6 9*     Results from last 7 days   Lab Units 06/26/21  1917   LACTIC ACID mmol/L 1 6       Imaging: Reviewed radiology reports from this admission including: xray(s)    Recent Cultures (last 7 days):     Results from last 7 days   Lab Units 07/02/21  1134   GRAM STAIN RESULT  No Polys*  1+ Gram negative rods*  Rare Gram positive cocci in pairs*       Lines/Drains:  Invasive Devices     Peripheral Intravenous Line            Peripheral IV 06/30/21 Dorsal (posterior); Left Wrist 3 days          Drain            Suprapubic Catheter -- days                Telemetry:        Last 24 Hours Medication List:   Current Facility-Administered Medications   Medication Dose Route Frequency Provider Last Rate    acetaminophen  488 mg Oral Q6H PRN Ning Ware MD      aspirin  81 mg Oral Daily Ning Ware MD      atorvastatin  10 mg Oral Daily With Ahsan Bowden MD      bisacodyl  10 mg Rectal Daily PRN Maximino Ayala, DO      bumetanide  0 5 mg Oral Daily Ning Ware MD      cefTRIAXone  1,000 mg Intravenous Q24H Abraham Baca DO 1,000 mg (07/02/21 1554)    cholecalciferol  1,000 Units Oral Daily Ning Ware MD      clopidogrel  75 mg Oral Daily Ning Ware MD      clotrimazole   Topical BID Eric Dumont DO      enoxaparin  40 mg Subcutaneous Daily Ning Ware MD      ferrous sulfate  325 mg Oral Daily With Breakfast Ning Ware MD      fluticasone  1 spray Each Nare Daily Ning Ware MD      insulin glargine  6 Units Subcutaneous Daily Beau Holstein, MD      insulin lispro  1-5 Units Subcutaneous 4x Daily (AC & HS) Timbo Chaparro, DO      melatonin  3 mg Oral HS Layton Perez, DO      metoprolol tartrate  25 mg Oral Q12H [facility] Nuvia Meredith MD      metroNIDAZOLE  500 mg Oral [facility] Abraham Baca,       pantoprazole  40 mg Oral BID Nuvia Meredith MD      polyethylene glycol  17 g Oral Daily Layton Perez, DO      saccharomyces boulardii  250 mg Oral BID Timbo Shuttneha, DO      senna-docusate sodium  2 tablet Oral HS Timbo Schwarzers, DO      tamsulosin  0 4 mg Oral Daily With Keira Pettit MD          Today, Patient Was Seen By: Luli Johnson MD    ** Please Note: This note has been constructed using a voice recognition system   **

## 2021-07-04 LAB
ALBUMIN SERPL BCP-MCNC: 2.6 G/DL (ref 3.5–5)
ALP SERPL-CCNC: 127 U/L (ref 46–116)
ALT SERPL W P-5'-P-CCNC: 17 U/L (ref 12–78)
ANION GAP SERPL CALCULATED.3IONS-SCNC: 5 MMOL/L (ref 4–13)
AST SERPL W P-5'-P-CCNC: 26 U/L (ref 5–45)
BILIRUB SERPL-MCNC: 0.5 MG/DL (ref 0.2–1)
BUN SERPL-MCNC: 10 MG/DL (ref 5–25)
CALCIUM ALBUM COR SERPL-MCNC: 10 MG/DL (ref 8.3–10.1)
CALCIUM SERPL-MCNC: 8.9 MG/DL (ref 8.3–10.1)
CHLORIDE SERPL-SCNC: 104 MMOL/L (ref 100–108)
CO2 SERPL-SCNC: 27 MMOL/L (ref 21–32)
CREAT SERPL-MCNC: 0.85 MG/DL (ref 0.6–1.3)
ERYTHROCYTE [DISTWIDTH] IN BLOOD BY AUTOMATED COUNT: 12.8 % (ref 11.6–15.1)
GFR SERPL CREATININE-BSD FRML MDRD: 76 ML/MIN/1.73SQ M
GLUCOSE SERPL-MCNC: 107 MG/DL (ref 65–140)
GLUCOSE SERPL-MCNC: 109 MG/DL (ref 65–140)
GLUCOSE SERPL-MCNC: 114 MG/DL (ref 65–140)
GLUCOSE SERPL-MCNC: 128 MG/DL (ref 65–140)
GLUCOSE SERPL-MCNC: 134 MG/DL (ref 65–140)
HCT VFR BLD AUTO: 38.3 % (ref 36.5–49.3)
HGB BLD-MCNC: 12.3 G/DL (ref 12–17)
MAGNESIUM SERPL-MCNC: 2.2 MG/DL (ref 1.6–2.6)
MCH RBC QN AUTO: 30.8 PG (ref 26.8–34.3)
MCHC RBC AUTO-ENTMCNC: 32.1 G/DL (ref 31.4–37.4)
MCV RBC AUTO: 96 FL (ref 82–98)
PLATELET # BLD AUTO: 351 THOUSANDS/UL (ref 149–390)
PMV BLD AUTO: 9.8 FL (ref 8.9–12.7)
POTASSIUM SERPL-SCNC: 4.2 MMOL/L (ref 3.5–5.3)
PROT SERPL-MCNC: 7.3 G/DL (ref 6.4–8.2)
RBC # BLD AUTO: 4 MILLION/UL (ref 3.88–5.62)
SODIUM SERPL-SCNC: 136 MMOL/L (ref 136–145)
WBC # BLD AUTO: 9.14 THOUSAND/UL (ref 4.31–10.16)

## 2021-07-04 PROCEDURE — 83735 ASSAY OF MAGNESIUM: CPT | Performed by: INTERNAL MEDICINE

## 2021-07-04 PROCEDURE — 99232 SBSQ HOSP IP/OBS MODERATE 35: CPT | Performed by: INTERNAL MEDICINE

## 2021-07-04 PROCEDURE — 80053 COMPREHEN METABOLIC PANEL: CPT | Performed by: INTERNAL MEDICINE

## 2021-07-04 PROCEDURE — 85027 COMPLETE CBC AUTOMATED: CPT | Performed by: INTERNAL MEDICINE

## 2021-07-04 PROCEDURE — 82948 REAGENT STRIP/BLOOD GLUCOSE: CPT

## 2021-07-04 RX ADMIN — CEFTRIAXONE 1000 MG: 1 INJECTION, POWDER, FOR SOLUTION INTRAMUSCULAR; INTRAVENOUS at 14:20

## 2021-07-04 RX ADMIN — TAMSULOSIN HYDROCHLORIDE 0.4 MG: 0.4 CAPSULE ORAL at 16:10

## 2021-07-04 RX ADMIN — METRONIDAZOLE 500 MG: 500 TABLET ORAL at 21:33

## 2021-07-04 RX ADMIN — INSULIN GLARGINE 6 UNITS: 100 INJECTION, SOLUTION SUBCUTANEOUS at 09:15

## 2021-07-04 RX ADMIN — METRONIDAZOLE 500 MG: 500 TABLET ORAL at 13:09

## 2021-07-04 RX ADMIN — FERROUS SULFATE TAB 325 MG (65 MG ELEMENTAL FE) 325 MG: 325 (65 FE) TAB at 09:06

## 2021-07-04 RX ADMIN — PANTOPRAZOLE SODIUM 40 MG: 40 TABLET, DELAYED RELEASE ORAL at 09:06

## 2021-07-04 RX ADMIN — ENOXAPARIN SODIUM 40 MG: 40 INJECTION SUBCUTANEOUS at 09:06

## 2021-07-04 RX ADMIN — ASPIRIN 81 MG: 81 TABLET, COATED ORAL at 09:06

## 2021-07-04 RX ADMIN — DOCUSATE SODIUM AND SENNOSIDES 2 TABLET: 8.6; 5 TABLET ORAL at 21:33

## 2021-07-04 RX ADMIN — PANTOPRAZOLE SODIUM 40 MG: 40 TABLET, DELAYED RELEASE ORAL at 17:00

## 2021-07-04 RX ADMIN — FLUTICASONE PROPIONATE 1 SPRAY: 50 SPRAY, METERED NASAL at 09:06

## 2021-07-04 RX ADMIN — METRONIDAZOLE 500 MG: 500 TABLET ORAL at 06:21

## 2021-07-04 RX ADMIN — MELATONIN TAB 3 MG 3 MG: 3 TAB at 21:34

## 2021-07-04 RX ADMIN — CLOPIDOGREL BISULFATE 75 MG: 75 TABLET ORAL at 09:06

## 2021-07-04 RX ADMIN — Medication 250 MG: at 17:01

## 2021-07-04 RX ADMIN — Medication 1000 UNITS: at 09:06

## 2021-07-04 RX ADMIN — BUMETANIDE 0.5 MG: 0.5 TABLET ORAL at 09:07

## 2021-07-04 RX ADMIN — POLYETHYLENE GLYCOL 3350 17 G: 17 POWDER, FOR SOLUTION ORAL at 09:06

## 2021-07-04 RX ADMIN — Medication 250 MG: at 09:07

## 2021-07-04 RX ADMIN — CLOTRIMAZOLE: 0.01 CREAM TOPICAL at 17:01

## 2021-07-04 RX ADMIN — ATORVASTATIN CALCIUM 10 MG: 20 TABLET, FILM COATED ORAL at 16:10

## 2021-07-04 RX ADMIN — METOPROLOL TARTRATE 25 MG: 25 TABLET, FILM COATED ORAL at 09:06

## 2021-07-04 RX ADMIN — CLOTRIMAZOLE: 0.01 CREAM TOPICAL at 09:07

## 2021-07-04 RX ADMIN — METOPROLOL TARTRATE 25 MG: 25 TABLET, FILM COATED ORAL at 21:34

## 2021-07-04 NOTE — PROGRESS NOTES
Progress Note - Infectious Disease   Cindy Crockett 80 y o  male MRN: [id number]  Unit/Bed#: [facility] 320-01 Encounter: [id number]    Impression/Recommendations:   RT 2nd toe diabetic ulcer with osteomyelitis confirmed by right foot x-ray  Patient remains afebrile  Nonseptic in appearance  Elevated ESR 59, Crp 18 are noted  Patient and his POA Agustin Kahn are both declining surgical intervention including amputation right 2nd toe  They would prefer to treat conservatively with antibiotic therapy  S/p biopsy on 7/2    o Follow tissue cx from biopsy to help guide and optimize antibiotic therapy: 2+ GNR noted  o Continue ceftriaxone 1g IV Q 24 hours and metronidazole 500 mg p o  Q 8 hours pending culture results  o Anticipate prolonged course of IV antibiotic therapy, suggest PICC line placement  o Antibiotic therapy alone is a suboptimal treatment approach for osteomyelitis  Without surgical intervention, no guarantees for cure of infection were provided to the patient  o Local wound care per Podiatry service  o Monitor clinical response, temperature curve   Leukocytosis: noted at the time of admission, but has since normalized  Lactate is WNL  o Monitor clinical response, temperature curve   Diabetes mellitus type 2 with hyperglycemia: noted HbA1C of 6 9 on 6/26/21   o Glycemic control per primary team   Peripheral arterial disease:  Status post left BKA  Arterial duplex right lower extremity demonstrates tibioperoneal occlusive disease without significant focal stenosis  o Remains on ASA, clopidogrel per primary team   History of diastolic heart failure   o Management per primary team   Advanced age    Thank you for allowing me to participate in the care of this patient  The ID service will follow- Dr Ryan Gupta will assume care of the patient from tomorrow      Antibiotics: ceftriaxone, metrondiazole since 7/2  Scheduled Meds:  Current Facility-Administered Medications   Medication Dose Route Frequency Provider Last Rate    acetaminophen  488 mg Oral Q6H PRN Sandhya Wilhelm MD      aspirin  81 mg Oral Daily Sandhya Wilhelm MD      atorvastatin  10 mg Oral Daily With David Flores MD      bisacodyl  10 mg Rectal Daily PRN Eino Naas, DO      bumetanide  0 5 mg Oral Daily Sandhya Wilhelm MD      cefTRIAXone  1,000 mg Intravenous Q24H Abraham Baca, DO 1,000 mg (21 1425)    cholecalciferol  1,000 Units Oral Daily Sandhya Wilhelm MD      clopidogrel  75 mg Oral Daily Sandhya Wilhelm MD      clotrimazole   Topical BID Eric Aiad, DO      enoxaparin  40 mg Subcutaneous Daily Sandhya Wilhelm MD      ferrous sulfate  325 mg Oral Daily With Breakfast Sandhya Wilhelm MD      fluticasone  1 spray Each Nare Daily Sandhya Wilhelm MD      insulin glargine  6 Units Subcutaneous Daily Phill Arnett MD      insulin lispro  1-5 Units Subcutaneous 4x Daily (AC & HS) Layton Perez, DO      melatonin  3 mg Oral HS Layton Perez, DO      metoprolol tartrate  25 mg Oral Q12H [address] Sandhya Wilhelm MD      metroNIDAZOLE  500 mg Oral [facility] Abraham Baca,       pantoprazole  40 mg Oral BID Sandhya Wilhelm MD      polyethylene glycol  17 g Oral Daily Layton Perez, DO      saccharomyces boulardii  250 mg Oral BID Eino Naas, DO      senna-docusate sodium  2 tablet Oral HS Eino Naas, DO      tamsulosin  0 4 mg Oral Daily With David Flores MD       Continuous Infusions:   PRN Meds:   acetaminophen    bisacodyl    Subjective:  Patient offers no complaints  He denies fevers, RT foot pain, shortness of breath, vomiting, diarrhea or rash  Pt is tolerating current antibiotic therapy       Objective:  Vitals:  Temp:  [97 4 °F (36 3 °C)-98 9 °F (37 2 °C)] 98 °F (36 7 °C)  HR:  [83-90] 87  Resp:  [18-20] 18  BP: (106-114)/(53-72) 114/56  SpO2:  [96 %-98 %] 96 %  Temp (24hrs), Av 1 °F (36 7 °C), Min:97 4 °F (36 3 °C), Max:98 9 °F (37 2 °C)  Current: Temperature: 98 °F (36 7 °C)  Physical Exam:   General Appearance: Elderly man, resting in bed, no acute distress   ENT: Oropharynx moist without lesions   Lungs:   Clear to auscultation bilaterally; respirations unlabored   Heart:  S1, S2, RRR, no murmur   Abdomen:   Soft, non-tender, non-distended   : Suprapubic catheter present   Extremities: No distal leg edema b/l, LT BKA noted   Neurologic: No acute defictis, GARCIA x 4   Skin: No rash  RT 2nd toe dressing clean, dry, intact     Labs, Cultures, Imaging, & Other studies:   All pertinent labs, cultures and imaging studies were personally reviewed  Results from last 7 days   Lab Units 07/04/21  0654 07/03/21  0540 07/02/21  0625   WBC Thousand/uL 9 14 9 09 8 68   HEMOGLOBIN g/dL 12 3 10 6* 11 2*   PLATELETS Thousands/uL 351 320 317     Results from last 7 days   Lab Units 07/04/21  0654 07/03/21  0540 07/02/21  0625   SODIUM mmol/L 136 135* 138   POTASSIUM mmol/L 4 2 4 0 3 9   CHLORIDE mmol/L 104 104 105   CO2 mmol/L 27 26 26   BUN mg/dL 10 11 11   CREATININE mg/dL 0 85 0 86 0 80   EGFR ml/min/1 73sq m 76 75 78   CALCIUM mg/dL 8 9 8 8 8 7   AST U/L 26  --   --    ALT U/L 17  --   --    ALK PHOS U/L 127*  --   --      Imaging Studies:   6/26/21 RT foot XR: 2nd mid phalanx, 2nd distal phalanx osteomyelitis suspected  Image reviewed in PACS      6/27/21 Arterial duplex RLE: Diffuse disease noted throughout the femoropopliteal arteries without  significant focal stenosis  Evidence suggestive of Tibioperoneal occlusive  disease

## 2021-07-04 NOTE — PROGRESS NOTES
INTERNAL MEDICINE RESIDENCY PROGRESS NOTE     PATIENT INFORMATION     Name: Emerita Lozano   Age & Sex: 80 y o  male   MRN: [id number]  Hospital Stay Days: 8  Unit/Bed#: [facility] 320-01   Encounter: [id number]  Team: SOD Team B     ASSESSMENT/PLAN     Principal Problem:    Right second toe ulcer ([facility] [place] [facility] )  Active Problems:    Dementia without behavioral disturbance ([place] [id number] )    History of coronary artery disease    Peripheral vascular disease in diabetes mellitus ([place] [address] )    BPH (benign prostatic hyperplasia)    Type 2 diabetes mellitus, with long-term current use of insulin ([facility])    Diastolic heart failure ([place] [address] )    Hypertension    Anemia    Hearing difficulty of both ears    Actinic dermatitis    Constipation    Dark urine    Dark urine  Assessment & Plan  Noted in suprapubic cathter bag  UA unreliable due to interfering substance  Received IV hydration  Today urine appeared lighter   Continue to monitor vitals    Constipation  Assessment & Plan  Unknown last bowel movement, patient reported 1 bowel movement yesterday however nothing charted  · Miralax 17 g daily   · Senokot daily  · Dulcolax 10 mg suppository PRN      Actinic dermatitis  Assessment & Plan  Attending admitted noted on admission including scald and different areas mainly bilateral groin and right lower extremity  · Clotrimazole cream  · Wound care consult    Hearing difficulty of both ears  Assessment & Plan  Hearing difficulty noted on admission most likely presbycusis         Anemia  Assessment & Plan  History of anemia, hemoglobin baseline 11-13, hemoglobin stable  · Continue home ferrous sulfate 325 ; will give every other day instead of daily while inpatient  · Bowel regimen daily  · May need MMA, folate, iron panel as outpatient  · Continue to monitor daily CBC      Hypertension  Assessment & Plan  History of hypertension, blood pressure well controlled on admission 130s over 60s  · Continue home Lopressor 25 b i d   · Continue Bumex 0 5 mg daily - diastolic heart failure  · Monitor vitals    Diastolic heart failure (HCC)  Assessment & Plan  Wt Readings from Last 3 Encounters:   07/04/21 62 9 kg (138 lb 10 7 oz)   03/10/20 77 1 kg (170 lb)   09/10/19 77 1 kg (170 lb)     History of chronic diastolic heart failure, right lower extremity grade 1 edema on admission, denies shortness of breath, CTAB on admission  Plan  · Continue home Lopressor 25 b i d   · Continue home Bumex 0 5 mg q d  · Daily in/out  · Cardiac diet, Na <2G and <2L fluid     Type 2 diabetes mellitus, with long-term current use of insulin [facility])  Assessment & Plan  Lab Results   Component Value Date    HGBA1C 6 9 (H) 06/26/2021       Recent Labs     07/03/21  1646 07/03/21  2101 07/04/21  0656 07/04/21  1141   POCGLU 135 133 109 128       Blood Sugar Average: Last 72 hrs:  (P) 127 5   Relatively controlled H A1c 6 9  Home medications include Lantus 12 units at noon, and metformin 500  · Hold metformin while inpatient  · Decreased Lantus to 6 units in setting of decreased oral intake and sugars in 's  · Monitor glucose and adjust as needed    BPH (benign prostatic hyperplasia)  Assessment & Plan  Known history of large prostate, came with suprapubic Hays in place  · Continue home Flomax 0 4 mg daily  · Consider holding Myrbetriq given the suprapubic Hays in place    Peripheral vascular disease in diabetes mellitus [facility])  Assessment & Plan  S/p L BKA  6/27 Lower limb arterial duplex - Diffuse disease noted throughout the femoropopliteal arteries without  significant focal stenosis  Evidence suggestive of Tibioperoneal occlusive disease  Ankle/Brachial index: Non-compressible  Metatarsal pressure and Great toe pressure unobtainable secondary to attenuated PPG waveform  PVR/ PPG tracings are dampened    · Continue ASA 81 mg daily  · Continue Plavix 75 mg daily  · See 2nd right toe ulcer assessment and plan    History of coronary artery disease  Assessment & Plan  History of coronary artery disease, denies any chest pain or palpitation on admission    Plan  · Continue aspirin 81 mg daily  · Continue Plavix 7 mg daily  · Continue atorvastatin 10 mg daily    Dementia without behavioral disturbance (HCC)  Assessment & Plan  History of dementia without behavioral disturbance, old history of stroke, AO x1 to self, baseline and stable  His niece, Antony Taveras, is POA  Contact at [phone]     * Right second toe ulcer [facility])  Assessment & Plan  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 confirmed by foot x-ray  Leukocytosis on admission 11 5, now 8 46  CRP of 18 4, ESR 59 on admission  Patient is afebrile, no tachycardia no tachypnea, blood pressure WNL  · Podiatry following, appreciate recommendations  Patient and POA Shiela Key) leaning toward non-surgical treatment  · ID consulted, recommending holding antibiotic therapy for 48-72 hours and biopsy of the right 2nd toe ulcer to help guide and optimized therapy  · Culture of toe ulcer taken 7/2 -> f/u results to help guide antibiotic treatment, results pending  · Patient started on empiric ceftriaxone and Flagyl as per ID recommendations  · 7/3/21 -- Patient will likely ineed a picc line for long-term antibiotic upon discharge to SNF -- d/w POA regarding that and she is agreeable -> plan for PICC placement 7/5  · Daily CBC  · Monitor vitals  · Wound care  · P r n  Tylenol        Disposition: Inpatient     SUBJECTIVE     Patient seen and examined  No acute events overnight  Today overall the patient is feeling well and had no acute complaints  Patient will likely require PICC access for long-term antibiotic use until DC to an SNF  His POA was agreeable to this yesterday        OBJECTIVE     Vitals:    07/03/21 2100 07/04/21 0028 07/04/21 0600 07/04/21 0700   BP: 109/53 106/72  114/56   BP Location:  Left arm  Left arm   Pulse:  90  87   Resp:  18  18   Temp:  (!) 97 4 °F (36 3 °C)  98 °F (36 7 °C)   TempSrc:  Axillary  Oral SpO2:  98%  96%   Weight:   62 9 kg (138 lb 10 7 oz)    Height:          Temperature:   Temp (24hrs), Av 1 °F (36 7 °C), Min:97 4 °F (36 3 °C), Max:98 9 °F (37 2 °C)    Temperature: 98 °F (36 7 °C)  Intake & Output:  I/O       / 0701 - / 0700 07/ 07 -  0700 / 07 -  0700    P  O  240 275     Total Intake(mL/kg) 240 (3 9) 275 (4 4)     Urine (mL/kg/hr) 1450 (1) 2100 (1 4)     Total Output 1450 2100     Net -1210 -1825                  Intake/Output Summary (Last 24 hours) at 2021 1314  Last data filed at 2021 1312  Gross per 24 hour   Intake 751 ml   Output 2100 ml   Net -1349 ml     I/O this shift: In: 701 [P O :701]  Out: -   Weights:   IBW (Ideal Body Weight): 61 5 kg    Body mass index is 23 08 kg/m²  Weight (last 2 days)     Date/Time   Weight    21 06   62 9 (138 67)    21 0600   62 (136 69)            Physical Exam  Constitutional:       Appearance: He is well-developed  HENT:      Head: Normocephalic and atraumatic  Nose: Nose normal    Eyes:      General:         Right eye: No discharge  Left eye: No discharge  Conjunctiva/sclera: Conjunctivae normal       Pupils: Pupils are equal, round, and reactive to light  Neck:      Thyroid: No thyromegaly  Cardiovascular:      Rate and Rhythm: Normal rate and regular rhythm  Heart sounds: Normal heart sounds  No murmur heard  No friction rub  No gallop  Pulmonary:      Effort: Pulmonary effort is normal  No respiratory distress  Breath sounds: Normal breath sounds  No stridor  No wheezing or rales  Chest:      Chest wall: No tenderness  Abdominal:      General: Bowel sounds are normal  There is no distension  Palpations: Abdomen is soft  There is no mass  Tenderness: There is no abdominal tenderness  There is no guarding or rebound  Musculoskeletal:         General: No tenderness or deformity  Right lower leg: No edema  Left lower leg: No edema  Lymphadenopathy:      Cervical: No cervical adenopathy  Skin:     General: Skin is warm and dry  Comments: Ulcer on RLL  Multiple scaly lesions on scalp  Neurological:      Mental Status: He is alert  LABORATORY DATA     Labs: I have personally reviewed pertinent reports  Results from last 7 days   Lab Units 07/04/21  0654 07/03/21  0540 07/02/21  0625 07/01/21  0531   WBC Thousand/uL 9 14 9 09 8 68 7 88   HEMOGLOBIN g/dL 12 3 10 6* 11 2* 10 4*   HEMATOCRIT % 38 3 33 0* 34 4* 32 5*   PLATELETS Thousands/uL 351 320 317 306   NEUTROS PCT %  --  70 66 70   MONOS PCT %  --  6 7 7      Results from last 7 days   Lab Units 07/04/21  0654 07/03/21  0540 07/02/21  0625   POTASSIUM mmol/L 4 2 4 0 3 9   CHLORIDE mmol/L 104 104 105   CO2 mmol/L 27 26 26   BUN mg/dL 10 11 11   CREATININE mg/dL 0 85 0 86 0 80   CALCIUM mg/dL 8 9 8 8 8 7   ALK PHOS U/L 127*  --   --    ALT U/L 17  --   --    AST U/L 26  --   --      Serum creatinine: 0 85 mg/dL 07/04/21 0654  Estimated creatinine clearance: 48 2 mL/min   Results from last 7 days   Lab Units 07/04/21  0654   MAGNESIUM mg/dL 2 2                        IMAGING & DIAGNOSTIC TESTING     Radiology Results: I have personally reviewed pertinent reports  XR foot 3+ views RIGHT    Result Date: 6/27/2021  Impression: 2nd mid phalanx, 2nd distal phalanx osteomyelitis suspected  The study was marked in [facility] for immediate notification  Workstation performed: [id number]     Other Diagnostic Testing: I have personally reviewed pertinent reports        ACTIVE MEDICATIONS     Current Facility-Administered Medications   Medication Dose Route Frequency    acetaminophen (TYLENOL) tablet 488 mg  488 mg Oral Q6H PRN    aspirin (ECOTRIN LOW STRENGTH) EC tablet 81 mg  81 mg Oral Daily    atorvastatin (LIPITOR) tablet 10 mg  10 mg Oral Daily With Dinner    bisacodyl (DULCOLAX) rectal suppository 10 mg  10 mg Rectal Daily PRN    bumetanide (BUMEX) tablet 0 5 mg  0 5 mg Oral Daily    cefTRIAXone (ROCEPHIN) 1,000 mg in dextrose 5 % 50 mL IVPB  1,000 mg Intravenous Q24H    cholecalciferol (VITAMIN D3) tablet 1,000 Units  1,000 Units Oral Daily    clopidogrel (PLAVIX) tablet 75 mg  75 mg Oral Daily    clotrimazole (LOTRIMIN) 1 % cream   Topical BID    enoxaparin (LOVENOX) subcutaneous injection 40 mg  40 mg Subcutaneous Daily    ferrous sulfate tablet 325 mg  325 mg Oral Daily With Breakfast    fluticasone (FLONASE) 50 mcg/act nasal spray 1 spray  1 spray Each Nare Daily    insulin glargine (LANTUS) subcutaneous injection 6 Units 0 06 mL  6 Units Subcutaneous Daily    insulin lispro (HumaLOG) 100 units/mL subcutaneous injection 1-5 Units  1-5 Units Subcutaneous 4x Daily (AC & HS)    melatonin tablet 3 mg  3 mg Oral HS    metoprolol tartrate (LOPRESSOR) tablet 25 mg  25 mg Oral Q12H AGGIE    metroNIDAZOLE (FLAGYL) tablet 500 mg  500 mg Oral Q8H [address]    pantoprazole (PROTONIX) EC tablet 40 mg  40 mg Oral BID    polyethylene glycol (MIRALAX) packet 17 g  17 g Oral Daily    saccharomyces boulardii (FLORASTOR) capsule 250 mg  250 mg Oral BID    senna-docusate sodium (SENOKOT S) 8 6-50 mg per tablet 2 tablet  2 tablet Oral HS    tamsulosin (FLOMAX) capsule 0 4 mg  0 4 mg Oral Daily With Dinner     VTE Pharmacologic Prophylaxis: Enoxaparin (Lovenox)  VTE Mechanical Prophylaxis: sequential compression device    ==  Mary Wynn MD  IM Resident, PGY-2  [facility]

## 2021-07-04 NOTE — ASSESSMENT & PLAN NOTE
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 confirmed by foot x-ray  Leukocytosis on admission 11 5, now WNL  CRP of 18 4, ESR 59 on admission  Patient is afebrile, no tachycardia no tachypnea, blood pressure WNL  7/8 Arteriogram - "Successful angioplasty of the tibioperoneal trunk and anterior tibial artery due to severe stenoses  3 vessel run-off now present "    · Podiatry following, appreciate recommendations  Patient and SOLIS Mills) now agreeable to plan for amputation of right 2nd toe  Ever Angelina would like a call before the amputation  · ID consulted  · Culture of toe ulcer taken 7/2 -> 4+ pseudomonas, 2+ MRSA  · Patient started on vancomycin/cefepime as per ID recommendations  · Previous plan for IV abx treatment changed, PICC placement cancelled, now proceeding with arteriogram and amputation, will continue IV antibiotics until after amputation  · Daily CBC  · Monitor vitals  · Wound care  · P r n   Tylenol    Amputation 7/11/21

## 2021-07-04 NOTE — ASSESSMENT & PLAN NOTE
Lab Results   Component Value Date    HGBA1C 6 9 (H) 06/26/2021       Recent Labs     07/09/21  1155 07/09/21  1549 07/09/21  2115 07/10/21  0711   POCGLU 199* 231* 227* 151*       Blood Sugar Average: Last 72 hrs:  (P) 419 [id number]   Relatively controlled H A1c 6 9  Home medications include Lantus 12 units at noon, and metformin 500  · Hold metformin while inpatient  · Decreased Lantus to 6 units in setting of decreased oral intake and sugars in 's    · Monitor glucose and adjust as needed

## 2021-07-04 NOTE — ASSESSMENT & PLAN NOTE
Unknown last bowel movement on admission, most recent bowel movement 7/8    · Miralax 17 g daily   · Senokot daily  · Dulcolax 10 mg suppository PRN

## 2021-07-04 NOTE — ASSESSMENT & PLAN NOTE
Wt Readings from Last 3 Encounters:   07/09/21 64 3 kg (141 lb 12 1 oz)   03/10/20 77 1 kg (170 lb)   09/10/19 77 1 kg (170 lb)     History of chronic diastolic heart failure, right lower extremity grade 1 edema on admission, denies shortness of breath, CTAB on admission  Plan  · Continue home Lopressor 25 b i d   · Continue home Bumex 0 5 mg q d    · Daily in/out  · Cardiac diet, Na <2G and <2L fluid

## 2021-07-04 NOTE — ASSESSMENT & PLAN NOTE
Noted in suprapubic cathter bag  UA unreliable due to interfering substance  Received IV hydration  Appears to have resolved  Continue to monitor vitals

## 2021-07-04 NOTE — ASSESSMENT & PLAN NOTE
History of dementia without behavioral disturbance, old history of stroke, AO x1 to self, baseline and stable  His niece, Daria Stevens, is POA   Contact at [phone]

## 2021-07-05 LAB
ANION GAP SERPL CALCULATED.3IONS-SCNC: 4 MMOL/L (ref 4–13)
BACTERIA TISS AEROBE CULT: ABNORMAL
BUN SERPL-MCNC: 10 MG/DL (ref 5–25)
CALCIUM SERPL-MCNC: 9 MG/DL (ref 8.3–10.1)
CHLORIDE SERPL-SCNC: 102 MMOL/L (ref 100–108)
CO2 SERPL-SCNC: 30 MMOL/L (ref 21–32)
CREAT SERPL-MCNC: 0.81 MG/DL (ref 0.6–1.3)
ERYTHROCYTE [DISTWIDTH] IN BLOOD BY AUTOMATED COUNT: 12.8 % (ref 11.6–15.1)
GFR SERPL CREATININE-BSD FRML MDRD: 77 ML/MIN/1.73SQ M
GLUCOSE SERPL-MCNC: 112 MG/DL (ref 65–140)
GLUCOSE SERPL-MCNC: 113 MG/DL (ref 65–140)
GLUCOSE SERPL-MCNC: 144 MG/DL (ref 65–140)
GLUCOSE SERPL-MCNC: 148 MG/DL (ref 65–140)
GLUCOSE SERPL-MCNC: 194 MG/DL (ref 65–140)
GRAM STN SPEC: ABNORMAL
HCT VFR BLD AUTO: 36.4 % (ref 36.5–49.3)
HGB BLD-MCNC: 11.9 G/DL (ref 12–17)
MCH RBC QN AUTO: 31 PG (ref 26.8–34.3)
MCHC RBC AUTO-ENTMCNC: 32.7 G/DL (ref 31.4–37.4)
MCV RBC AUTO: 95 FL (ref 82–98)
PLATELET # BLD AUTO: 348 THOUSANDS/UL (ref 149–390)
PMV BLD AUTO: 9.8 FL (ref 8.9–12.7)
POTASSIUM SERPL-SCNC: 4 MMOL/L (ref 3.5–5.3)
RBC # BLD AUTO: 3.84 MILLION/UL (ref 3.88–5.62)
SODIUM SERPL-SCNC: 136 MMOL/L (ref 136–145)
WBC # BLD AUTO: 8.48 THOUSAND/UL (ref 4.31–10.16)

## 2021-07-05 PROCEDURE — 82948 REAGENT STRIP/BLOOD GLUCOSE: CPT

## 2021-07-05 PROCEDURE — 85027 COMPLETE CBC AUTOMATED: CPT | Performed by: STUDENT IN AN ORGANIZED HEALTH CARE EDUCATION/TRAINING PROGRAM

## 2021-07-05 PROCEDURE — 99222 1ST HOSP IP/OBS MODERATE 55: CPT | Performed by: PODIATRIST

## 2021-07-05 PROCEDURE — NC001 PR NO CHARGE: Performed by: INTERNAL MEDICINE

## 2021-07-05 PROCEDURE — 99233 SBSQ HOSP IP/OBS HIGH 50: CPT | Performed by: INTERNAL MEDICINE

## 2021-07-05 PROCEDURE — 80048 BASIC METABOLIC PNL TOTAL CA: CPT | Performed by: STUDENT IN AN ORGANIZED HEALTH CARE EDUCATION/TRAINING PROGRAM

## 2021-07-05 RX ADMIN — CLOTRIMAZOLE 1 APPLICATION: 0.01 CREAM TOPICAL at 17:59

## 2021-07-05 RX ADMIN — POLYETHYLENE GLYCOL 3350 17 G: 17 POWDER, FOR SOLUTION ORAL at 09:07

## 2021-07-05 RX ADMIN — BUMETANIDE 0.5 MG: 0.5 TABLET ORAL at 09:08

## 2021-07-05 RX ADMIN — METOPROLOL TARTRATE 25 MG: 25 TABLET, FILM COATED ORAL at 21:52

## 2021-07-05 RX ADMIN — CLOPIDOGREL BISULFATE 75 MG: 75 TABLET ORAL at 09:07

## 2021-07-05 RX ADMIN — TAMSULOSIN HYDROCHLORIDE 0.4 MG: 0.4 CAPSULE ORAL at 15:57

## 2021-07-05 RX ADMIN — VANCOMYCIN HYDROCHLORIDE 750 MG: 750 INJECTION, SOLUTION INTRAVENOUS at 22:33

## 2021-07-05 RX ADMIN — Medication 250 MG: at 17:59

## 2021-07-05 RX ADMIN — ATORVASTATIN CALCIUM 10 MG: 20 TABLET, FILM COATED ORAL at 15:57

## 2021-07-05 RX ADMIN — Medication 1000 UNITS: at 09:07

## 2021-07-05 RX ADMIN — METRONIDAZOLE 500 MG: 500 TABLET ORAL at 05:15

## 2021-07-05 RX ADMIN — ENOXAPARIN SODIUM 40 MG: 40 INJECTION SUBCUTANEOUS at 09:07

## 2021-07-05 RX ADMIN — ASPIRIN 81 MG: 81 TABLET, COATED ORAL at 09:07

## 2021-07-05 RX ADMIN — VANCOMYCIN HYDROCHLORIDE 1250 MG: 10 INJECTION, POWDER, LYOPHILIZED, FOR SOLUTION INTRAVENOUS at 10:27

## 2021-07-05 RX ADMIN — METOPROLOL TARTRATE 25 MG: 25 TABLET, FILM COATED ORAL at 09:07

## 2021-07-05 RX ADMIN — Medication 250 MG: at 09:08

## 2021-07-05 RX ADMIN — FLUTICASONE PROPIONATE 1 SPRAY: 50 SPRAY, METERED NASAL at 09:08

## 2021-07-05 RX ADMIN — DOCUSATE SODIUM AND SENNOSIDES 2 TABLET: 8.6; 5 TABLET ORAL at 21:36

## 2021-07-05 RX ADMIN — CEFEPIME HYDROCHLORIDE 2000 MG: 2 INJECTION, POWDER, FOR SOLUTION INTRAVENOUS at 21:39

## 2021-07-05 RX ADMIN — PANTOPRAZOLE SODIUM 40 MG: 40 TABLET, DELAYED RELEASE ORAL at 17:59

## 2021-07-05 RX ADMIN — INSULIN LISPRO 1 UNITS: 100 INJECTION, SOLUTION INTRAVENOUS; SUBCUTANEOUS at 12:09

## 2021-07-05 RX ADMIN — PANTOPRAZOLE SODIUM 40 MG: 40 TABLET, DELAYED RELEASE ORAL at 09:07

## 2021-07-05 RX ADMIN — CEFEPIME HYDROCHLORIDE 2000 MG: 2 INJECTION, POWDER, FOR SOLUTION INTRAVENOUS at 09:02

## 2021-07-05 RX ADMIN — CLOTRIMAZOLE: 0.01 CREAM TOPICAL at 10:33

## 2021-07-05 RX ADMIN — FERROUS SULFATE TAB 325 MG (65 MG ELEMENTAL FE) 325 MG: 325 (65 FE) TAB at 07:41

## 2021-07-05 RX ADMIN — MELATONIN TAB 3 MG 3 MG: 3 TAB at 21:36

## 2021-07-05 RX ADMIN — INSULIN GLARGINE 6 UNITS: 100 INJECTION, SOLUTION SUBCUTANEOUS at 10:26

## 2021-07-05 NOTE — PROGRESS NOTES
Vancomycin Assessment    Amaod Oakes is a 80 y o  male who will be receiving vancomycin for osteomyelitis, MRSA confirmed  Relevant clinical data and objective history reviewed:  Creatinine   Date Value Ref Range Status   07/05/2021 0 81 0 60 - 1 30 mg/dL Final     Comment:     Standardized to IDMS reference method   07/04/2021 0 85 0 60 - 1 30 mg/dL Final     Comment:     Standardized to IDMS reference method   07/03/2021 0 86 0 60 - 1 30 mg/dL Final     Comment:     Standardized to IDMS reference method   03/27/2014 0 77 0 60 - 1 30 mg/dL Final     Comment:     Standardized to IDMS reference method     /59 (BP Location: Left arm)   Pulse 84   Temp 97 5 °F (36 4 °C) (Oral)   Resp 18   Ht 5' 5" (1 651 m)   Wt 62 9 kg (138 lb 10 7 oz)   SpO2 96%   BMI 23 08 kg/m²   I/O last 3 completed shifts: In: 991 [P O :991]  Out: 2900 [Urine:2900]  Lab Results   Component Value Date/Time    BUN 10 07/05/2021 05:39 AM    BUN 19 03/27/2014 07:22 AM    WBC 8 48 07/05/2021 05:39 AM    HGB 11 9 (L) 07/05/2021 05:39 AM    HCT 36 4 (L) 07/05/2021 05:39 AM    MCV 95 07/05/2021 05:39 AM     07/05/2021 05:39 AM     Temp Readings from Last 3 Encounters:   07/05/21 97 5 °F (36 4 °C) (Oral)   06/25/20 97 5 °F (36 4 °C) (Oral)   03/10/20 (!) 97 4 °F (36 3 °C) (Oral)     Vancomycin Days of Therapy: 1    Assessment/Plan  The patient is being started on vancomycin utilizing scheduled dosing based on actual body weight  Baseline risks associated with therapy include: concomitant nephrotoxic medications and advanced age  After clinical evaluation, patient to receive 1250 mg loading dose followed by 750 mg q12h  Pharmacy will follow closely for s/sx of nephrotoxicity, infusion reactions, and appropriateness of therapy  BMP and CBC will be ordered per protocol  Plan for trough as patient approaches steady state, prior to the 4th  dose at approximately 2100 on 7/6/21    Due to infection severity, will target a trough of 15-20 (appropriate for most indications)  Pharmacy will continue to follow the patients culture results and clinical progress daily      Young Covington, PharmD  Pharmacist

## 2021-07-05 NOTE — PROGRESS NOTES
[facility] Podiatry - Progress Note  Patient: Gil Villalobos 80 y o  male   MRN: [id number]  PCP: Gilma Aviles MD  Unit/Bed#: [facility] 320-01 Encounter: [id number]  Date Of Visit: 21    ASSESSMENT:    Gil Villalobos is a 80 y o  male with:    1  R 2nd digit ulceration probing to bone - [place] 3 - POA  2  T2DM  3  Dementia             PLAN:    · Second toe is unsalvageable  · Spoke with POA  With combined recommendations from Podiatry and Infectious Disease she feels like the best course is to remove the toe if medical clearance is possible  · Will wait to schedule surgery until clearance per primary team   · Continue local wound care with Betadine until procedure  · Rest of care per primary team    Weight bearing status:  WBAT      SUBJECTIVE:     The patient was seen, evaluated, and assessed at bedside today  The patient was awake, and in no acute distress  No acute events overnight  The patient only expressed that the toe pain when dressing changes happened  Patient denies N/V/F/chills/SOB/CP  OBJECTIVE:     Vitals:   /59 (BP Location: Left arm)   Pulse 84   Temp 97 5 °F (36 4 °C) (Oral)   Resp 18   Ht 5' 5" (1 651 m)   Wt 62 9 kg (138 lb 10 7 oz)   SpO2 96%   BMI 23 08 kg/m²     Temp (24hrs), Av 9 °F (36 6 °C), Min:97 5 °F (36 4 °C), Max:98 3 °F (36 8 °C)      Physical Exam:     General:  Alert, cooperative, and in no distress  Lower extremity exam:  Cardiovascular status at baseline  Neurological status is baseline  Musculoskeletal status baseline  No calf tenderness noted  Wound 1 located the dorsal aspect of the right 2nd toe PIPJ, measures approximately 1 cm round  Probe to bone positive  Toe is erythematous to the MTPJ  With mottled appearance and slow capillary refill        Additional Data:     Labs:    Results from last 7 days   Lab Units 21  0539 21  0540   WBC Thousand/uL 8 48 9 09   HEMOGLOBIN g/dL 11 9* 10 6*   HEMATOCRIT % 36 4* 33 0*   PLATELETS Thousands/uL 348 320   NEUTROS PCT %  --  70   LYMPHS PCT %  --  21   MONOS PCT %  --  6   EOS PCT %  --  3     Results from last 7 days   Lab Units 07/05/21  0539 07/04/21  0654   POTASSIUM mmol/L 4 0 4 2   CHLORIDE mmol/L 102 104   CO2 mmol/L 30 27   BUN mg/dL 10 10   CREATININE mg/dL 0 81 0 85   CALCIUM mg/dL 9 0 8 9   ALK PHOS U/L  --  127*   ALT U/L  --  17   AST U/L  --  26           * I Have Reviewed All Lab Data Listed Above  Recent Cultures (last 7 days):     Results from last 7 days   Lab Units 07/02/21  1134   GRAM STAIN RESULT  No Polys*  1+ Gram negative rods*  Rare Gram positive cocci in pairs*           Imaging: I have personally reviewed pertinent films in PACS  EKG, Pathology, and Other Studies: I have personally reviewed pertinent reports  ** Please Note: Portions of the record may have been created with voice recognition software  Occasional wrong word or "sound a like" substitutions may have occurred due to the inherent limitations of voice recognition software  Read the chart carefully and recognize, using context, where substitutions have occurred   **

## 2021-07-05 NOTE — PHYSICAL THERAPY NOTE
Physical Therapy Screen    Patient's Name: Sergio Douglas     07/05/21 1516   PT Last Visit   PT Visit Date 07/05/21   Note Type   Note type Screen       Orders received, chart reviewed  Per CM note, pt was admitted from [facility] where he was requiring "max assist with ambulation and ADLs " Pt was already seen by PT and noted to be functional at baseline level with no further acute PT needs  PT from  recommended returning [place] [facility] [place] continued assist from staff- continuing to recommend the same  Will D/C acute PT order  Thank you       Aaron Miranda, PT, DPT

## 2021-07-05 NOTE — OCCUPATIONAL THERAPY NOTE
OCCUPATIONAL THERAPY SCREEN    ORDERS RECEIVED  CHART REVIEW COMPLETED  PER CM NOTE FROM 6/28, PT FROM [facility] WHERE PT IS "MAX ASSIT WITH AMBULATION AND ADLS" WITH PLAN TO RETURN  PT PARTICIPATED IN INTIAL OT EVAL ON SAME DATE WHERE PT WAS DEEMED TO BE FUNCTIONING SIMILAR TO BASELINE  NO ACUTE CARE OT NEEDS  PLAN TO RETURN TO [facility] WITH APPROPRIATE LEVEL CARE WHEN MEDICALLY CLEARED  D/C OT  THANK YOU       Vinny Rod, PIPER, OTR/L

## 2021-07-05 NOTE — PROGRESS NOTES
INTERNAL MEDICINE RESIDENCY PROGRESS NOTE     PATIENT INFORMATION     Name: Dakota Kiser   Age & Sex: 80 y o  male   MRN: [id number]  Hospital Stay Days: 9  Unit/Bed#: [facility] 320-01   Encounter: [id number]  Team: SOD Team B     ASSESSMENT/PLAN     Principal Problem:    Right second toe ulcer ([facility] )  Active Problems:    Dementia without behavioral disturbance ([place] [address] )    History of coronary artery disease    Peripheral vascular disease in diabetes mellitus ([place] [address] )    BPH (benign prostatic hyperplasia)    Type 2 diabetes mellitus, with long-term current use of insulin ([facility])    Diastolic heart failure ([place] [address] )    Hypertension    Anemia    Hearing difficulty of both ears    Actinic dermatitis    Constipation    Dark urine    Dark urine  Assessment & Plan  Noted in suprapubic cathter bag  UA unreliable due to interfering substance  Received IV hydration  Appears to have resolved  Continue to monitor vitals    Constipation  Assessment & Plan  Unknown last bowel movement, patient reported 1 bowel movement yesterday however nothing charted  · Miralax 17 g daily   · Senokot daily  · Dulcolax 10 mg suppository PRN      Actinic dermatitis  Assessment & Plan  Attending admitted noted on admission including scald and different areas mainly bilateral groin and right lower extremity  · Clotrimazole cream  · Wound care consult    Hearing difficulty of both ears  Assessment & Plan  Hearing difficulty noted on admission most likely presbycusis         Anemia  Assessment & Plan  History of anemia, hemoglobin baseline 11-13, hemoglobin stable  · Continue home ferrous sulfate 325 ; will give every other day instead of daily while inpatient  · Bowel regimen daily  · May need MMA, folate, iron panel as outpatient  · Continue to monitor daily CBC      Hypertension  Assessment & Plan  History of hypertension, blood pressure well controlled on admission 130s over 60s  · Continue home Lopressor 25 b i d   · Continue Bumex 0 5 mg daily - diastolic heart failure  · Monitor vitals    Diastolic heart failure (HCC)  Assessment & Plan  Wt Readings from Last 3 Encounters:   07/05/21 62 9 kg (138 lb 10 7 oz)   03/10/20 77 1 kg (170 lb)   09/10/19 77 1 kg (170 lb)     History of chronic diastolic heart failure, right lower extremity grade 1 edema on admission, denies shortness of breath, CTAB on admission  Plan  · Continue home Lopressor 25 b i d   · Continue home Bumex 0 5 mg q d  · Daily in/out  · Cardiac diet, Na <2G and <2L fluid     Type 2 diabetes mellitus, with long-term current use of insulin [facility])  Assessment & Plan  Lab Results   Component Value Date    HGBA1C 6 9 (H) 06/26/2021       Recent Labs     07/04/21  1141 07/04/21  1622 07/04/21  2040 07/05/21  0549   POCGLU 128 134 114 148*       Blood Sugar Average: Last 72 hrs:  (P) 602 [id number]   Relatively controlled H A1c 6 9  Home medications include Lantus 12 units at noon, and metformin 500  · Hold metformin while inpatient  · Decreased Lantus to 6 units in setting of decreased oral intake and sugars in 's  · Monitor glucose and adjust as needed    BPH (benign prostatic hyperplasia)  Assessment & Plan  Known history of large prostate, came with suprapubic Hays in place  · Continue home Flomax 0 4 mg daily  · Consider holding Myrbetriq given the suprapubic Hays in place    Peripheral vascular disease in diabetes mellitus [facility])  Assessment & Plan  S/p L BKA  6/27 Lower limb arterial duplex - Diffuse disease noted throughout the femoropopliteal arteries without  significant focal stenosis  Evidence suggestive of Tibioperoneal occlusive disease  Ankle/Brachial index: Non-compressible  Metatarsal pressure and Great toe pressure unobtainable secondary to attenuated PPG waveform  PVR/ PPG tracings are dampened    · Continue ASA 81 mg daily  · Continue Plavix 75 mg daily  · See 2nd right toe ulcer assessment and plan    History of coronary artery disease  Assessment & Plan  History of coronary artery disease, denies any chest pain or palpitation on admission    Plan  · Continue aspirin 81 mg daily  · Continue Plavix 7 mg daily  · Continue atorvastatin 10 mg daily    Dementia without behavioral disturbance (HCC)  Assessment & Plan  History of dementia without behavioral disturbance, old history of stroke, AO x1 to self, baseline and stable  His niece, Oliverio Moore, is POA  Contact at [phone]     * Right second toe ulcer [facility])  Assessment & Plan  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 confirmed by foot x-ray  Leukocytosis on admission 11 5, now 8 46  CRP of 18 4, ESR 59 on admission  Patient is afebrile, no tachycardia no tachypnea, blood pressure WNL  · Podiatry following, appreciate recommendations  Patient and SOLIS Jara) leaning toward non-surgical treatment  · ID consulted, recommending holding antibiotic therapy for 48-72 hours and biopsy of the right 2nd toe ulcer to help guide and optimized therapy  · Culture of toe ulcer taken 7/2 -> f/u results to help guide antibiotic treatment, results pending -> 2+ GNR  · Patient started on empiric ceftriaxone and Flagyl as per ID recommendations  · 7/3/21 -- Patient will likely ineed a picc line for long-term antibiotic upon discharge to SNF -- d/w POA regarding that and she is agreeable -> consent obtained from [place] over the phone on 07/05 -> plan for PICC placement  · Daily CBC  · Monitor vitals  · Wound care  · P r n  Tylenol        Disposition: Inpatient     SUBJECTIVE     Patient seen and examined  No acute events overnight  Today overall the patient is feeling well and had no acute complaints  Contacted SOLIS Torres over the phone who consented to PICC line placement for continued antibiotics  PICC line was ordered earlier this morning  Otherwise the patient had no other acute complaints      OBJECTIVE     Vitals:    07/04/21 1540 07/04/21 2131 07/05/21 0550 07/05/21 0551   BP: 126/58 122/60  118/59   BP Location: Right arm Right arm  Left arm   Pulse: 86 86  84   Resp: 18 18  18   Temp: 98 3 °F (36 8 °C) 97 8 °F (36 6 °C)  97 5 °F (36 4 °C)   TempSrc: Oral Oral  Oral   SpO2: 96% 96%  96%   Weight:   62 9 kg (138 lb 10 7 oz)    Height:          Temperature:   Temp (24hrs), Av 9 °F (36 6 °C), Min:97 5 °F (36 4 °C), Max:98 3 °F (36 8 °C)    Temperature: 97 5 °F (36 4 °C)  Intake & Output:  I/O       / 0701 - /04 0700 / 07 -  0700 / 07 -  0700    P  O  275 941     Total Intake(mL/kg) 275 (4 4) 941 (15)     Urine (mL/kg/hr) 2100 (1 4) 1800 (1 2)     Total Output 2100 1800     Net -1825 -859                  Intake/Output Summary (Last 24 hours) at 2021 0843  Last data filed at 2021 0500  Gross per 24 hour   Intake 941 ml   Output 1800 ml   Net -859 ml     No intake/output data recorded  Weights:   IBW (Ideal Body Weight): 61 5 kg    Body mass index is 23 08 kg/m²  Weight (last 2 days)     Date/Time   Weight    21 0550   62 9 (138 67)    21 0600   62 9 (138 67)            Physical Exam  Constitutional:       Appearance: He is well-developed  HENT:      Head: Normocephalic and atraumatic  Nose: Nose normal    Eyes:      General:         Right eye: No discharge  Left eye: No discharge  Conjunctiva/sclera: Conjunctivae normal       Pupils: Pupils are equal, round, and reactive to light  Neck:      Thyroid: No thyromegaly  Cardiovascular:      Rate and Rhythm: Normal rate and regular rhythm  Heart sounds: Normal heart sounds  No murmur heard  No friction rub  No gallop  Pulmonary:      Effort: Pulmonary effort is normal  No respiratory distress  Breath sounds: Normal breath sounds  No stridor  No wheezing or rales  Chest:      Chest wall: No tenderness  Abdominal:      General: Bowel sounds are normal  There is no distension  Palpations: Abdomen is soft  There is no mass  Tenderness: There is no abdominal tenderness  There is no guarding or rebound  Musculoskeletal:         General: No tenderness or deformity  Right lower leg: No edema  Left lower leg: No edema  Comments: Left BKA   Lymphadenopathy:      Cervical: No cervical adenopathy  Skin:     General: Skin is warm and dry  Comments: Ulcer on right lower extremity   Neurological:      Mental Status: He is alert  Psychiatric:         Mood and Affect: Mood normal          Behavior: Behavior normal          Thought Content: Thought content normal          Judgment: Judgment normal         LABORATORY DATA     Labs: I have personally reviewed pertinent reports  Results from last 7 days   Lab Units 07/05/21  0539 07/04/21  0654 07/03/21  0540 07/02/21  0625 07/01/21  0531   WBC Thousand/uL 8 48 9 14 9 09 8 68 7 88   HEMOGLOBIN g/dL 11 9* 12 3 10 6* 11 2* 10 4*   HEMATOCRIT % 36 4* 38 3 33 0* 34 4* 32 5*   PLATELETS Thousands/uL 348 351 320 317 306   NEUTROS PCT %  --   --  70 66 70   MONOS PCT %  --   --  6 7 7      Results from last 7 days   Lab Units 07/05/21  0539 07/04/21  0654 07/03/21  0540   POTASSIUM mmol/L 4 0 4 2 4 0   CHLORIDE mmol/L 102 104 104   CO2 mmol/L 30 27 26   BUN mg/dL 10 10 11   CREATININE mg/dL 0 81 0 85 0 86   CALCIUM mg/dL 9 0 8 9 8 8   ALK PHOS U/L  --  127*  --    ALT U/L  --  17  --    AST U/L  --  26  --      Serum creatinine: 0 81 mg/dL 07/05/21 0539  Estimated creatinine clearance: 50 6 mL/min   Results from last 7 days   Lab Units 07/04/21  0654   MAGNESIUM mg/dL 2 2                        IMAGING & DIAGNOSTIC TESTING     Radiology Results: I have personally reviewed pertinent reports  XR foot 3+ views RIGHT    Result Date: 6/27/2021  Impression: 2nd mid phalanx, 2nd distal phalanx osteomyelitis suspected  The study was marked in [facility] for immediate notification  Workstation performed: [id number]     Other Diagnostic Testing: I have personally reviewed pertinent reports        ACTIVE MEDICATIONS     Current Facility-Administered Medications   Medication Dose Route Frequency    acetaminophen (TYLENOL) tablet 488 mg  488 mg Oral Q6H PRN    aspirin (ECOTRIN LOW STRENGTH) EC tablet 81 mg  81 mg Oral Daily    atorvastatin (LIPITOR) tablet 10 mg  10 mg Oral Daily With Dinner    bisacodyl (DULCOLAX) rectal suppository 10 mg  10 mg Rectal Daily PRN    bumetanide (BUMEX) tablet 0 5 mg  0 5 mg Oral Daily    cefTRIAXone (ROCEPHIN) 1,000 mg in dextrose 5 % 50 mL IVPB  1,000 mg Intravenous Q24H    cholecalciferol (VITAMIN D3) tablet 1,000 Units  1,000 Units Oral Daily    clopidogrel (PLAVIX) tablet 75 mg  75 mg Oral Daily    clotrimazole (LOTRIMIN) 1 % cream   Topical BID    enoxaparin (LOVENOX) subcutaneous injection 40 mg  40 mg Subcutaneous Daily    ferrous sulfate tablet 325 mg  325 mg Oral Daily With Breakfast    fluticasone (FLONASE) 50 mcg/act nasal spray 1 spray  1 spray Each Nare Daily    insulin glargine (LANTUS) subcutaneous injection 6 Units 0 06 mL  6 Units Subcutaneous Daily    insulin lispro (HumaLOG) 100 units/mL subcutaneous injection 1-5 Units  1-5 Units Subcutaneous 4x Daily (AC & HS)    melatonin tablet 3 mg  3 mg Oral HS    metoprolol tartrate (LOPRESSOR) tablet 25 mg  25 mg Oral Q12H AGGIE    metroNIDAZOLE (FLAGYL) tablet 500 mg  500 mg Oral Q8H [address]    pantoprazole (PROTONIX) EC tablet 40 mg  40 mg Oral BID    polyethylene glycol (MIRALAX) packet 17 g  17 g Oral Daily    saccharomyces boulardii (FLORASTOR) capsule 250 mg  250 mg Oral BID    senna-docusate sodium (SENOKOT S) 8 6-50 mg per tablet 2 tablet  2 tablet Oral HS    tamsulosin (FLOMAX) capsule 0 4 mg  0 4 mg Oral Daily With Dinner     VTE Pharmacologic Prophylaxis: Enoxaparin (Lovenox)  VTE Mechanical Prophylaxis: sequential compression device    ==  Jordon Pluknett MD  IM Resident, PGY-2  [facility] Internal Medicine Residency

## 2021-07-05 NOTE — PROGRESS NOTES
Progress Note - Infectious Disease   Shirley Prieto 80 y o  male MRN: [id number]  Unit/Bed#: [facility] 320-01 Encounter: [id number]      Impression/Recommendations:  1  Right 2nd toe osteomyelitis, confirmed but foot x-ray  Patient is status post bone biopsy, with growth of MRSA and Pseudomonas  Antibiotic regimen will be modified  Given advanced age, underlying PVD and somewhat poorly controlled DM, probability of toe salvage is essentially nil  Unfortunately, patient and POA insist on attempt at toe salvage with long-term IV antibiotic  I discussed with the patient in great detail again today  I cautioned him that 6 week course of vancomycin/cefepime via PICC is not benign, with risk of renal failure and PICC related infection, and at the end, he will probably lose the toe anyway  Patient wants to pursue toe salvage still  Change antibiotic to vancomycin/cefepime  Treat x6 weeks total, through 8/15  Patient will need PICC placement  Patient is at high risk of toe loss regardless of antibiotic treatment  2  Right 2nd toe ulcer, likely multifactorial, with DM and PVD contributing  I doubt the ulcer will heal with local care  Local ulcer care per Podiatry  3  DM, poorly controlled, with hyperglycemia and elevated hemoglobin A1c  This plays a big role in poor wound healing and infection  Discussed with patient regarding the need to better control blood sugar  Management per primary service  4  PVD  LEADS with disease but no significant focal stenosis  Discussed with patient in detail regarding the above plan  Antibiotics:  Ceftriaxone/Flagyl # 4     Subjective:  Patient feels well  No pain in toe or foot  Temperature stays down  No chills  He is tolerating antibiotic well  No nausea, vomiting or diarrhea      Objective:  Vitals:  Temp:  [97 5 °F (36 4 °C)-98 3 °F (36 8 °C)] 97 5 °F (36 4 °C)  HR:  [84-86] 84  Resp:  [18] 18  BP: (118-126)/(58-60) 118/59  SpO2:  [96 %] 96 %  Temp (24hrs), Av 9 °F (36 6 °C), Min:97 5 °F (36 4 °C), Max:98 3 °F (36 8 °C)  Current: Temperature: 97 5 °F (36 4 °C)    Physical Exam:     General: Awake, alert, cooperative, no distress  Neck:  Supple  No mass  No lymphadenopathy  Lungs: Expansion symmetric, no rales, no wheezing, respirations unlabored  Heart:  Regular rate and rhythm, S1 and S2 normal, no murmur  Abdomen: Soft, nondistended, non-tender, bowel sounds active all four quadrants, no masses, no organomegaly  Extremities: No edema  Stable right 2nd toe ulcer, without purulence  Stable chronic changes  Nontender  Skin:  No rash  Neuro: Moves all extremities  Invasive Devices     Peripheral Intravenous Line            Peripheral IV 21 Dorsal (posterior); Left Forearm <1 day          Drain            Suprapubic Catheter -- days                Labs studies:   I have personally reviewed pertinent labs  Results from last 7 days   Lab Units 21  0539 21  0654 21  0540   POTASSIUM mmol/L 4 0 4 2 4 0   CHLORIDE mmol/L 102 104 104   CO2 mmol/L 30 27 26   BUN mg/dL 10 10 11   CREATININE mg/dL 0 81 0 85 0 86   EGFR ml/min/1 73sq m 77 76 75   CALCIUM mg/dL 9 0 8 9 8 8   AST U/L  --  26  --    ALT U/L  --  17  --    ALK PHOS U/L  --  127*  --      Results from last 7 days   Lab Units 21  0539 21  0654 21  0540   WBC Thousand/uL 8 48 9 14 9 09   HEMOGLOBIN g/dL 11 9* 12 3 10 6*   PLATELETS Thousands/uL 348 351 320     Results from last 7 days   Lab Units 21  1134   GRAM STAIN RESULT  No Polys*  1+ Gram negative rods*  Rare Gram positive cocci in pairs*       Imaging Studies:   I have personally reviewed pertinent imaging study reports and images in PACS  EKG, Pathology, and Other Studies:   I have personally reviewed pertinent reports

## 2021-07-06 LAB
ANION GAP SERPL CALCULATED.3IONS-SCNC: 5 MMOL/L (ref 4–13)
BASOPHILS # BLD AUTO: 0.04 THOUSANDS/ΜL (ref 0–0.1)
BASOPHILS NFR BLD AUTO: 1 % (ref 0–1)
BUN SERPL-MCNC: 9 MG/DL (ref 5–25)
CALCIUM SERPL-MCNC: 8.9 MG/DL (ref 8.3–10.1)
CHLORIDE SERPL-SCNC: 104 MMOL/L (ref 100–108)
CO2 SERPL-SCNC: 24 MMOL/L (ref 21–32)
CREAT SERPL-MCNC: 0.76 MG/DL (ref 0.6–1.3)
EOSINOPHIL # BLD AUTO: 0.17 THOUSAND/ΜL (ref 0–0.61)
EOSINOPHIL NFR BLD AUTO: 2 % (ref 0–6)
ERYTHROCYTE [DISTWIDTH] IN BLOOD BY AUTOMATED COUNT: 12.8 % (ref 11.6–15.1)
GFR SERPL CREATININE-BSD FRML MDRD: 79 ML/MIN/1.73SQ M
GLUCOSE SERPL-MCNC: 120 MG/DL (ref 65–140)
GLUCOSE SERPL-MCNC: 126 MG/DL (ref 65–140)
GLUCOSE SERPL-MCNC: 126 MG/DL (ref 65–140)
GLUCOSE SERPL-MCNC: 239 MG/DL (ref 65–140)
GLUCOSE SERPL-MCNC: 99 MG/DL (ref 65–140)
HCT VFR BLD AUTO: 34.7 % (ref 36.5–49.3)
HGB BLD-MCNC: 11.2 G/DL (ref 12–17)
IMM GRANULOCYTES # BLD AUTO: 0.05 THOUSAND/UL (ref 0–0.2)
IMM GRANULOCYTES NFR BLD AUTO: 1 % (ref 0–2)
LYMPHOCYTES # BLD AUTO: 1.79 THOUSANDS/ΜL (ref 0.6–4.47)
LYMPHOCYTES NFR BLD AUTO: 21 % (ref 14–44)
MCH RBC QN AUTO: 31.1 PG (ref 26.8–34.3)
MCHC RBC AUTO-ENTMCNC: 32.3 G/DL (ref 31.4–37.4)
MCV RBC AUTO: 96 FL (ref 82–98)
MONOCYTES # BLD AUTO: 0.56 THOUSAND/ΜL (ref 0.17–1.22)
MONOCYTES NFR BLD AUTO: 7 % (ref 4–12)
NEUTROPHILS # BLD AUTO: 5.97 THOUSANDS/ΜL (ref 1.85–7.62)
NEUTS SEG NFR BLD AUTO: 68 % (ref 43–75)
NRBC BLD AUTO-RTO: 0 /100 WBCS
PLATELET # BLD AUTO: 348 THOUSANDS/UL (ref 149–390)
PMV BLD AUTO: 10 FL (ref 8.9–12.7)
POTASSIUM SERPL-SCNC: 4.3 MMOL/L (ref 3.5–5.3)
RBC # BLD AUTO: 3.6 MILLION/UL (ref 3.88–5.62)
SODIUM SERPL-SCNC: 133 MMOL/L (ref 136–145)
VANCOMYCIN TROUGH SERPL-MCNC: 19.1 UG/ML (ref 10–20)
WBC # BLD AUTO: 8.58 THOUSAND/UL (ref 4.31–10.16)

## 2021-07-06 PROCEDURE — 82948 REAGENT STRIP/BLOOD GLUCOSE: CPT

## 2021-07-06 PROCEDURE — 99232 SBSQ HOSP IP/OBS MODERATE 35: CPT | Performed by: PODIATRIST

## 2021-07-06 PROCEDURE — 85025 COMPLETE CBC W/AUTO DIFF WBC: CPT | Performed by: STUDENT IN AN ORGANIZED HEALTH CARE EDUCATION/TRAINING PROGRAM

## 2021-07-06 PROCEDURE — 99232 SBSQ HOSP IP/OBS MODERATE 35: CPT | Performed by: INTERNAL MEDICINE

## 2021-07-06 PROCEDURE — 80048 BASIC METABOLIC PNL TOTAL CA: CPT | Performed by: STUDENT IN AN ORGANIZED HEALTH CARE EDUCATION/TRAINING PROGRAM

## 2021-07-06 PROCEDURE — 80202 ASSAY OF VANCOMYCIN: CPT | Performed by: INTERNAL MEDICINE

## 2021-07-06 RX ADMIN — ASPIRIN 81 MG: 81 TABLET, COATED ORAL at 09:33

## 2021-07-06 RX ADMIN — CEFEPIME HYDROCHLORIDE 2000 MG: 2 INJECTION, POWDER, FOR SOLUTION INTRAVENOUS at 21:34

## 2021-07-06 RX ADMIN — METOPROLOL TARTRATE 25 MG: 25 TABLET, FILM COATED ORAL at 09:33

## 2021-07-06 RX ADMIN — INSULIN LISPRO 2 UNITS: 100 INJECTION, SOLUTION INTRAVENOUS; SUBCUTANEOUS at 12:51

## 2021-07-06 RX ADMIN — CLOTRIMAZOLE: 0.01 CREAM TOPICAL at 09:39

## 2021-07-06 RX ADMIN — METOPROLOL TARTRATE 25 MG: 25 TABLET, FILM COATED ORAL at 21:38

## 2021-07-06 RX ADMIN — ENOXAPARIN SODIUM 40 MG: 40 INJECTION SUBCUTANEOUS at 09:33

## 2021-07-06 RX ADMIN — FERROUS SULFATE TAB 325 MG (65 MG ELEMENTAL FE) 325 MG: 325 (65 FE) TAB at 09:33

## 2021-07-06 RX ADMIN — VANCOMYCIN HYDROCHLORIDE 750 MG: 750 INJECTION, SOLUTION INTRAVENOUS at 22:43

## 2021-07-06 RX ADMIN — Medication 1000 UNITS: at 09:37

## 2021-07-06 RX ADMIN — CEFEPIME HYDROCHLORIDE 2000 MG: 2 INJECTION, POWDER, FOR SOLUTION INTRAVENOUS at 09:37

## 2021-07-06 RX ADMIN — ATORVASTATIN CALCIUM 10 MG: 20 TABLET, FILM COATED ORAL at 17:30

## 2021-07-06 RX ADMIN — PANTOPRAZOLE SODIUM 40 MG: 40 TABLET, DELAYED RELEASE ORAL at 09:37

## 2021-07-06 RX ADMIN — INSULIN GLARGINE 6 UNITS: 100 INJECTION, SOLUTION SUBCUTANEOUS at 09:32

## 2021-07-06 RX ADMIN — PANTOPRAZOLE SODIUM 40 MG: 40 TABLET, DELAYED RELEASE ORAL at 17:31

## 2021-07-06 RX ADMIN — CLOTRIMAZOLE: 0.01 CREAM TOPICAL at 17:33

## 2021-07-06 RX ADMIN — DOCUSATE SODIUM AND SENNOSIDES 2 TABLET: 8.6; 5 TABLET ORAL at 21:38

## 2021-07-06 RX ADMIN — VANCOMYCIN HYDROCHLORIDE 750 MG: 750 INJECTION, SOLUTION INTRAVENOUS at 10:57

## 2021-07-06 RX ADMIN — Medication 250 MG: at 17:33

## 2021-07-06 RX ADMIN — Medication 250 MG: at 09:38

## 2021-07-06 RX ADMIN — MELATONIN TAB 3 MG 3 MG: 3 TAB at 21:38

## 2021-07-06 RX ADMIN — FLUTICASONE PROPIONATE 1 SPRAY: 50 SPRAY, METERED NASAL at 09:38

## 2021-07-06 RX ADMIN — TAMSULOSIN HYDROCHLORIDE 0.4 MG: 0.4 CAPSULE ORAL at 17:32

## 2021-07-06 RX ADMIN — POLYETHYLENE GLYCOL 3350 17 G: 17 POWDER, FOR SOLUTION ORAL at 09:37

## 2021-07-06 RX ADMIN — CLOPIDOGREL BISULFATE 75 MG: 75 TABLET ORAL at 09:37

## 2021-07-06 RX ADMIN — BUMETANIDE 0.5 MG: 0.5 TABLET ORAL at 09:38

## 2021-07-06 NOTE — PROGRESS NOTES
Progress Note - Infectious Disease   Mandy Suazo 80 y o  male MRN: [id number]  Unit/Bed#: [facility] 320-01 Encounter: [id number]      Impression/Recommendations:  1  Right 2nd toe osteomyelitis, confirmed but foot x-ray  Patient is status post bone biopsy, with growth of MRSA and Pseudomonas  Antibiotic regimen will be modified  Given advanced age, underlying PVD and somewhat poorly controlled DM, probability of toe salvage is essentially nil  Fortunately, it appears the patient's POA is finally agreeable to toe amputation  This is the right decision  Patient has high risk for long-term IV antibiotic toxicity and very low probability of cure  Continue vancomycin/cefepime for now  Plan for toe amputation noted  Likely discontinue antibiotics after toe amputation      2  Right 2nd toe ulcer, likely multifactorial, with DM and PVD contributing  I doubt the ulcer will heal with local care  Local ulcer care per Podiatry      3  DM, poorly controlled, with hyperglycemia and elevated hemoglobin A1c  This plays a big role in poor wound healing and infection  Patient needs better long-term control of blood sugar  Management per primary service      4  PVD  LEADS with disease but no significant focal stenosis      Discussed with patient in detail regarding the above plan  Discussed with medicine service earlier      Antibiotics:  Vancomycin/cefepime  # 2      Subjective:  Patient is comfortable  No pain in toe or foot  Temperature stays down  No chills  He is tolerating antibiotic well  No nausea, vomiting or diarrhea      Objective:  Vitals:  Temp:  [97 4 °F (36 3 °C)-98 5 °F (36 9 °C)] 97 8 °F (36 6 °C)  HR:  [77-92] 92  Resp:  [16-19] 19  BP: (105-123)/(53-68) 119/57  SpO2:  [97 %-100 %] 100 %  Temp (24hrs), Av 9 °F (36 6 °C), Min:97 4 °F (36 3 °C), Max:98 5 °F (36 9 °C)  Current: Temperature: 97 8 °F (36 6 °C)    Physical Exam:     General: Awake, alert, cooperative, no distress  Neck:  Supple   No mass   No lymphadenopathy  Lungs: Expansion symmetric, no rales, no wheezing, respirations unlabored  Heart:  Regular rate and rhythm, S1 and S2 normal, no murmur  Abdomen: Soft, nondistended, non-tender, bowel sounds active all four quadrants, no masses, no organomegaly  Extremities: Stable right 2nd toe ulcer, without purulence  Stable mild erythema  No warmth  Nontender  Skin:  No rash  Neuro: Moves all extremities  Invasive Devices     Peripheral Intravenous Line            Peripheral IV 07/04/21 Dorsal (posterior); Left Forearm 1 day          Drain            Suprapubic Catheter -- days                Labs studies:   I have personally reviewed pertinent labs  Results from last 7 days   Lab Units 07/06/21  0505 07/05/21  0539 07/04/21  0654   POTASSIUM mmol/L 4 3 4 0 4 2   CHLORIDE mmol/L 104 102 104   CO2 mmol/L 24 30 27   BUN mg/dL 9 10 10   CREATININE mg/dL 0 76 0 81 0 85   EGFR ml/min/1 73sq m 79 77 76   CALCIUM mg/dL 8 9 9 0 8 9   AST U/L  --   --  26   ALT U/L  --   --  17   ALK PHOS U/L  --   --  127*     Results from last 7 days   Lab Units 07/06/21  0505 07/05/21  0539 07/04/21  0654   WBC Thousand/uL 8 58 8 48 9 14   HEMOGLOBIN g/dL 11 2* 11 9* 12 3   PLATELETS Thousands/uL 348 348 351     Results from last 7 days   Lab Units 07/02/21  1134   GRAM STAIN RESULT  No Polys*  1+ Gram negative rods*  Rare Gram positive cocci in pairs*       Imaging Studies:   I have personally reviewed pertinent imaging study reports and images in PACS  EKG, Pathology, and Other Studies:   I have personally reviewed pertinent reports

## 2021-07-06 NOTE — PROGRESS NOTES
Podiatry - Progress Note  Patient: Sotero Mccurdy 80 y o  male   MRN: [id number]  PCP: Nic Zamudio MD  Unit/Bed#: [facility] 320-01 Encounter: [id number]  Date Of Visit: 21    ASSESSMENT:    Sotero Mccurdy is a 80 y o  male with:    1  R 2nd digit ulceration probing to bone - [place] 3 - POA  2  T2DM  3  Dementia          PLAN:    · Second toe is unsalvageable, Plan for Digital amputation on   Confirmed with POA this morning  · Need medical clearance for surgery from other services   · Continue local wound care until surgery  Betadine  · Elevation on green foam wedges or pillows when non-ambulatory  · Rest of care per primary team      Weightbearing status: WBAT    SUBJECTIVE:     The patient was seen, evaluated, and assessed at bedside today  The patient was awake, alert, and in no acute distress  Eating breakfast  No acute events overnight  The patient was not willing to talk  I spoke to the aid  And Called Daria Stevens the [facility] for confirmation of surgical plan  OBJECTIVE:     Vitals:   /57   Pulse 92   Temp 97 8 °F (36 6 °C) (Oral)   Resp 19   Ht 5' 5" (1 651 m)   Wt 56 4 kg (124 lb 5 4 oz)   SpO2 100%   BMI 20 69 kg/m²     Temp (24hrs), Av 9 °F (36 6 °C), Min:97 4 °F (36 3 °C), Max:98 5 °F (36 9 °C)      Physical Exam:     General: Alert, cooperative and no distress  Lungs: Non labored breathing  Abdomen: Soft, non-tender    Lower extremity exam:    Dressing left clean dry and intact      Additional Data:     Labs:    Results from last 7 days   Lab Units 21  0505   WBC Thousand/uL 8 58   HEMOGLOBIN g/dL 11 2*   HEMATOCRIT % 34 7*   PLATELETS Thousands/uL 348   NEUTROS PCT % 68   LYMPHS PCT % 21   MONOS PCT % 7   EOS PCT % 2     Results from last 7 days   Lab Units 21  0505 21  0654   POTASSIUM mmol/L 4 3 4 2   CHLORIDE mmol/L 104 104   CO2 mmol/L 24 27   BUN mg/dL 9 10   CREATININE mg/dL 0 76 0 85   CALCIUM mg/dL 8 9 8 9   ALK PHOS U/L  --  127*   ALT U/L  --  17   AST U/L  -- 26           * I Have Reviewed All Lab Data Listed Above  Recent Cultures (last 7 days):     Results from last 7 days   Lab Units 07/02/21  1134   GRAM STAIN RESULT  No Polys*  1+ Gram negative rods*  Rare Gram positive cocci in pairs*           Imaging: I have personally reviewed pertinent films in PACS  EKG, Pathology, and Other Studies: I have personally reviewed pertinent reports  ** Please Note: Portions of the record may have been created with voice recognition software  Occasional wrong word or "sound a like" substitutions may have occurred due to the inherent limitations of voice recognition software  Read the chart carefully and recognize, using context, where substitutions have occurred   **

## 2021-07-06 NOTE — QUICK NOTE
Patient here for osteomyelitis requiring toe amputation  Hx of dementia at baseline, CAD, making the patient moderate risk for undergoing surgery, however the benefits of the surgery outweigh the risks as untreated osteomyelitis can have worse outcomes

## 2021-07-06 NOTE — PROGRESS NOTES
INTERNAL MEDICINE RESIDENCY PROGRESS NOTE     Name: Chen Coronado   Age & Sex: 80 y o  male   MRN: [id number]  Unit/Bed#: [facility] 320-01   Encounter: [id number]  Team: SOD Team B     PATIENT INFORMATION     Name: Chen Coronado   Age & Sex: 80 y o  male   MRN: [id number]  Hospital Stay Days: 10    ASSESSMENT/PLAN     Principal Problem:    Right second toe ulcer ([facility] )  Active Problems:    Dementia without behavioral disturbance ([place] [id number] )    History of coronary artery disease    Peripheral vascular disease in diabetes mellitus ([place] [id number] )    BPH (benign prostatic hyperplasia)    Type 2 diabetes mellitus, with long-term current use of insulin ([facility])    Diastolic heart failure ([place] [address] )    Hypertension    Anemia    Hearing difficulty of both ears    Actinic dermatitis    Constipation    Dark urine      * Right second toe ulcer ([facility] )  Assessment & Plan  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 confirmed by foot x-ray  Leukocytosis on admission 11 5, now 8 46  CRP of 18 4, ESR 59 on admission  Patient is afebrile, no tachycardia no tachypnea, blood pressure WNL  · Podiatry following, appreciate recommendations  Patient and POA Shiela Key) now agreeable to amputation of right 2nd toe scheduled for 7/8   · ID consulted, recommending holding antibiotic therapy for 48-72 hours and biopsy of the right 2nd toe ulcer to help guide and optimized therapy  · Culture of toe ulcer taken 7/2 -> 4+ pseudomonas, 2+ MRSA  · Patient started on vancomycin/cefepime as per ID recommendations  · Previous plan for IV abx treatment changed, PICC placement cancelled, now proceeding with amputation, will continue IV antibiotics for now  · Daily CBC  · Monitor vitals  · Wound care  · P r n   Tylenol      Dark urine  Assessment & Plan  Noted in suprapubic cathter bag  UA unreliable due to interfering substance  Received IV hydration  Appears to have resolved  Continue to monitor vitals    Constipation  Assessment & Plan  Unknown last bowel movement on admission, most recent bowel movement 7/5  · Miralax 17 g daily   · Senokot daily  · Dulcolax 10 mg suppository PRN      Actinic dermatitis  Assessment & Plan  Attending admitted noted on admission including scald and different areas mainly bilateral groin and right lower extremity  · Clotrimazole cream  · Wound care consult    Hearing difficulty of both ears  Assessment & Plan  Hearing difficulty noted on admission most likely presbycusis  Anemia  Assessment & Plan  History of anemia, hemoglobin baseline 11-13, hemoglobin stable  · Continue home ferrous sulfate 325 ; will give every other day instead of daily while inpatient  · Bowel regimen daily  · May need MMA, folate, iron panel as outpatient  · Continue to monitor daily CBC      Hypertension  Assessment & Plan  History of hypertension, blood pressure well controlled on admission 130s over 60s  · Continue home Lopressor 25 b i d   · Continue Bumex 0 5 mg daily - diastolic heart failure  · Monitor vitals    Diastolic heart failure (HCC)  Assessment & Plan  Wt Readings from Last 3 Encounters:   07/06/21 56 4 kg (124 lb 5 4 oz)   03/10/20 77 1 kg (170 lb)   09/10/19 77 1 kg (170 lb)     History of chronic diastolic heart failure, right lower extremity grade 1 edema on admission, denies shortness of breath, CTAB on admission  Plan  · Continue home Lopressor 25 b i d   · Continue home Bumex 0 5 mg q d    · Daily in/out  · Cardiac diet, Na <2G and <2L fluid     Type 2 diabetes mellitus, with long-term current use of insulin [facility])  Assessment & Plan  Lab Results   Component Value Date    HGBA1C 6 9 (H) 06/26/2021       Recent Labs     07/05/21  1124 07/05/21  1629 07/05/21  2102 07/06/21  0631   POCGLU 194* 113 144* 126       Blood Sugar Average: Last 72 hrs:  (P) 132 [id number]   Relatively controlled H A1c 6 9  Home medications include Lantus 12 units at noon, and metformin 500  · Hold metformin while inpatient  · Decreased Lantus to 6 units in setting of decreased oral intake and sugars in 's  · Monitor glucose and adjust as needed    BPH (benign prostatic hyperplasia)  Assessment & Plan  Known history of large prostate, came with suprapubic Hasy in place  · Continue home Flomax 0 4 mg daily  · Consider holding Myrbetriq given the suprapubic Hays in place    Peripheral vascular disease in diabetes mellitus [facility])  Assessment & Plan  S/p L BKA  6/27 Lower limb arterial duplex - Diffuse disease noted throughout the femoropopliteal arteries without  significant focal stenosis  Evidence suggestive of Tibioperoneal occlusive disease  Ankle/Brachial index: Non-compressible  Metatarsal pressure and Great toe pressure unobtainable secondary to attenuated PPG waveform  PVR/ PPG tracings are dampened  · Continue ASA 81 mg daily  · Continue Plavix 75 mg daily  · See 2nd right toe ulcer assessment and plan    History of coronary artery disease  Assessment & Plan  History of coronary artery disease, denies any chest pain or palpitation on admission    Plan  · Continue aspirin 81 mg daily  · Continue Plavix 7 mg daily  · Continue atorvastatin 10 mg daily    Dementia without behavioral disturbance (HCC)  Assessment & Plan  History of dementia without behavioral disturbance, old history of stroke, AO x1 to self, baseline and stable  His niece, Inge Thomas, is POA  Contact at [phone]       Disposition: Continue current level of care with plan for amputation of 2nd digit of right foot on 7/8 per podiatry  SUBJECTIVE     Patient seen and examined  No acute events overnight  This morning, [facility] had no complaints  He was oriented to self and place and answered most questions with "yes/no" responses  His visiting nurse stated that he is aware and agreeable with the plan to amputate the toe this week  He denied HA, chest pain, N/V, abdominal pain, foot pain, fever/chills      OBJECTIVE     Vitals:    07/06/21 0021 07/06/21 0513 07/06/21 0700 07/06/21 0933   BP: 105/58  113/53 119/57   BP Location: Left arm  Right arm    Pulse: 77  83 92   Resp: 17  19    Temp: 98 5 °F (36 9 °C)  97 8 °F (36 6 °C)    TempSrc: Oral  Oral    SpO2: 97%  100%    Weight:  56 4 kg (124 lb 5 4 oz)     Height:          Temperature:   Temp (24hrs), Av 9 °F (36 6 °C), Min:97 4 °F (36 3 °C), Max:98 5 °F (36 9 °C)    Temperature: 97 8 °F (36 6 °C)  Intake & Output:  I/O        07 -  07 07 -  07 07 -  0700    P  O  941 236     Total Intake(mL/kg) 941 (15) 236 (4 2)     Urine (mL/kg/hr) 1800 (1 2) 1400 (1)     Total Output 1800 1400     Hmt -994 -2273                Weights:   IBW (Ideal Body Weight): 61 5 kg    Body mass index is 20 69 kg/m²  Weight (last 2 days)     Date/Time   Weight    21 0513   56 4 (124 34)    21 0550   62 9 (138 67)    21 0600   62 9 (138 67)            Physical Exam  Constitutional:       General: He is not in acute distress  Appearance: He is well-developed  Cardiovascular:      Rate and Rhythm: Normal rate and regular rhythm  Pulses:           Dorsalis pedis pulses are 1+ on the right side  Left dorsalis pedis pulse not accessible  Posterior tibial pulses are 0 on the right side  Left posterior tibial pulse not accessible  Heart sounds: Normal heart sounds  Pulmonary:      Effort: Pulmonary effort is normal       Breath sounds: Normal breath sounds  Abdominal:      General: Bowel sounds are normal       Palpations: Abdomen is soft  Tenderness: There is no abdominal tenderness  Skin:     General: Skin is warm and dry  Capillary Refill: Capillary refill takes less than 2 seconds  Neurological:      Comments: Oriented to place and self only        LABORATORY DATA     Labs: I have personally reviewed pertinent reports    Results from last 7 days   Lab Units 21  0505 21  0539 21  0654 21  0540 21  0625   WBC Thousand/uL 8 58 8 48 9 14 9 09 Nare Daily    insulin glargine (LANTUS) subcutaneous injection 6 Units 0 06 mL  6 Units Subcutaneous Daily    insulin lispro (HumaLOG) 100 units/mL subcutaneous injection 1-5 Units  1-5 Units Subcutaneous 4x Daily (AC & HS)    melatonin tablet 3 mg  3 mg Oral HS    metoprolol tartrate (LOPRESSOR) tablet 25 mg  25 mg Oral Q12H AGGIE    pantoprazole (PROTONIX) EC tablet 40 mg  40 mg Oral BID    polyethylene glycol (MIRALAX) packet 17 g  17 g Oral Daily    saccharomyces boulardii (FLORASTOR) capsule 250 mg  250 mg Oral BID    senna-docusate sodium (SENOKOT S) 8 6-50 mg per tablet 2 tablet  2 tablet Oral HS    tamsulosin (FLOMAX) capsule 0 4 mg  0 4 mg Oral Daily With Dinner    vancomycin (VANCOCIN) IVPB (premix in dextrose) 750 mg 150 mL  750 mg Intravenous Q12H       VTE Pharmacologic Prophylaxis: Enoxaparin (Lovenox)  VTE Mechanical Prophylaxis: sequential compression device    Portions of the record may have been created with voice recognition software  Occasional wrong word or "sound a like" substitutions may have occurred due to the inherent limitations of voice recognition software    Read the chart carefully and recognize, using context, where substitutions have occurred   ==  [address]  MS4

## 2021-07-06 NOTE — CONSULTS
Consultation - Vascular Surgery  Antonio Cardenas 80 y o  male MRN: [id number]  Unit/Bed#: [facility] 320-01 Encounter: [id number]        Assessment/Plan     Assessment:  89M w/ R 2nd digit dry gangrene, hx of L BKA and known PAD    Vascular Exam:  RLE - Palp Fem, Doppler DP/PT/Peroneal  LLE - L BKA, Palp Fem    Plan:  Care per primary  NPO for planned arteriogram on Thursday 7/8  Continue ASA/Plavix/Lipitor    Wound care per podiatry    Consult to IR for arteriogram    History of Present Illness     HPI:  Antonio Cardenas is a 80 y o  male who presents with 2nd toe on right foot dry gangrene  Patient is wheelchair bound with known PAD and and history of CVA and CAD  He does have dementia and his daughter is his POA  He denies fevers or chills  Review of Systems   Constitutional: Negative  HENT: Negative  Eyes: Negative  Respiratory: Negative  Cardiovascular: Negative  Gastrointestinal: Negative  Genitourinary: Negative  Musculoskeletal: Negative  Skin: Negative  Allergic/Immunologic: Negative  Neurological: Negative  Hematological: Negative  Psychiatric/Behavioral: Negative  Historical Information   Past Medical History:   Diagnosis Date    Anemia     D[facility] )     Diabetes mellitus ([facility] )     Diastolic heart failure (HCC)     Hyperlipidemia     Hypertension     Osteoarthritis     PVD (peripheral vascular disease) ([facility])      Past Surgical History:   Procedure Laterality Date    AMPUTATION Left     left bka     Social History   Social History     Substance and Sexual Activity   Alcohol Use No     Social History     Substance and Sexual Activity   Drug Use Never     Social History     Tobacco Use   Smoking Status Never Smoker   Smokeless Tobacco Never Used     Family History: History reviewed  No pertinent family history  Meds/Allergies   PTA meds:   Prior to Admission Medications   Prescriptions Last Dose Informant Patient Reported? Taking?    INSULIN SYRINGE  5CC/28G 28G X 1/2" 0 5 ML MISC   Yes No   Sig: Monoject Insulin Safety Syringe 0 5 mL 29 gauge x 1/2"   Menthol-Zinc Oxide (CALMOSEPTINE EX)   Yes No   Sig: Apply 1 application topically   Menthol-Zinc Oxide (RISAMINE EX)   Yes No   Sig: Apply topically   Mirabegron ER (MYRBETRIQ) 50 MG TB24   Yes No   Sig: Take by mouth   NON FORMULARY   Yes No   Si application as needed   acetaminophen (TYLENOL) 500 mg tablet   Yes No   Sig: Take 500 mg by mouth every 6 (six) hours as needed for mild pain   aspirin (ECOTRIN LOW STRENGTH) 81 mg EC tablet   Yes No   Sig: Take 81 mg by mouth daily   bumetanide (BUMEX) 0 5 MG tablet   Yes No   Sig: Take 0 5 mg by mouth daily   cholecalciferol (VITAMIN D3) 1,000 units tablet   Yes No   Sig: Take 1,000 Units by mouth daily   clopidogrel (PLAVIX) 75 mg tablet   Yes No   Sig: Take 75 mg by mouth daily   ferrous sulfate 325 (65 Fe) mg tablet   Yes No   Sig: Take 325 mg by mouth daily with breakfast   fluticasone (FLONASE) 50 mcg/act nasal spray   Yes No   Sig: fluticasone propionate 50 mcg/actuation nasal spray,suspension   glucose 40 %   Yes No   Sig: Take 15 g by mouth once   glucose blood test strip   Yes No   Sig: test blood sugar & accucheck fasting at 7am and 2 hours after   insulin glargine (LANTUS) 100 units/mL subcutaneous injection   Yes No   Sig: Inject under the skin daily at bedtime   ketoconazole (NIZORAL) 2 % cream   Yes No   Sig: Apply topically daily   metFORMIN (GLUCOPHAGE) 500 mg tablet   Yes No   Sig: Take 500 mg by mouth daily with breakfast   nystatin-triamcinolone (MYCOLOG-II) ointment   Yes No   Sig: Apply 1 application topically 2 (two) times a day   pantoprazole (PROTONIX) 40 mg tablet   Yes No   Sig: Take 40 mg by mouth 2 (two) times a day   polyethylene glycol (GLYCOLAX) powder   No No   Sig: Take 17 g by mouth daily   tamsulosin (FLOMAX) 0 4 mg   Yes No   Si 4 mg      Facility-Administered Medications: None     No Known Allergies    Objective   First Vitals: Blood Pressure: 130/68 (06/26/21 1645)  Pulse: 88 (06/26/21 1645)  Temperature: 97 9 °F (36 6 °C) (06/26/21 1645)  Temp Source: Oral (06/26/21 1645)  Respirations: 16 (06/26/21 1645)  Height: 5' 5" (165 1 cm) (06/26/21 2223)  Weight - Scale: 79 4 kg (175 lb 0 7 oz) (06/26/21 2058)  SpO2: 99 % (06/26/21 1645)    Current Vitals:   Blood Pressure: 96/67 (07/06/21 1500)  Pulse: 86 (07/06/21 1500)  Temperature: (!) 97 4 °F (36 3 °C) (07/06/21 1500)  Temp Source: Oral (07/06/21 1500)  Respirations: 20 (07/06/21 1500)  Height: 5' 5" (165 1 cm) (06/26/21 2223)  Weight - Scale: 56 4 kg (124 lb 5 4 oz) (07/06/21 0513)  SpO2: 99 % (07/06/21 1500)      Intake/Output Summary (Last 24 hours) at 7/6/2021 1725  Last data filed at 7/6/2021 1300  Gross per 24 hour   Intake 595 ml   Output 1000 ml   Net -405 ml       Invasive Devices     Peripheral Intravenous Line            Peripheral IV 07/04/21 Dorsal (posterior); Left Forearm 2 days          Drain            Suprapubic Catheter -- days                Physical Exam  Constitutional:       General: He is not in acute distress  Appearance: He is not toxic-appearing  HENT:      Head: Normocephalic  Nose: Nose normal       Mouth/Throat:      Mouth: Mucous membranes are dry  Eyes:      General: No scleral icterus  Cardiovascular:      Rate and Rhythm: Normal rate  Pulmonary:      Effort: Pulmonary effort is normal  No respiratory distress  Abdominal:      General: There is no distension  Palpations: Abdomen is soft  Musculoskeletal:      Cervical back: Neck supple  Skin:     General: Skin is warm  Neurological:      General: No focal deficit present  Mental Status: He is alert  Mental status is at baseline  Psychiatric:         Mood and Affect: Mood normal          Vascular Exam:  RLE - Palp Fem, Doppler DP/PT/Peroneal  Right foot with sensation and motor and baseline (motor is minimal however not acute)  LLE - L BKA, Palp Fem    Lab Results:    I have personally reviewed pertinent lab results  , CBC:   Lab Results   Component Value Date    WBC 8 58 07/06/2021    HGB 11 2 (L) 07/06/2021    HCT 34 7 (L) 07/06/2021    MCV 96 07/06/2021     07/06/2021    MCH 31 1 07/06/2021    MCHC 32 3 07/06/2021    RDW 12 8 07/06/2021    MPV 10 0 07/06/2021    NRBC 0 07/06/2021   , CMP:   Lab Results   Component Value Date    SODIUM 133 (L) 07/06/2021    K 4 3 07/06/2021     07/06/2021    CO2 24 07/06/2021    BUN 9 07/06/2021    CREATININE 0 76 07/06/2021    CALCIUM 8 9 07/06/2021    EGFR 79 07/06/2021     Imaging: I have personally reviewed pertinent reports  and I have personally reviewed pertinent films in PACS  EKG, Pathology, and Other Studies: I have personally reviewed pertinent reports     and I have personally reviewed pertinent films in PACS    Code Status: Level 3 - DNAR and DNI  Advance Directive and Living Will:      Power of :    POLST:

## 2021-07-07 LAB
ANION GAP SERPL CALCULATED.3IONS-SCNC: 3 MMOL/L (ref 4–13)
BASOPHILS # BLD AUTO: 0.05 THOUSANDS/ΜL (ref 0–0.1)
BASOPHILS NFR BLD AUTO: 1 % (ref 0–1)
BUN SERPL-MCNC: 10 MG/DL (ref 5–25)
CALCIUM SERPL-MCNC: 9 MG/DL (ref 8.3–10.1)
CHLORIDE SERPL-SCNC: 103 MMOL/L (ref 100–108)
CO2 SERPL-SCNC: 29 MMOL/L (ref 21–32)
CREAT SERPL-MCNC: 0.82 MG/DL (ref 0.6–1.3)
EOSINOPHIL # BLD AUTO: 0.21 THOUSAND/ΜL (ref 0–0.61)
EOSINOPHIL NFR BLD AUTO: 2 % (ref 0–6)
ERYTHROCYTE [DISTWIDTH] IN BLOOD BY AUTOMATED COUNT: 13 % (ref 11.6–15.1)
GFR SERPL CREATININE-BSD FRML MDRD: 77 ML/MIN/1.73SQ M
GLUCOSE SERPL-MCNC: 113 MG/DL (ref 65–140)
GLUCOSE SERPL-MCNC: 128 MG/DL (ref 65–140)
GLUCOSE SERPL-MCNC: 132 MG/DL (ref 65–140)
GLUCOSE SERPL-MCNC: 137 MG/DL (ref 65–140)
GLUCOSE SERPL-MCNC: 169 MG/DL (ref 65–140)
GLUCOSE SERPL-MCNC: 174 MG/DL (ref 65–140)
HCT VFR BLD AUTO: 34.8 % (ref 36.5–49.3)
HGB BLD-MCNC: 11.4 G/DL (ref 12–17)
IMM GRANULOCYTES # BLD AUTO: 0.05 THOUSAND/UL (ref 0–0.2)
IMM GRANULOCYTES NFR BLD AUTO: 1 % (ref 0–2)
LYMPHOCYTES # BLD AUTO: 1.85 THOUSANDS/ΜL (ref 0.6–4.47)
LYMPHOCYTES NFR BLD AUTO: 20 % (ref 14–44)
MCH RBC QN AUTO: 30.8 PG (ref 26.8–34.3)
MCHC RBC AUTO-ENTMCNC: 32.8 G/DL (ref 31.4–37.4)
MCV RBC AUTO: 94 FL (ref 82–98)
MONOCYTES # BLD AUTO: 0.59 THOUSAND/ΜL (ref 0.17–1.22)
MONOCYTES NFR BLD AUTO: 6 % (ref 4–12)
NEUTROPHILS # BLD AUTO: 6.49 THOUSANDS/ΜL (ref 1.85–7.62)
NEUTS SEG NFR BLD AUTO: 70 % (ref 43–75)
NRBC BLD AUTO-RTO: 0 /100 WBCS
PLATELET # BLD AUTO: 382 THOUSANDS/UL (ref 149–390)
PMV BLD AUTO: 9.7 FL (ref 8.9–12.7)
POTASSIUM SERPL-SCNC: 4 MMOL/L (ref 3.5–5.3)
RBC # BLD AUTO: 3.7 MILLION/UL (ref 3.88–5.62)
SODIUM SERPL-SCNC: 135 MMOL/L (ref 136–145)
WBC # BLD AUTO: 9.24 THOUSAND/UL (ref 4.31–10.16)

## 2021-07-07 PROCEDURE — 99232 SBSQ HOSP IP/OBS MODERATE 35: CPT | Performed by: PODIATRIST

## 2021-07-07 PROCEDURE — 99223 1ST HOSP IP/OBS HIGH 75: CPT | Performed by: SURGERY

## 2021-07-07 PROCEDURE — 82948 REAGENT STRIP/BLOOD GLUCOSE: CPT

## 2021-07-07 PROCEDURE — 99232 SBSQ HOSP IP/OBS MODERATE 35: CPT | Performed by: INTERNAL MEDICINE

## 2021-07-07 PROCEDURE — 85025 COMPLETE CBC W/AUTO DIFF WBC: CPT | Performed by: STUDENT IN AN ORGANIZED HEALTH CARE EDUCATION/TRAINING PROGRAM

## 2021-07-07 PROCEDURE — NC001 PR NO CHARGE: Performed by: SURGERY

## 2021-07-07 PROCEDURE — 80048 BASIC METABOLIC PNL TOTAL CA: CPT | Performed by: STUDENT IN AN ORGANIZED HEALTH CARE EDUCATION/TRAINING PROGRAM

## 2021-07-07 RX ORDER — ATORVASTATIN CALCIUM 40 MG/1
40 TABLET, FILM COATED ORAL
Status: DISCONTINUED | OUTPATIENT
Start: 2021-07-07 | End: 2021-07-07

## 2021-07-07 RX ORDER — ATORVASTATIN CALCIUM 40 MG/1
40 TABLET, FILM COATED ORAL
Status: DISCONTINUED | OUTPATIENT
Start: 2021-07-07 | End: 2021-07-13 | Stop reason: HOSPADM

## 2021-07-07 RX ORDER — SODIUM CHLORIDE 9 MG/ML
50 INJECTION, SOLUTION INTRAVENOUS CONTINUOUS
Status: DISCONTINUED | OUTPATIENT
Start: 2021-07-08 | End: 2021-07-09

## 2021-07-07 RX ADMIN — INSULIN GLARGINE 6 UNITS: 100 INJECTION, SOLUTION SUBCUTANEOUS at 08:56

## 2021-07-07 RX ADMIN — VANCOMYCIN HYDROCHLORIDE 750 MG: 750 INJECTION, SOLUTION INTRAVENOUS at 22:47

## 2021-07-07 RX ADMIN — FERROUS SULFATE TAB 325 MG (65 MG ELEMENTAL FE) 325 MG: 325 (65 FE) TAB at 08:58

## 2021-07-07 RX ADMIN — Medication 1000 UNITS: at 08:57

## 2021-07-07 RX ADMIN — POLYETHYLENE GLYCOL 3350 17 G: 17 POWDER, FOR SOLUTION ORAL at 08:58

## 2021-07-07 RX ADMIN — METOPROLOL TARTRATE 25 MG: 25 TABLET, FILM COATED ORAL at 22:00

## 2021-07-07 RX ADMIN — CLOTRIMAZOLE: 0.01 CREAM TOPICAL at 08:59

## 2021-07-07 RX ADMIN — ENOXAPARIN SODIUM 40 MG: 40 INJECTION SUBCUTANEOUS at 08:56

## 2021-07-07 RX ADMIN — MELATONIN TAB 3 MG 3 MG: 3 TAB at 22:00

## 2021-07-07 RX ADMIN — DOCUSATE SODIUM AND SENNOSIDES 2 TABLET: 8.6; 5 TABLET ORAL at 22:00

## 2021-07-07 RX ADMIN — VANCOMYCIN HYDROCHLORIDE 750 MG: 750 INJECTION, SOLUTION INTRAVENOUS at 11:46

## 2021-07-07 RX ADMIN — ASPIRIN 81 MG: 81 TABLET, COATED ORAL at 08:57

## 2021-07-07 RX ADMIN — CEFEPIME HYDROCHLORIDE 2000 MG: 2 INJECTION, POWDER, FOR SOLUTION INTRAVENOUS at 08:56

## 2021-07-07 RX ADMIN — TAMSULOSIN HYDROCHLORIDE 0.4 MG: 0.4 CAPSULE ORAL at 17:30

## 2021-07-07 RX ADMIN — CLOPIDOGREL BISULFATE 75 MG: 75 TABLET ORAL at 08:58

## 2021-07-07 RX ADMIN — PANTOPRAZOLE SODIUM 40 MG: 40 TABLET, DELAYED RELEASE ORAL at 17:30

## 2021-07-07 RX ADMIN — METOPROLOL TARTRATE 25 MG: 25 TABLET, FILM COATED ORAL at 08:57

## 2021-07-07 RX ADMIN — INSULIN LISPRO 1 UNITS: 100 INJECTION, SOLUTION INTRAVENOUS; SUBCUTANEOUS at 21:59

## 2021-07-07 RX ADMIN — ATORVASTATIN CALCIUM 40 MG: 40 TABLET, FILM COATED ORAL at 17:30

## 2021-07-07 RX ADMIN — CEFEPIME HYDROCHLORIDE 2000 MG: 2 INJECTION, POWDER, FOR SOLUTION INTRAVENOUS at 21:59

## 2021-07-07 RX ADMIN — BUMETANIDE 0.5 MG: 0.5 TABLET ORAL at 17:30

## 2021-07-07 RX ADMIN — CLOTRIMAZOLE: 0.01 CREAM TOPICAL at 17:31

## 2021-07-07 RX ADMIN — INSULIN LISPRO 1 UNITS: 100 INJECTION, SOLUTION INTRAVENOUS; SUBCUTANEOUS at 11:46

## 2021-07-07 RX ADMIN — Medication 250 MG: at 09:00

## 2021-07-07 RX ADMIN — PANTOPRAZOLE SODIUM 40 MG: 40 TABLET, DELAYED RELEASE ORAL at 08:57

## 2021-07-07 RX ADMIN — FLUTICASONE PROPIONATE 1 SPRAY: 50 SPRAY, METERED NASAL at 08:59

## 2021-07-07 RX ADMIN — Medication 250 MG: at 17:30

## 2021-07-07 RX ADMIN — BUMETANIDE 0.5 MG: 0.5 TABLET ORAL at 08:58

## 2021-07-07 NOTE — PROGRESS NOTES
Progress Note - Vascular Surgery   Maynor Olivier 80 y o  male MRN: [id number]  Unit/Bed#: [facility] 320-01 Encounter: [id number]    Assessment:  70-year-old male with right 2nd digit dry gangrene, history of left BKA, dementia, type 2 diabetes, and known PAD    Vascular exam:  RLE-palpable femoral pulse, dopplerable DP/PT/peroneal  LLE-left BKA, palpable femoral    Plan:  -NPO midnight for arteriogram, possibly 07/08/2021 with IR  -continue ASA/Plavix/Lipitor  -wound care per Podiatry  -rest of care per primary team    Subjective/Objective   Subjective:  No acute events overnight  Patient denies pain to his right lower extremity  Objective:     Blood pressure 140/68, pulse 92, temperature 97 6 °F (36 4 °C), temperature source Oral, resp  rate 22, height 5' 5" (1 651 m), weight 56 4 kg (124 lb 5 4 oz), SpO2 99 %  ,Body mass index is 20 69 kg/m²  Intake/Output Summary (Last 24 hours) at 7/7/2021 0621  Last data filed at 7/7/2021 0506  Gross per 24 hour   Intake 1095 ml   Output 2800 ml   Net -1705 ml       Invasive Devices     Peripheral Intravenous Line            Peripheral IV 07/04/21 Dorsal (posterior); Left Forearm 2 days          Drain            Suprapubic Catheter -- days                Physical Exam: Physical Exam  Constitutional:       Comments: Not oriented to time or place   HENT:      Head: Normocephalic  Cardiovascular:      Rate and Rhythm: Normal rate and regular rhythm  Pulmonary:      Effort: Pulmonary effort is normal    Abdominal:      General: Abdomen is flat  There is no distension  Palpations: Abdomen is soft  Musculoskeletal:      Cervical back: Normal range of motion  Comments: Patient unable to dorsiflex or plantar flex right foot and ankle  Skin:     General: Skin is warm  Comments: Right dorsal 2nd digit ulceration probing to bone  Clinically, no signs of soft tissue infection  Neurological:      Mental Status: He is alert  He is disoriented

## 2021-07-07 NOTE — PROGRESS NOTES
Progress Note - Infectious Disease   Keyon Valdez 80 y o  male MRN: [id number]  Unit/Bed#: [facility] 320-01 Encounter: [id number]      Impression/Recommendations:  1  Right 2nd toe osteomyelitis, confirmed but foot x-ray   Patient is status post bone biopsy, with growth of MRSA and Pseudomonas   Antibiotic regimen will be modified  Shanta Hocherelle advanced age, underlying PVD and somewhat poorly controlled DM, probability of toe salvage is essentially nil  Fortunately, it appears the patient's POA is finally agreeable to toe amputation  This is the right decision  Patient has high risk for long-term IV antibiotic toxicity and very low probability of cure  Continue vancomycin/cefepime for now  Plan for toe amputation noted  Likely discontinue antibiotics after toe amputation      2  Right 2nd toe ulcer, likely multifactorial, with DM and PVD contributing   I doubt the ulcer will heal with local care  Local ulcer care per Podiatry  Plan for toe amputation noted      3  DM, poorly controlled, with hyperglycemia and elevated hemoglobin A1c   This plays a big role in poor wound healing and infection  Patient needs better long-term control of blood sugar  Management per primary service      4  PVD   Plan for arteriogram and optimization of PVD noted      Discussed with patient in detail regarding the above plan      Antibiotics:  Vancomycin/cefepime  # 3      Subjective:  Patient is comfortable  No pain in toe or foot  Mental status stable, with mild confusion  Temperature stays down   No chills    He is tolerating antibiotic well   No nausea, vomiting or diarrhea      Objective:  Vitals:  Temp:  [97 4 °F (36 3 °C)-97 6 °F (36 4 °C)] 97 6 °F (36 4 °C)  HR:  [] 101  Resp:  [19-22] 19  BP: ()/(60-68) 123/60  SpO2:  [98 %-99 %] 98 %  Temp (24hrs), Av 5 °F (36 4 °C), Min:97 4 °F (36 3 °C), Max:97 6 °F (36 4 °C)  Current: Temperature: 97 6 °F (36 4 °C)    Physical Exam:     General: Awake, alert, cooperative, no distress  Stable mild disorientation  Neck:  Supple  No mass  No lymphadenopathy  Lungs: Expansion symmetric, no rales, no wheezing, respirations unlabored  Heart:  Mildly tachycardic with regular rhythm, S1 and S2 normal, no murmur  Abdomen: Soft, nondistended, non-tender, bowel sounds active all four quadrants, no masses, no organomegaly  Extremities: No edema  Stable right 2nd toe wound, without purulence  Stable mild erythema  No warmth  Nontender  Skin:  No rash  Neuro: Moves all extremities  Invasive Devices     Peripheral Intravenous Line            Peripheral IV 07/04/21 Dorsal (posterior); Left Forearm 2 days          Drain            Suprapubic Catheter -- days                Labs studies:   I have personally reviewed pertinent labs  Results from last 7 days   Lab Units 07/07/21  0428 07/06/21  0505 07/05/21  0539 07/04/21  0654   POTASSIUM mmol/L 4 0 4 3 4 0 4 2   CHLORIDE mmol/L 103 104 102 104   CO2 mmol/L 29 24 30 27   BUN mg/dL 10 9 10 10   CREATININE mg/dL 0 82 0 76 0 81 0 85   EGFR ml/min/1 73sq m 77 79 77 76   CALCIUM mg/dL 9 0 8 9 9 0 8 9   AST U/L  --   --   --  26   ALT U/L  --   --   --  17   ALK PHOS U/L  --   --   --  127*     Results from last 7 days   Lab Units 07/07/21  0428 07/06/21  0505 07/05/21  0539   WBC Thousand/uL 9 24 8 58 8 48   HEMOGLOBIN g/dL 11 4* 11 2* 11 9*   PLATELETS Thousands/uL 382 348 348     Results from last 7 days   Lab Units 07/02/21  1134   GRAM STAIN RESULT  No Polys*  1+ Gram negative rods*  Rare Gram positive cocci in pairs*       Imaging Studies:   I have personally reviewed pertinent imaging study reports and images in PACS  EKG, Pathology, and Other Studies:   I have personally reviewed pertinent reports

## 2021-07-07 NOTE — PROGRESS NOTES
INTERNAL MEDICINE RESIDENCY PROGRESS NOTE     Name: Mandy Suazo   Age & Sex: 80 y o  male   MRN: [id number]  Unit/Bed#: [facility] 320-01   Encounter: [id number]  Team: SOD Team B     PATIENT INFORMATION     Name: Mandy Suazo   Age & Sex: 80 y o  male   MRN: [id number]  Hospital Stay Days: 11    ASSESSMENT/PLAN     Principal Problem:    Right second toe ulcer ([facility] )  Active Problems:    Dementia without behavioral disturbance ([place] [address] )    History of coronary artery disease    Peripheral vascular disease in diabetes mellitus ([facility] )    BPH (benign prostatic hyperplasia)    Type 2 diabetes mellitus, with long-term current use of insulin ([facility])    Diastolic heart failure ([place] [address] )    Hypertension    Anemia    Hearing difficulty of both ears    Actinic dermatitis    Constipation    Dark urine      * Right second toe ulcer ([facility] )  Assessment & Plan  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 confirmed by foot x-ray  Leukocytosis on admission 11 5, now 8 46  CRP of 18 4, ESR 59 on admission  Patient is afebrile, no tachycardia no tachypnea, blood pressure WNL  · Podiatry following, appreciate recommendations  Patient and SOLIS Lr) now agreeable to plan for amputation of right 2nd toe, arteriogram scheduled for 7/8  · ID consulted, recommending holding antibiotic therapy for 48-72 hours and biopsy of the right 2nd toe ulcer to help guide and optimized therapy  · Culture of toe ulcer taken 7/2 -> 4+ pseudomonas, 2+ MRSA  · Patient started on vancomycin/cefepime as per ID recommendations  · Previous plan for IV abx treatment changed, PICC placement cancelled, now proceeding with arteriogram with possible amputation, will continue IV antibiotics for now  · Daily CBC  · Monitor vitals  · Wound care  · P r n   Tylenol      Dark urine  Assessment & Plan  Noted in suprapubic cathter bag  UA unreliable due to interfering substance  Received IV hydration  Appears to have resolved  Continue to monitor vitals    Constipation  Assessment & Plan  Unknown last bowel movement on admission, most recent bowel movement 7/5  · Miralax 17 g daily   · Senokot daily  · Dulcolax 10 mg suppository PRN      Actinic dermatitis  Assessment & Plan  Attending admitted noted on admission including scald and different areas mainly bilateral groin and right lower extremity  · Clotrimazole cream  · Wound care consult    Hearing difficulty of both ears  Assessment & Plan  Hearing difficulty noted on admission most likely presbycusis  Anemia  Assessment & Plan  History of anemia, hemoglobin baseline 11-13, hemoglobin stable  · Continue home ferrous sulfate 325 ; will give every other day instead of daily while inpatient  · Bowel regimen daily  · May need MMA, folate, iron panel as outpatient  · Continue to monitor daily CBC      Hypertension  Assessment & Plan  History of hypertension, blood pressure well controlled on admission 130s over 60s  · Continue home Lopressor 25 b i d   · Continue Bumex 0 5 mg daily - diastolic heart failure  · Monitor vitals    Diastolic heart failure (HCC)  Assessment & Plan  Wt Readings from Last 3 Encounters:   07/06/21 56 4 kg (124 lb 5 4 oz)   03/10/20 77 1 kg (170 lb)   09/10/19 77 1 kg (170 lb)     History of chronic diastolic heart failure, right lower extremity grade 1 edema on admission, denies shortness of breath, CTAB on admission  Plan  · Continue home Lopressor 25 b i d   · Continue home Bumex 0 5 mg q d    · Daily in/out  · Cardiac diet, Na <2G and <2L fluid     Type 2 diabetes mellitus, with long-term current use of insulin [facility])  Assessment & Plan  Lab Results   Component Value Date    HGBA1C 6 9 (H) 06/26/2021       Recent Labs     07/06/21  1112 07/06/21  1629 07/06/21  2212 07/07/21  0631   POCGLU 239* 126 120 132       Blood Sugar Average: Last 72 hrs:  (P) 005 [id number]   Relatively controlled H A1c 6 9  Home medications include Lantus 12 units at noon, and metformin 500  · Hold metformin while inpatient  · Decreased Lantus to 6 units in setting of decreased oral intake and sugars in 's  · Monitor glucose and adjust as needed    BPH (benign prostatic hyperplasia)  Assessment & Plan  Known history of large prostate, came with suprapubic Hays in place  · Continue home Flomax 0 4 mg daily  · Consider holding Myrbetriq given the suprapubic Hays in place    Peripheral vascular disease in diabetes mellitus [facility])  Assessment & Plan  S/p L BKA  6/27 Lower limb arterial duplex - Diffuse disease noted throughout the femoropopliteal arteries without  significant focal stenosis  Evidence suggestive of Tibioperoneal occlusive disease  Ankle/Brachial index: Non-compressible  Metatarsal pressure and Great toe pressure unobtainable secondary to attenuated PPG waveform  PVR/ PPG tracings are dampened  · Continue ASA 81 mg daily  · Continue Plavix 75 mg daily  · See 2nd right toe ulcer assessment and plan    History of coronary artery disease  Assessment & Plan  History of coronary artery disease, denies any chest pain or palpitation on admission    Plan  · Continue aspirin 81 mg daily  · Continue Plavix 7 mg daily  · Continue atorvastatin 10 mg daily    Dementia without behavioral disturbance (HCC)  Assessment & Plan  History of dementia without behavioral disturbance, old history of stroke, AO x1 to self, baseline and stable  His niece, Mounika Gray, is POA  Contact at [phone]       Disposition: Continue current level of care  Plan for arteriogram 7/8  NPO after midnight  SUBJECTIVE     Patient seen and examined  No acute events overnight  This morning, Keithlin Backers had no complaints  He continues to answer most questions with "yes/no" answers  He is aware of the plan for arteriogram tomorrow per his visiting nurse  He denies HA, chest pain, shortness of breath, N/V, fever/chills, pain       OBJECTIVE     Vitals:    07/06/21 2138 07/06/21 2349 07/07/21 0600 07/07/21 0800   BP: 140/68 123/60   BP Location:    Right arm   Pulse: 103 92  101   Resp:  22  19   Temp:  97 6 °F (36 4 °C)  97 6 °F (36 4 °C)   TempSrc:  Oral  Oral   SpO2:  99%  98%   Weight:   57 2 kg (126 lb 1 7 oz)    Height:          Temperature:   Temp (24hrs), Av 5 °F (36 4 °C), Min:97 4 °F (36 3 °C), Max:97 6 °F (36 4 °C)    Temperature: 97 6 °F (36 4 °C)  Intake & Output:  I/O       / 07 -  0700  07 -  07 07 -  0700    P  O  236 835     I V  (mL/kg)  60 (1)     IV Piggyback  200     Total Intake(mL/kg) 236 (4 2) 1095 (19 1)     Urine (mL/kg/hr) 1400 (1) 2800 (2)     Stool  0     Total Output 1400 2800     Net -1164 -1705            Unmeasured Stool Occurrence  1 x         Weights:   IBW (Ideal Body Weight): 61 5 kg    Body mass index is 20 98 kg/m²  Weight (last 2 days)     Date/Time   Weight    21 0600   57 2 (126 1)    21 0513   56 4 (124 34)    21 0550   62 9 (138 67)            Physical Exam  Vitals and nursing note reviewed  Constitutional:       General: He is not in acute distress  Cardiovascular:      Rate and Rhythm: Normal rate and regular rhythm  Pulses:           Dorsalis pedis pulses are 1+ on the right side  Posterior tibial pulses are 0 on the right side  Heart sounds: Normal heart sounds  Pulmonary:      Effort: Pulmonary effort is normal       Breath sounds: Normal breath sounds  Abdominal:      General: Bowel sounds are normal       Palpations: Abdomen is soft  Musculoskeletal:      Left Lower Extremity: Left leg is amputated below knee  Feet:      Comments: Black ulcer on dorsal aspect of 2nd digit of right foot  Skin:     General: Skin is warm and dry  Capillary Refill: Capillary refill takes less than 2 seconds  Neurological:      Mental Status: Mental status is at baseline  Comments: Oriented only to self and place       LABORATORY DATA     Labs: I have personally reviewed pertinent reports    Results from last 7 days   Lab Units 07/07/21  0428 07/06/21  0505 07/05/21  0539 07/03/21  0540   WBC Thousand/uL 9 24 8 58 8 48 9 09   HEMOGLOBIN g/dL 11 4* 11 2* 11 9* 10 6*   HEMATOCRIT % 34 8* 34 7* 36 4* 33 0*   PLATELETS Thousands/uL 382 348 348 320   NEUTROS PCT % 70 68  --  70   MONOS PCT % 6 7  --  6      Results from last 7 days   Lab Units 07/07/21  0428 07/06/21  0505 07/05/21  0539 07/04/21  0654   POTASSIUM mmol/L 4 0 4 3 4 0 4 2   CHLORIDE mmol/L 103 104 102 104   CO2 mmol/L 29 24 30 27   BUN mg/dL 10 9 10 10   CREATININE mg/dL 0 82 0 76 0 81 0 85   CALCIUM mg/dL 9 0 8 9 9 0 8 9   ALK PHOS U/L  --   --   --  127*   ALT U/L  --   --   --  17   AST U/L  --   --   --  26     Results from last 7 days   Lab Units 07/04/21  0654   MAGNESIUM mg/dL 2 2                        IMAGING & DIAGNOSTIC TESTING     Radiology Results: I have personally reviewed pertinent reports  XR foot 3+ views RIGHT    Result Date: 6/27/2021  Impression: 2nd mid phalanx, 2nd distal phalanx osteomyelitis suspected  The study was marked in [facility] for immediate notification  Workstation performed: [id number]     Other Diagnostic Testing: I have personally reviewed pertinent reports      ACTIVE MEDICATIONS     Current Facility-Administered Medications   Medication Dose Route Frequency    acetaminophen (TYLENOL) tablet 488 mg  488 mg Oral Q6H PRN    aspirin (ECOTRIN LOW STRENGTH) EC tablet 81 mg  81 mg Oral Daily    atorvastatin (LIPITOR) tablet 40 mg  40 mg Oral Daily With Dinner    bisacodyl (DULCOLAX) rectal suppository 10 mg  10 mg Rectal Daily PRN    bumetanide (BUMEX) tablet 0 5 mg  0 5 mg Oral Daily    cefepime (MAXIPIME) 2,000 mg in dextrose 5 % 50 mL IVPB  2,000 mg Intravenous Q12H    cholecalciferol (VITAMIN D3) tablet 1,000 Units  1,000 Units Oral Daily    clopidogrel (PLAVIX) tablet 75 mg  75 mg Oral Daily    clotrimazole (LOTRIMIN) 1 % cream   Topical BID    enoxaparin (LOVENOX) subcutaneous injection 40 mg  40 mg Subcutaneous Daily  ferrous sulfate tablet 325 mg  325 mg Oral Daily With Breakfast    fluticasone (FLONASE) 50 mcg/act nasal spray 1 spray  1 spray Each Nare Daily    insulin glargine (LANTUS) subcutaneous injection 6 Units 0 06 mL  6 Units Subcutaneous Daily    insulin lispro (HumaLOG) 100 units/mL subcutaneous injection 1-5 Units  1-5 Units Subcutaneous 4x Daily (AC & HS)    melatonin tablet 3 mg  3 mg Oral HS    metoprolol tartrate (LOPRESSOR) tablet 25 mg  25 mg Oral Q12H AGGIE    pantoprazole (PROTONIX) EC tablet 40 mg  40 mg Oral BID    polyethylene glycol (MIRALAX) packet 17 g  17 g Oral Daily    saccharomyces boulardii (FLORASTOR) capsule 250 mg  250 mg Oral BID    senna-docusate sodium (SENOKOT S) 8 6-50 mg per tablet 2 tablet  2 tablet Oral HS    tamsulosin (FLOMAX) capsule 0 4 mg  0 4 mg Oral Daily With Dinner    vancomycin (VANCOCIN) IVPB (premix in dextrose) 750 mg 150 mL  750 mg Intravenous Q12H       VTE Pharmacologic Prophylaxis: Enoxaparin (Lovenox)  VTE Mechanical Prophylaxis: sequential compression device    Portions of the record may have been created with voice recognition software  Occasional wrong word or "sound a like" substitutions may have occurred due to the inherent limitations of voice recognition software    Read the chart carefully and recognize, using context, where substitutions have occurred   ==  [address] [facility]  MS4

## 2021-07-07 NOTE — PROGRESS NOTES
Vancomycin IV Pharmacy-to-Dose Consultation    Shirley Prieto is a 80 y o  male who is currently receiving Vancomycin IV with management by the Pharmacy Consult service for the treatment of osteomyelitis, MRSA confirmed    Assessment/Plan:    The patient's chart was reviewed  Renal function is stable  There are no signs or symptoms of nephrotoxicity and/or infusion reactions documented  The following nephrotoxicity factors are present:  Medications: none  Patient-Factors: elderly    Based on todays assessment, will continue current vancomycin (Day # 3) dosing of 750 mg IV Q12H, with a plan for trough to be drawn at 1000 on 07/12  Pharmacy will continue to follow closely for s/sx of nephrotoxicity, infusion reactions and appropriateness of therapy  BMP and CBC will be ordered per protocol  We will continue to follow the patients culture results and clinical progress daily        Marcelle Salmeron, MichaelD

## 2021-07-07 NOTE — PROGRESS NOTES
[facility] Podiatry - Progress Note  Patient: Jostin Emerson 80 y o  male   MRN: [id number]  PCP: Desirae Jimenes MD  Unit/Bed#: [facility] 320-01 Encounter: [id number]  Date Of Visit: 21    ASSESSMENT:    Jostin Emerson is a 80 y o  male with:    1  Right 2nd digit wound, Aparicio 3  2  DM2  3  Dementia       PLAN:    · Awaiting results of RLE angiogram () to guide surgical plan  · Continue local wound care  · Continue Antibiotics per ID  · Elevation on green foam wedges or pillows when non-ambulatory  · Rest of care per primary team     Weight bearing status: WBAT      SUBJECTIVE:     The patient was seen, evaluated, and assessed at bedside today  The patient was awake, alert, and in no acute distress  No acute events overnight  The patient acknowledge our presence but did not speak  Patient denies N/V/F/chills/SOB/CP  OBJECTIVE:     Vitals:   /60 (BP Location: Right arm)   Pulse 101   Temp 97 6 °F (36 4 °C) (Oral)   Resp 19   Ht 5' 5" (1 651 m)   Wt 57 2 kg (126 lb 1 7 oz)   SpO2 98%   BMI 20 98 kg/m²     Temp (24hrs), Av 5 °F (36 4 °C), Min:97 4 °F (36 3 °C), Max:97 6 °F (36 4 °C)      Physical Exam:     General:  Alert, cooperative, and in no distress  Lower extremity exam:  Cardiovascular status at baseline  Neurological status is at baseline  Musculoskeletal status is at baseline  No calf tenderness noted  Wound (1) located right dorsal 2nd PIPJ , measures approximately 1 3 cm x 1 cm x 0 5 cm  with sinus tracking or undermining  Wound bed appears eschar and underlying structures with no drainage  The wound base is 0% red/granular, 5% yellow/fibrous, 95% black/necrotic  Deepest tissue noted in base is bone  Malodor is not noticed  Wound edge appears Attached  Rasheeda-wound skin appears intact, erythematous and fragile  Probe to bone is positive  Signs of infection are present at this time       Clinical Images 21:      Right foot    Additional Data:     Labs:    Results from last 7 days   Lab Units 07/07/21  0428   WBC Thousand/uL 9 24   HEMOGLOBIN g/dL 11 4*   HEMATOCRIT % 34 8*   PLATELETS Thousands/uL 382   NEUTROS PCT % 70   LYMPHS PCT % 20   MONOS PCT % 6   EOS PCT % 2     Results from last 7 days   Lab Units 07/07/21  0428 07/04/21  0654   POTASSIUM mmol/L 4 0 4 2   CHLORIDE mmol/L 103 104   CO2 mmol/L 29 27   BUN mg/dL 10 10   CREATININE mg/dL 0 82 0 85   CALCIUM mg/dL 9 0 8 9   ALK PHOS U/L  --  127*   ALT U/L  --  17   AST U/L  --  26           * I Have Reviewed All Lab Data Listed Above  Recent Cultures (last 7 days):     Results from last 7 days   Lab Units 07/02/21  1134   GRAM STAIN RESULT  No Polys*  1+ Gram negative rods*  Rare Gram positive cocci in pairs*           Imaging: I have personally reviewed pertinent films in PACS  EKG, Pathology, and Other Studies: I have personally reviewed pertinent reports  ** Please Note: Portions of the record may have been created with voice recognition software  Occasional wrong word or "sound a like" substitutions may have occurred due to the inherent limitations of voice recognition software  Read the chart carefully and recognize, using context, where substitutions have occurred   **

## 2021-07-07 NOTE — TELEMEDICINE
e-Consult (IPC)  - Interventional Radiology  Sergio Douglas 80 y o  male MRN: [id number]  Unit/Bed#: [facility] 320-01 Encounter: [id number]    IR has been consulted to evaluate the patient, determine the appropriate procedure, and whether or not a procedure can and should be performed regarding the care of Sergio Douglas  We were consulted by vascular surgery concerning PAD, R 2nd toe gangrene, and to possibly perform a RLE agram with possible intervention if medically appropriate for the patient  IP Consult to IR  Consult performed by: Tamiko Maynard PA-C  Consult ordered by: Oleg Rodriguez MD        07/07/21      Assessment/Recommendation:     80year old male with pmh of left BKA, DM2, PAD, dementia, presenting with right 2nd toe ulcer    Right doppler: Tibioperoneal occlusive disease    - will plan for RLE agram with possible intervention tomorrow, 7/8  - appreciate anesthesia support  - please keep npo after midnight  - Bernice Torres [phone] for consent      Total time spent in review of data, discussion with requesting provider and rendering advice was 25 minutes       Patient or appropriate family member was verbally informed by vascular surgery of this consultative service on their behalf to provide more timely access to specialty care in lieu of an in person consultation  Verbal consent was obtained  Thank you for allowing Interventional Radiology to participate in the care of Sergio Douglas  Please don't hesitate to call or TigerText us with any questions       Tamiko Maynard PA-C

## 2021-07-07 NOTE — CASE MANAGEMENT
Pt remains in hospital with IV antibiotics  Plan is for arteriogram and toe amputation tomorrow  Pt  Has been accepted at [facility] when ready for discharge  Will not d/c on IV antibiotics  CM following

## 2021-07-07 NOTE — PROGRESS NOTES
Vancomycin Pharmacy Consult     Lisandra Main is an 80 y o  male who is currently receiving IV vancomycin for right 2nd toe MRSA OM  Vancomycin Assessment:  1  ID Consult: Yes  2  Cultures:   7/2 Culture Tissue: 4+ Pseudomonas aeruginosa, 2+ MRSA  3  Procalcitonin:   n/a  4  Renal Function:   SCr: 0 82  CrCl: 41 mL/min  UOP: 2 mL/kg/hr  5  Days of Therapy: 3  6  Current Dose: 750 mg IV q12h  7  Last Level: 19 1 (trough 7/6 at 2216)  8  Goal AUC(24h): 400-600  9  Goal Random/Trough: 15-20     Vancomycin Plan:  1  Evaluation: continue current regimen  2  New Dosing: continue 750 mg IV q12h  Predicted Trough / AUC(24h): 18 5 / 582  3  Next Level: trough 7/9 at 1000        Pharmacy will continue to follow closely for s/sx of nephrotoxicity, infusion reactions, and appropriateness of therapy  BMP and CBC will be ordered per protocol  We will continue to follow the patients culture results and clinical progress daily  Inge Gerardo, MichaelD  Internal Medicine Clinical Pharmacist Specialist  [phone]  [facility]/Te[employer]

## 2021-07-08 ENCOUNTER — ANESTHESIA (INPATIENT)
Dept: RADIOLOGY | Facility: HOSPITAL | Age: 86
DRG: 617 | End: 2021-07-08
Payer: MEDICARE

## 2021-07-08 ENCOUNTER — ANESTHESIA EVENT (INPATIENT)
Dept: RADIOLOGY | Facility: HOSPITAL | Age: 86
DRG: 617 | End: 2021-07-08
Payer: MEDICARE

## 2021-07-08 ENCOUNTER — APPOINTMENT (INPATIENT)
Dept: RADIOLOGY | Facility: HOSPITAL | Age: 86
DRG: 617 | End: 2021-07-08
Payer: MEDICARE

## 2021-07-08 PROBLEM — R82.998 DARK URINE: Status: RESOLVED | Noted: 2021-06-30 | Resolved: 2021-07-08

## 2021-07-08 LAB
ANION GAP SERPL CALCULATED.3IONS-SCNC: 5 MMOL/L (ref 4–13)
BASE EXCESS BLDA CALC-SCNC: -3 MMOL/L (ref -2–3)
BUN SERPL-MCNC: 12 MG/DL (ref 5–25)
CALCIUM SERPL-MCNC: 8.9 MG/DL (ref 8.3–10.1)
CHLORIDE SERPL-SCNC: 102 MMOL/L (ref 100–108)
CO2 SERPL-SCNC: 28 MMOL/L (ref 21–32)
CREAT SERPL-MCNC: 0.9 MG/DL (ref 0.6–1.3)
ERYTHROCYTE [DISTWIDTH] IN BLOOD BY AUTOMATED COUNT: 13.1 % (ref 11.6–15.1)
GFR SERPL CREATININE-BSD FRML MDRD: 74 ML/MIN/1.73SQ M
GLUCOSE SERPL-MCNC: 112 MG/DL (ref 65–140)
GLUCOSE SERPL-MCNC: 122 MG/DL (ref 65–140)
GLUCOSE SERPL-MCNC: 127 MG/DL (ref 65–140)
GLUCOSE SERPL-MCNC: 132 MG/DL (ref 65–140)
GLUCOSE SERPL-MCNC: 204 MG/DL (ref 65–140)
GLUCOSE SERPL-MCNC: 210 MG/DL (ref 65–140)
GLUCOSE SERPL-MCNC: 224 MG/DL (ref 65–140)
HCO3 BLDA-SCNC: 23.6 MMOL/L (ref 22–28)
HCT VFR BLD AUTO: 36.1 % (ref 36.5–49.3)
HCT VFR BLD CALC: 43 % (ref 36.5–49.3)
HGB BLD-MCNC: 11.7 G/DL (ref 12–17)
HGB BLDA-MCNC: 14.6 G/DL (ref 12–17)
INR PPP: 1.24 (ref 0.84–1.19)
KCT BLD-ACNC: 189 SEC (ref 89–137)
KCT BLD-ACNC: 201 SEC (ref 89–137)
KCT BLD-ACNC: 237 SEC (ref 89–137)
KCT BLD-ACNC: 243 SEC (ref 89–137)
MCH RBC QN AUTO: 30.9 PG (ref 26.8–34.3)
MCHC RBC AUTO-ENTMCNC: 32.4 G/DL (ref 31.4–37.4)
MCV RBC AUTO: 95 FL (ref 82–98)
PCO2 BLD: 25 MMOL/L (ref 21–32)
PCO2 BLD: 44.7 MM HG (ref 36–44)
PH BLD: 7.33 [PH] (ref 7.35–7.45)
PLATELET # BLD AUTO: 373 THOUSANDS/UL (ref 149–390)
PMV BLD AUTO: 9.8 FL (ref 8.9–12.7)
PO2 BLD: 154 MM HG (ref 75–129)
POTASSIUM BLD-SCNC: 4.3 MMOL/L (ref 3.5–5.3)
POTASSIUM SERPL-SCNC: 3.7 MMOL/L (ref 3.5–5.3)
PROTHROMBIN TIME: 15.6 SECONDS (ref 11.6–14.5)
RBC # BLD AUTO: 3.79 MILLION/UL (ref 3.88–5.62)
SAO2 % BLD FROM PO2: 99 % (ref 60–85)
SODIUM BLD-SCNC: 138 MMOL/L (ref 136–145)
SODIUM SERPL-SCNC: 135 MMOL/L (ref 136–145)
SPECIMEN SOURCE: ABNORMAL
WBC # BLD AUTO: 9.99 THOUSAND/UL (ref 4.31–10.16)

## 2021-07-08 PROCEDURE — 85610 PROTHROMBIN TIME: CPT | Performed by: PHYSICIAN ASSISTANT

## 2021-07-08 PROCEDURE — C1725 CATH, TRANSLUMIN NON-LASER: HCPCS

## 2021-07-08 PROCEDURE — 37232 PR REVASCULARIZE TIBIAL/PERON ARTERY,ANGIOPLASTY EA ADD: CPT | Performed by: RADIOLOGY

## 2021-07-08 PROCEDURE — C1894 INTRO/SHEATH, NON-LASER: HCPCS

## 2021-07-08 PROCEDURE — C1769 GUIDE WIRE: HCPCS

## 2021-07-08 PROCEDURE — 37228 HB TIB/PER REVASC W/TLA: CPT

## 2021-07-08 PROCEDURE — 93005 ELECTROCARDIOGRAM TRACING: CPT

## 2021-07-08 PROCEDURE — 75625 CONTRAST EXAM ABDOMINL AORTA: CPT

## 2021-07-08 PROCEDURE — 047T3ZZ DILATION OF RIGHT PERONEAL ARTERY, PERCUTANEOUS APPROACH: ICD-10-PCS | Performed by: RADIOLOGY

## 2021-07-08 PROCEDURE — 99232 SBSQ HOSP IP/OBS MODERATE 35: CPT | Performed by: INTERNAL MEDICINE

## 2021-07-08 PROCEDURE — C1887 CATHETER, GUIDING: HCPCS

## 2021-07-08 PROCEDURE — 84132 ASSAY OF SERUM POTASSIUM: CPT

## 2021-07-08 PROCEDURE — 75630 X-RAY AORTA LEG ARTERIES: CPT

## 2021-07-08 PROCEDURE — 37246 TRLUML BALO ANGIOP 1ST ART: CPT

## 2021-07-08 PROCEDURE — 047P3ZZ DILATION OF RIGHT ANTERIOR TIBIAL ARTERY, PERCUTANEOUS APPROACH: ICD-10-PCS | Performed by: RADIOLOGY

## 2021-07-08 PROCEDURE — 37224 HB FEM/POPL REVAS W/TLA: CPT

## 2021-07-08 PROCEDURE — 37228 PR REVASCULARIZE TIBIAL/PERON ARTERY,ANGIOPLASTY INITIAL: CPT | Performed by: RADIOLOGY

## 2021-07-08 PROCEDURE — 85014 HEMATOCRIT: CPT

## 2021-07-08 PROCEDURE — NC001 PR NO CHARGE: Performed by: SURGERY

## 2021-07-08 PROCEDURE — 82803 BLOOD GASES ANY COMBINATION: CPT

## 2021-07-08 PROCEDURE — 85347 COAGULATION TIME ACTIVATED: CPT

## 2021-07-08 PROCEDURE — 37247 TRLUML BALO ANGIOP ADDL ART: CPT

## 2021-07-08 PROCEDURE — 76937 US GUIDE VASCULAR ACCESS: CPT

## 2021-07-08 PROCEDURE — 75625 CONTRAST EXAM ABDOMINL AORTA: CPT | Performed by: RADIOLOGY

## 2021-07-08 PROCEDURE — 82947 ASSAY GLUCOSE BLOOD QUANT: CPT

## 2021-07-08 PROCEDURE — 84295 ASSAY OF SERUM SODIUM: CPT

## 2021-07-08 PROCEDURE — C1760 CLOSURE DEV, VASC: HCPCS

## 2021-07-08 PROCEDURE — 82948 REAGENT STRIP/BLOOD GLUCOSE: CPT

## 2021-07-08 PROCEDURE — 75710 ARTERY X-RAYS ARM/LEG: CPT | Performed by: RADIOLOGY

## 2021-07-08 PROCEDURE — 85027 COMPLETE CBC AUTOMATED: CPT | Performed by: STUDENT IN AN ORGANIZED HEALTH CARE EDUCATION/TRAINING PROGRAM

## 2021-07-08 PROCEDURE — 80048 BASIC METABOLIC PNL TOTAL CA: CPT | Performed by: STUDENT IN AN ORGANIZED HEALTH CARE EDUCATION/TRAINING PROGRAM

## 2021-07-08 RX ORDER — ONDANSETRON 2 MG/ML
4 INJECTION INTRAMUSCULAR; INTRAVENOUS ONCE AS NEEDED
Status: DISCONTINUED | OUTPATIENT
Start: 2021-07-08 | End: 2021-07-11 | Stop reason: HOSPADM

## 2021-07-08 RX ORDER — HEPARIN SODIUM 1000 [USP'U]/ML
INJECTION, SOLUTION INTRAVENOUS; SUBCUTANEOUS AS NEEDED
Status: DISCONTINUED | OUTPATIENT
Start: 2021-07-08 | End: 2021-07-08

## 2021-07-08 RX ORDER — SODIUM CHLORIDE 9 MG/ML
100 INJECTION, SOLUTION INTRAVENOUS CONTINUOUS
Status: DISCONTINUED | OUTPATIENT
Start: 2021-07-08 | End: 2021-07-09

## 2021-07-08 RX ORDER — CEFAZOLIN SODIUM 1 G/50ML
SOLUTION INTRAVENOUS AS NEEDED
Status: DISCONTINUED | OUTPATIENT
Start: 2021-07-08 | End: 2021-07-08

## 2021-07-08 RX ORDER — METOPROLOL TARTRATE 5 MG/5ML
2.5 INJECTION INTRAVENOUS ONCE
Status: COMPLETED | OUTPATIENT
Start: 2021-07-08 | End: 2021-07-08

## 2021-07-08 RX ORDER — PROPOFOL 10 MG/ML
INJECTION, EMULSION INTRAVENOUS CONTINUOUS PRN
Status: DISCONTINUED | OUTPATIENT
Start: 2021-07-08 | End: 2021-07-08

## 2021-07-08 RX ORDER — DEXAMETHASONE SODIUM PHOSPHATE 10 MG/ML
INJECTION, SOLUTION INTRAMUSCULAR; INTRAVENOUS AS NEEDED
Status: DISCONTINUED | OUTPATIENT
Start: 2021-07-08 | End: 2021-07-08

## 2021-07-08 RX ORDER — LIDOCAINE HYDROCHLORIDE 10 MG/ML
INJECTION, SOLUTION EPIDURAL; INFILTRATION; INTRACAUDAL; PERINEURAL AS NEEDED
Status: DISCONTINUED | OUTPATIENT
Start: 2021-07-08 | End: 2021-07-08

## 2021-07-08 RX ORDER — ALBUMIN, HUMAN INJ 5% 5 %
SOLUTION INTRAVENOUS CONTINUOUS PRN
Status: DISCONTINUED | OUTPATIENT
Start: 2021-07-08 | End: 2021-07-08

## 2021-07-08 RX ORDER — PROPOFOL 10 MG/ML
INJECTION, EMULSION INTRAVENOUS AS NEEDED
Status: DISCONTINUED | OUTPATIENT
Start: 2021-07-08 | End: 2021-07-08

## 2021-07-08 RX ORDER — FENTANYL CITRATE 50 UG/ML
INJECTION, SOLUTION INTRAMUSCULAR; INTRAVENOUS AS NEEDED
Status: DISCONTINUED | OUTPATIENT
Start: 2021-07-08 | End: 2021-07-08

## 2021-07-08 RX ORDER — FENTANYL CITRATE/PF 50 MCG/ML
25 SYRINGE (ML) INJECTION
Status: DISCONTINUED | OUTPATIENT
Start: 2021-07-08 | End: 2021-07-11 | Stop reason: HOSPADM

## 2021-07-08 RX ORDER — VASOPRESSIN 20 U/ML
INJECTION PARENTERAL AS NEEDED
Status: DISCONTINUED | OUTPATIENT
Start: 2021-07-08 | End: 2021-07-08

## 2021-07-08 RX ORDER — EPHEDRINE SULFATE 50 MG/ML
INJECTION INTRAVENOUS AS NEEDED
Status: DISCONTINUED | OUTPATIENT
Start: 2021-07-08 | End: 2021-07-08

## 2021-07-08 RX ADMIN — MELATONIN TAB 3 MG 3 MG: 3 TAB at 22:14

## 2021-07-08 RX ADMIN — SODIUM CHLORIDE 50 ML/HR: 0.9 INJECTION, SOLUTION INTRAVENOUS at 00:05

## 2021-07-08 RX ADMIN — SODIUM CHLORIDE 100 ML/HR: 0.9 INJECTION, SOLUTION INTRAVENOUS at 19:34

## 2021-07-08 RX ADMIN — LIDOCAINE HYDROCHLORIDE 50 MG: 10 INJECTION, SOLUTION EPIDURAL; INFILTRATION; INTRACAUDAL; PERINEURAL at 13:42

## 2021-07-08 RX ADMIN — PANTOPRAZOLE SODIUM 40 MG: 40 TABLET, DELAYED RELEASE ORAL at 09:11

## 2021-07-08 RX ADMIN — INSULIN LISPRO 2 UNITS: 100 INJECTION, SOLUTION INTRAVENOUS; SUBCUTANEOUS at 22:34

## 2021-07-08 RX ADMIN — DEXAMETHASONE SODIUM PHOSPHATE 10 MG: 10 INJECTION, SOLUTION INTRAMUSCULAR; INTRAVENOUS at 14:00

## 2021-07-08 RX ADMIN — ASPIRIN 81 MG: 81 TABLET, COATED ORAL at 09:11

## 2021-07-08 RX ADMIN — INSULIN GLARGINE 6 UNITS: 100 INJECTION, SOLUTION SUBCUTANEOUS at 09:18

## 2021-07-08 RX ADMIN — METOPROLOL TARTRATE 2.5 MG: 1 INJECTION, SOLUTION INTRAVENOUS at 19:34

## 2021-07-08 RX ADMIN — METOPROLOL TARTRATE 25 MG: 25 TABLET, FILM COATED ORAL at 22:36

## 2021-07-08 RX ADMIN — METOPROLOL TARTRATE 25 MG: 25 TABLET, FILM COATED ORAL at 09:11

## 2021-07-08 RX ADMIN — CEFEPIME HYDROCHLORIDE 2000 MG: 2 INJECTION, POWDER, FOR SOLUTION INTRAVENOUS at 22:30

## 2021-07-08 RX ADMIN — PROPOFOL 30 MCG/KG/MIN: 10 INJECTION, EMULSION INTRAVENOUS at 13:43

## 2021-07-08 RX ADMIN — CLOTRIMAZOLE: 0.01 CREAM TOPICAL at 09:11

## 2021-07-08 RX ADMIN — Medication 250 MG: at 22:25

## 2021-07-08 RX ADMIN — HEPARIN SODIUM 1000 UNITS: 1000 INJECTION INTRAVENOUS; SUBCUTANEOUS at 17:04

## 2021-07-08 RX ADMIN — DOCUSATE SODIUM AND SENNOSIDES 2 TABLET: 8.6; 5 TABLET ORAL at 22:14

## 2021-07-08 RX ADMIN — CEFAZOLIN SODIUM 1000 MG: 1 SOLUTION INTRAVENOUS at 18:10

## 2021-07-08 RX ADMIN — PROPOFOL 40 MG: 10 INJECTION, EMULSION INTRAVENOUS at 13:45

## 2021-07-08 RX ADMIN — IODIXANOL 280 ML: 320 INJECTION, SOLUTION INTRAVASCULAR at 21:01

## 2021-07-08 RX ADMIN — Medication 250 MG: at 09:11

## 2021-07-08 RX ADMIN — VANCOMYCIN HYDROCHLORIDE 750 MG: 750 INJECTION, SOLUTION INTRAVENOUS at 10:49

## 2021-07-08 RX ADMIN — VASOPRESSIN 2 UNITS: 20 INJECTION INTRAVENOUS at 16:17

## 2021-07-08 RX ADMIN — ENOXAPARIN SODIUM 40 MG: 40 INJECTION SUBCUTANEOUS at 09:10

## 2021-07-08 RX ADMIN — HEPARIN SODIUM 2000 UNITS: 1000 INJECTION INTRAVENOUS; SUBCUTANEOUS at 15:25

## 2021-07-08 RX ADMIN — FERROUS SULFATE TAB 325 MG (65 MG ELEMENTAL FE) 325 MG: 325 (65 FE) TAB at 09:11

## 2021-07-08 RX ADMIN — EPHEDRINE SULFATE 10 MG: 50 INJECTION, SOLUTION INTRAVENOUS at 14:11

## 2021-07-08 RX ADMIN — ATORVASTATIN CALCIUM 40 MG: 40 TABLET, FILM COATED ORAL at 22:13

## 2021-07-08 RX ADMIN — CLOPIDOGREL BISULFATE 75 MG: 75 TABLET ORAL at 09:11

## 2021-07-08 RX ADMIN — NOREPINEPHRINE BITARTRATE 10 MCG/MIN: 1 INJECTION, SOLUTION, CONCENTRATE INTRAVENOUS at 16:50

## 2021-07-08 RX ADMIN — ALBUMIN (HUMAN): 12.5 INJECTION, SOLUTION INTRAVENOUS at 14:41

## 2021-07-08 RX ADMIN — Medication 1000 UNITS: at 09:11

## 2021-07-08 RX ADMIN — HEPARIN SODIUM 4000 UNITS: 1000 INJECTION INTRAVENOUS; SUBCUTANEOUS at 14:52

## 2021-07-08 RX ADMIN — EPHEDRINE SULFATE 10 MG: 50 INJECTION, SOLUTION INTRAVENOUS at 13:55

## 2021-07-08 RX ADMIN — CEFEPIME HYDROCHLORIDE 2000 MG: 2 INJECTION, POWDER, FOR SOLUTION INTRAVENOUS at 09:18

## 2021-07-08 RX ADMIN — PROPOFOL 40 MG: 10 INJECTION, EMULSION INTRAVENOUS at 14:19

## 2021-07-08 RX ADMIN — INSULIN HUMAN 1 UNITS: 100 INJECTION, SOLUTION PARENTERAL at 17:13

## 2021-07-08 RX ADMIN — PANTOPRAZOLE SODIUM 40 MG: 40 TABLET, DELAYED RELEASE ORAL at 22:14

## 2021-07-08 RX ADMIN — PROPOFOL 30 MG: 10 INJECTION, EMULSION INTRAVENOUS at 13:43

## 2021-07-08 RX ADMIN — HEPARIN SODIUM 3000 UNITS: 1000 INJECTION INTRAVENOUS; SUBCUTANEOUS at 16:12

## 2021-07-08 RX ADMIN — FLUTICASONE PROPIONATE 1 SPRAY: 50 SPRAY, METERED NASAL at 09:11

## 2021-07-08 RX ADMIN — PHENYLEPHRINE HYDROCHLORIDE 40 MCG/MIN: 10 INJECTION INTRAVENOUS at 13:49

## 2021-07-08 RX ADMIN — FENTANYL CITRATE 25 MCG: 50 INJECTION INTRAMUSCULAR; INTRAVENOUS at 15:55

## 2021-07-08 RX ADMIN — SODIUM CHLORIDE: 0.9 INJECTION, SOLUTION INTRAVENOUS at 14:36

## 2021-07-08 RX ADMIN — EPHEDRINE SULFATE 10 MG: 50 INJECTION, SOLUTION INTRAVENOUS at 15:49

## 2021-07-08 RX ADMIN — FENTANYL CITRATE 25 MCG: 50 INJECTION INTRAMUSCULAR; INTRAVENOUS at 16:20

## 2021-07-08 NOTE — ANESTHESIA POSTPROCEDURE EVALUATION
Post-Op Assessment Note    CV Status:  Stable  Pain Score: 0    Pain management: adequate     Mental Status:  Alert and awake   Hydration Status:  Euvolemic   PONV Controlled:  Controlled   Airway Patency:  Patent   There is a medical reason for not screening for obstructive sleep apnea and/or for not using two or more mitigation strategies (dementia)   Post Op Vitals Reviewed: Yes      Staff: CRNA         No complications documented      BP      Temp (P) 98 1 °F (36 7 °C) (07/08/21 1837)    Pulse (!) 110 (07/08/21 1837)   Resp      SpO2

## 2021-07-08 NOTE — PROGRESS NOTES
Progress Note - Infectious Disease   Shelton Silva 80 y o  male MRN: [id number]  Unit/Bed#: [facility] 320-01 Encounter: [id number]      Impression/Recommendations:  1  Right 2nd toe osteomyelitis, confirmed but foot x-ray   Patient is status post bone biopsy, with growth of MRSA and Pseudomonas   Antibiotic regimen will be modified  Karena Soto advanced age, underlying PVD and somewhat poorly controlled DM, probability of toe salvage is essentially nil   Fortunately, it appears the patient's POA is finally agreeable to toe amputation   This is the right decision   Patient has high risk for long-term IV antibiotic toxicity and very low probability of cure  Continue vancomycin/cefepime for now  Plan for toe amputation noted  Likely discontinue antibiotics after toe amputation      2  Right 2nd toe ulcer, likely multifactorial, with DM and PVD contributing   I doubt the ulcer will heal with local care  Local ulcer care per Podiatry  Plan for toe amputation when vascular status is optimized noted      3  DM, poorly controlled, with hyperglycemia and elevated hemoglobin A1c   This plays a big role in poor wound healing and infection   Patient needs better long-term control of blood sugar  Management per primary service      4  PVD   Plan for arteriogram and optimization of PVD noted  Follow-up on arteriogram findings later today      Discussed with patient in detail regarding the above plan      Antibiotics:  Vancomycin/cefepime # 4     Subjective:  Patient is comfortable  No pain in toe or foot  Mental status stable, with mild confusion  Temperature stays down   No chills    He is tolerating antibiotic well   No nausea, vomiting or diarrhea      Objective:  Vitals:  Temp:  [97 4 °F (36 3 °C)-98 3 °F (36 8 °C)] 97 4 °F (36 3 °C)  HR:  [75-89] 75  Resp:  [18-21] 18  BP: (115-126)/(58-66) 125/58  SpO2:  [96 %-100 %] 99 %  Temp (24hrs), Av 7 °F (36 5 °C), Min:97 4 °F (36 3 °C), Max:98 3 °F (36 8 °C)  Current: Temperature: Charlene Castellani ) 97 4 °F (36 3 °C)    Physical Exam:     General: Awake, alert, cooperative, no distress  Neck:  Supple  No mass  No lymphadenopathy  Lungs: Expansion symmetric, no rales, no wheezing, respirations unlabored  Heart:  Regular rate and rhythm, S1 and S2 normal, no murmur  Abdomen: Soft, nondistended, non-tender, bowel sounds active all four quadrants, no masses, no organomegaly  Extremities: Trace leg edema  Stable right 2nd toe ulcer, without purulence  Stable erythema without warmth  Minimal tenderness  Skin:  No rash  Neuro: Moves all extremities  Invasive Devices     Peripheral Intravenous Line            Peripheral IV 07/08/21 Distal;Right;Upper;Ventral (anterior) Arm <1 day          Drain            Suprapubic Catheter -- days                Labs studies:   I have personally reviewed pertinent labs  Results from last 7 days   Lab Units 07/08/21  0502 07/07/21  0428 07/06/21  0505 07/04/21  0654   POTASSIUM mmol/L 3 7 4 0 4 3 4 2   CHLORIDE mmol/L 102 103 104 104   CO2 mmol/L 28 29 24 27   BUN mg/dL 12 10 9 10   CREATININE mg/dL 0 90 0 82 0 76 0 85   EGFR ml/min/1 73sq m 74 77 79 76   CALCIUM mg/dL 8 9 9 0 8 9 8 9   AST U/L  --   --   --  26   ALT U/L  --   --   --  17   ALK PHOS U/L  --   --   --  127*     Results from last 7 days   Lab Units 07/08/21  0502 07/07/21  0428 07/06/21  0505   WBC Thousand/uL 9 99 9 24 8 58   HEMOGLOBIN g/dL 11 7* 11 4* 11 2*   PLATELETS Thousands/uL 373 382 348     Results from last 7 days   Lab Units 07/02/21  1134   GRAM STAIN RESULT  No Polys*  1+ Gram negative rods*  Rare Gram positive cocci in pairs*       Imaging Studies:   I have personally reviewed pertinent imaging study reports and images in PACS  EKG, Pathology, and Other Studies:   I have personally reviewed pertinent reports

## 2021-07-08 NOTE — PROGRESS NOTES
Progress Note - Vascular Surgery   Gil Villalobos 80 y o  male MRN: [id number]  Unit/Bed#: [facility] 320-01 Encounter: [id number]    Assessment:  77-year-old male with right 2nd digit dry gangrene, history of left BKA, dementia, type 2 diabetes, and known PAD     RLE-palpable femoral pulse, dopplerable DP/PT/peroneal  LLE-left BKA, palpable femoral    Plan:  Plan for RLE angiogram today 7/8 with IR  Continue ASA/plavix/statin  Wound care per podiatry  Rest of care per primary    Subjective/Objective     Subjective: No acute events overnight, Subjective limited by dementia, no complaints this morning    Objective:    Blood pressure 115/58, pulse 89, temperature 98 3 °F (36 8 °C), temperature source Oral, resp  rate 18, height 5' 5" (1 651 m), weight 57 2 kg (126 lb 1 7 oz), SpO2 96 %  ,Body mass index is 20 98 kg/m²        Intake/Output Summary (Last 24 hours) at 7/8/2021 0615  Last data filed at 7/8/2021 0524  Gross per 24 hour   Intake 1435 83 ml   Output 1600 ml   Net -164 17 ml       Invasive Devices     Peripheral Intravenous Line            Peripheral IV 07/08/21 Distal;Right;Upper;Ventral (anterior) Arm <1 day          Drain            Suprapubic Catheter -- days                Physical Exam:  Gen:    NAD  CV:      warm, well-perfused  Lungs: No respiratory distress  Abd:     soft, NT/ND  Ext:      Loss of dorsiflexion/plantarflexion of R toes and R ankle, R dorsal 2nd digit ulceration probing to bone  Neuro: Disoriented

## 2021-07-08 NOTE — ANESTHESIA PREPROCEDURE EVALUATION
Procedure:  IR LOWER EXTREMITY ANGIOGRAM    Relevant Problems   CARDIO   (+) Hypertension      ENDO   (+) Type 2 diabetes mellitus, with long-term current use of insulin (HCC)      /RENAL   (+) BPH (benign prostatic hyperplasia)      HEMATOLOGY   (+) Anemia      NEURO/PSYCH   (+) Dementia without behavioral disturbance (HCC)   (+) History of coronary artery disease     Sinus rhythm with 1st degree A-V block  Right bundle branch block  Left anterior fascicular block   Bifascicular block     Results for Izabella Orozco (MRN [id number]) as of 7/8/2021 13:15   Ref  Range 7/8/2021 05:02   WBC Latest Ref Range: 4 31 - 10 16 Thousand/uL 9 99   Red Blood Cell Count Latest Ref Range: 3 88 - 5 62 Million/uL 3 79 (L)   Hemoglobin Latest Ref Range: 12 0 - 17 0 g/dL 11 7 (L)   HCT Latest Ref Range: 36 5 - 49 3 % 36 1 (L)   MCV Latest Ref Range: 82 - 98 fL 95   MCH Latest Ref Range: 26 8 - 34 3 pg 30 9   MCHC Latest Ref Range: 31 4 - 37 4 g/dL 32 4   RDW Latest Ref Range: 11 6 - 15 1 % 13 1   Platelet Count Latest Ref Range: 149 - 390 Thousands/uL 373   MPV Latest Ref Range: 8 9 - 12 7 fL 9 8   Protime Latest Ref Range: 11 6 - 14 5 seconds 15 6 (H)   INR Latest Ref Range: 0 84 - 1 19  1 24 (H)             Anesthesia Plan  ASA Score- 3     Anesthesia Type- IV sedation with anesthesia with ASA Monitors  Additional Monitors:   Airway Plan:     Comment: Plan for IV sedation with GETA as back up  Anesthesia consent obtained from manuel Benavides ( filed in chart)          Plan Factors-Exercise tolerance (METS): <4 METS  Chart reviewed  EKG reviewed  Imaging results reviewed  Existing labs reviewed  Patient summary reviewed  Induction- intravenous  Postoperative Plan- Plan for postoperative opioid use  Planned trial extubation    Informed Consent- Anesthetic plan and risks discussed with patient  I personally reviewed this patient with the CRNA   Discussed and agreed on the Anesthesia Plan with the NELI Perez

## 2021-07-08 NOTE — PROGRESS NOTES
Vancomycin Pharmacy Consult     Maynor Olivier is an 80 y o  male who is currently receiving IV vancomycin for right 2nd toe MRSA OM (amputation planned for 7/8)  Vancomycin Assessment:  1  ID Consult: Yes  2  Cultures:   7/2 Culture Tissue: 4+ Pseudomonas aeruginosa, 2+ MRSA  3  Procalcitonin:   n/a  4  Renal Function:   SCr: 0 82  CrCl: 37 mL/min  UOP: 1 2 mL/kg/hr  5  Days of Therapy: 4  6  Current Dose: 750 mg IV q12h  7  Last Level: 19 1 (trough 7/6 at 2216)  8  Goal AUC(24h): 400-600  9  Goal Random/Trough: 15-20     Vancomycin Plan:  1  Evaluation: continue current regimen  2  New Dosing: continue 750 mg IV q12h  Predicted Trough / AUC(24h): 18 5 / 582  3  Next Level: trough 7/9 at 1000        Pharmacy will continue to follow closely for s/sx of nephrotoxicity, infusion reactions, and appropriateness of therapy  BMP and CBC will be ordered per protocol  We will continue to follow the patients culture results and clinical progress daily  Saige Mail  Taurus Carlton PharmD  Internal Medicine Clinical Pharmacist Specialist  [phone]  [facility]

## 2021-07-08 NOTE — PROGRESS NOTES
INTERNAL MEDICINE RESIDENCY PROGRESS NOTE     Name: Lizzy Stein   Age & Sex: 80 y o  male   MRN: [id number]  Unit/Bed#: [facility] 320-01   Encounter: [id number]  Team: SOD Team B     PATIENT INFORMATION     Name: Lizzy Stein   Age & Sex: 80 y o  male   MRN: [id number]  Hospital Stay Days: 12    ASSESSMENT/PLAN     Principal Problem:    Right second toe ulcer ([facility] )  Active Problems:    Dementia without behavioral disturbance ([place] [id number] )    History of coronary artery disease    Peripheral vascular disease in diabetes mellitus ([place] [address] )    BPH (benign prostatic hyperplasia)    Type 2 diabetes mellitus, with long-term current use of insulin ([facility])    Diastolic heart failure ([place] [address] )    Hypertension    Anemia    Hearing difficulty of both ears    Actinic dermatitis    Constipation      * Right second toe ulcer ([facility] )  Assessment & Plan  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 confirmed by foot x-ray  Leukocytosis on admission 11 5, now 8 46  CRP of 18 4, ESR 59 on admission  Patient is afebrile, no tachycardia no tachypnea, blood pressure WNL  · Podiatry following, appreciate recommendations  Patient and POA Kirti Bound) now agreeable to plan for amputation of right 2nd toe, arteriogram scheduled for 7/8, amputation to be scheduled after results of arteriogram  SOLIS Mclean would like a call before the amputation  · ID consulted, recommending holding antibiotic therapy for 48-72 hours and biopsy of the right 2nd toe ulcer to help guide and optimized therapy  · Culture of toe ulcer taken 7/2 -> 4+ pseudomonas, 2+ MRSA  · Patient started on vancomycin/cefepime as per ID recommendations  · Previous plan for IV abx treatment changed, PICC placement cancelled, now proceeding with arteriogram with possible amputation, will continue IV antibiotics until after amputation  · Daily CBC  · Monitor vitals  · Wound care  · P r n   Tylenol      Constipation  Assessment & Plan  Unknown last bowel movement on admission, most recent bowel movement 7/8  · Miralax 17 g daily   · Senokot daily  · Dulcolax 10 mg suppository PRN      Actinic dermatitis  Assessment & Plan  Attending admitted noted on admission including scald and different areas mainly bilateral groin and right lower extremity  · Clotrimazole cream  · Wound care consult    Hearing difficulty of both ears  Assessment & Plan  Hearing difficulty noted on admission most likely presbycusis  Anemia  Assessment & Plan  History of anemia, hemoglobin baseline 11-13, hemoglobin stable  · Continue home ferrous sulfate 325 ; will give every other day instead of daily while inpatient  · Bowel regimen daily  · May need MMA, folate, iron panel as outpatient  · Continue to monitor daily CBC      Hypertension  Assessment & Plan  History of hypertension, blood pressure well controlled on admission 130s over 60s  · Continue home Lopressor 25 b i d   · Continue Bumex 0 5 mg daily - diastolic heart failure  · Monitor vitals    Diastolic heart failure (HCC)  Assessment & Plan  Wt Readings from Last 3 Encounters:   07/06/21 56 4 kg (124 lb 5 4 oz)   03/10/20 77 1 kg (170 lb)   09/10/19 77 1 kg (170 lb)     History of chronic diastolic heart failure, right lower extremity grade 1 edema on admission, denies shortness of breath, CTAB on admission  Plan  · Continue home Lopressor 25 b i d   · Continue home Bumex 0 5 mg q d    · Daily in/out  · Cardiac diet, Na <2G and <2L fluid     Type 2 diabetes mellitus, with long-term current use of insulin [facility])  Assessment & Plan  Lab Results   Component Value Date    HGBA1C 6 9 (H) 06/26/2021       Recent Labs     07/07/21  1658 07/07/21  2113 07/08/21  0751 07/08/21  1121   POCGLU 137 169* 127 122       Blood Sugar Average: Last 72 hrs:  (P) 146 6   Relatively controlled H A1c 6 9  Home medications include Lantus 12 units at noon, and metformin 500  · Hold metformin while inpatient  · Decreased Lantus to 6 units in setting of decreased oral intake and sugars in 's  · Monitor glucose and adjust as needed    BPH (benign prostatic hyperplasia)  Assessment & Plan  Known history of large prostate, came with suprapubic Hays in place  · Continue home Flomax 0 4 mg daily  · Consider holding Myrbetriq given the suprapubic Hays in place    Peripheral vascular disease in diabetes mellitus [facility])  Assessment & Plan  S/p L BKA  6/27 Lower limb arterial duplex - Diffuse disease noted throughout the femoropopliteal arteries without  significant focal stenosis  Evidence suggestive of Tibioperoneal occlusive disease  Ankle/Brachial index: Non-compressible  Metatarsal pressure and Great toe pressure unobtainable secondary to attenuated PPG waveform  PVR/ PPG tracings are dampened  · Continue ASA 81 mg daily  · Continue Plavix 75 mg daily  · See 2nd right toe ulcer assessment and plan    History of coronary artery disease  Assessment & Plan  History of coronary artery disease, denies any chest pain or palpitation on admission    Plan  · Continue aspirin 81 mg daily  · Continue Plavix 7 mg daily  · Continue atorvastatin 10 mg daily    Dementia without behavioral disturbance (HCC)  Assessment & Plan  History of dementia without behavioral disturbance, old history of stroke, AO x1 to self, baseline and stable  His niece, Ajith Marlow, is POA  Contact at [phone]     Dark urine-resolved as of 7/8/2021  Assessment & Plan  Noted in suprapubic cathter bag  UA unreliable due to interfering substance  Received IV hydration  Appears to have resolved  Continue to monitor vitals      Disposition: Continue current level of care  Scheduled for arteriogram of right foot this afternoon with possible amputation to be scheduled after results of arteriogram      SUBJECTIVE     Patient seen and examined  No acute events overnight  This morning, Linda Egan had no complaints  He still reports no pain in his foot and answers most questions with yes/no responses   He was made aware again for the plan to undergo arteriogram later today  He denied HA, chest pain, SOB, fever/chills, N/V/D/C, foot pain  OBJECTIVE     Vitals:    21 1500 21 2200 21 0026 21 0812   BP: 126/66 125/59 115/58 125/58   BP Location: Right arm  Right arm Right arm   Pulse: 88 89 89 75   Resp:    Temp: 97 5 °F (36 4 °C)  98 3 °F (36 8 °C) (!) 97 4 °F (36 3 °C)   TempSrc: Oral  Oral Oral   SpO2: 100%  96% 99%   Weight:       Height:          Temperature:   Temp (24hrs), Av 7 °F (36 5 °C), Min:97 4 °F (36 3 °C), Max:98 3 °F (36 8 °C)    Temperature: (!) 97 4 °F (36 3 °C)  Intake & Output:  I/O        07 -  07 07 -  07 07 -  0700    P  O  835 940     I V  (mL/kg) 60 (1) 295 8 (5 2)     IV Piggyback 200 200     Total Intake(mL/kg) 1095 (19 1) 1435 8 (25 1)     Urine (mL/kg/hr) 2800 (2) 1600 (1 2)     Stool 0 0     Total Output 2800 1600     Net -1705 -164 2            Unmeasured Stool Occurrence 1 x 1 x         Weights:   IBW (Ideal Body Weight): 61 5 kg    Body mass index is 20 98 kg/m²  Weight (last 2 days)     Date/Time   Weight    21 0600   57 2 (126 1)    21 0513   56 4 (124 34)            Physical Exam  Vitals and nursing note reviewed  Constitutional:       General: He is not in acute distress  Cardiovascular:      Rate and Rhythm: Normal rate and regular rhythm  Heart sounds: Normal heart sounds  Pulmonary:      Effort: Pulmonary effort is normal       Breath sounds: Normal breath sounds  Abdominal:      General: Abdomen is flat  Bowel sounds are normal       Palpations: Abdomen is soft  Tenderness: There is no abdominal tenderness  Feet:      Comments: Black ulcer on dorsal aspect of second toe on right foot  Unable to palpate DP or PT pulses  Skin:     Capillary Refill: Capillary refill takes less than 2 seconds  Neurological:      Mental Status: Mental status is at baseline        Comments: Oriented to self, place, and year LABORATORY DATA     Labs: I have personally reviewed pertinent reports  Results from last 7 days   Lab Units 07/08/21  0502 07/07/21  0428 07/06/21  0505 07/03/21  0540   WBC Thousand/uL 9 99 9 24 8 58 9 09   HEMOGLOBIN g/dL 11 7* 11 4* 11 2* 10 6*   HEMATOCRIT % 36 1* 34 8* 34 7* 33 0*   PLATELETS Thousands/uL 373 382 348 320   NEUTROS PCT %  --  70 68 70   MONOS PCT %  --  6 7 6      Results from last 7 days   Lab Units 07/08/21  0502 07/07/21  0428 07/06/21  0505 07/04/21  0654   POTASSIUM mmol/L 3 7 4 0 4 3 4 2   CHLORIDE mmol/L 102 103 104 104   CO2 mmol/L 28 29 24 27   BUN mg/dL 12 10 9 10   CREATININE mg/dL 0 90 0 82 0 76 0 85   CALCIUM mg/dL 8 9 9 0 8 9 8 9   ALK PHOS U/L  --   --   --  127*   ALT U/L  --   --   --  17   AST U/L  --   --   --  26     Results from last 7 days   Lab Units 07/04/21  0654   MAGNESIUM mg/dL 2 2          Results from last 7 days   Lab Units 07/08/21  0502   INR  1 24*               IMAGING & DIAGNOSTIC TESTING     Radiology Results: I have personally reviewed pertinent reports  XR foot 3+ views RIGHT    Result Date: 6/27/2021  Impression: 2nd mid phalanx, 2nd distal phalanx osteomyelitis suspected  The study was marked in [facility] for immediate notification  Workstation performed: [id number]     Other Diagnostic Testing: I have personally reviewed pertinent reports      ACTIVE MEDICATIONS     Current Facility-Administered Medications   Medication Dose Route Frequency    acetaminophen (TYLENOL) tablet 488 mg  488 mg Oral Q6H PRN    aspirin (ECOTRIN LOW STRENGTH) EC tablet 81 mg  81 mg Oral Daily    atorvastatin (LIPITOR) tablet 40 mg  40 mg Oral Daily With Dinner    bisacodyl (DULCOLAX) rectal suppository 10 mg  10 mg Rectal Daily PRN    bumetanide (BUMEX) tablet 0 5 mg  0 5 mg Oral Daily    cefepime (MAXIPIME) 2,000 mg in dextrose 5 % 50 mL IVPB  2,000 mg Intravenous Q12H    cholecalciferol (VITAMIN D3) tablet 1,000 Units  1,000 Units Oral Daily    clopidogrel (PLAVIX) tablet 75 mg  75 mg Oral Daily    clotrimazole (LOTRIMIN) 1 % cream   Topical BID    enoxaparin (LOVENOX) subcutaneous injection 40 mg  40 mg Subcutaneous Daily    ferrous sulfate tablet 325 mg  325 mg Oral Daily With Breakfast    fluticasone (FLONASE) 50 mcg/act nasal spray 1 spray  1 spray Each Nare Daily    insulin glargine (LANTUS) subcutaneous injection 6 Units 0 06 mL  6 Units Subcutaneous Daily    insulin lispro (HumaLOG) 100 units/mL subcutaneous injection 1-5 Units  1-5 Units Subcutaneous 4x Daily (AC & HS)    melatonin tablet 3 mg  3 mg Oral HS    metoprolol tartrate (LOPRESSOR) tablet 25 mg  25 mg Oral Q12H AGGIE    pantoprazole (PROTONIX) EC tablet 40 mg  40 mg Oral BID    polyethylene glycol (MIRALAX) packet 17 g  17 g Oral Daily    saccharomyces boulardii (FLORASTOR) capsule 250 mg  250 mg Oral BID    senna-docusate sodium (SENOKOT S) 8 6-50 mg per tablet 2 tablet  2 tablet Oral HS    sodium chloride 0 9 % infusion  50 mL/hr Intravenous Continuous    tamsulosin (FLOMAX) capsule 0 4 mg  0 4 mg Oral Daily With Dinner    vancomycin (VANCOCIN) IVPB (premix in dextrose) 750 mg 150 mL  750 mg Intravenous Q12H       VTE Pharmacologic Prophylaxis: Enoxaparin (Lovenox)  VTE Mechanical Prophylaxis: sequential compression device    Portions of the record may have been created with voice recognition software  Occasional wrong word or "sound a like" substitutions may have occurred due to the inherent limitations of voice recognition software    Read the chart carefully and recognize, using context, where substitutions have occurred   ==  [address] [facility]  MS4

## 2021-07-09 ENCOUNTER — APPOINTMENT (INPATIENT)
Dept: NON INVASIVE DIAGNOSTICS | Facility: HOSPITAL | Age: 86
DRG: 617 | End: 2021-07-09
Payer: MEDICARE

## 2021-07-09 LAB
ANION GAP SERPL CALCULATED.3IONS-SCNC: 7 MMOL/L (ref 4–13)
ATRIAL RATE: 107 BPM
BASOPHILS # BLD AUTO: 0.01 THOUSANDS/ΜL (ref 0–0.1)
BASOPHILS NFR BLD AUTO: 0 % (ref 0–1)
BUN SERPL-MCNC: 12 MG/DL (ref 5–25)
CALCIUM SERPL-MCNC: 8.4 MG/DL (ref 8.3–10.1)
CHLORIDE SERPL-SCNC: 108 MMOL/L (ref 100–108)
CO2 SERPL-SCNC: 23 MMOL/L (ref 21–32)
CREAT SERPL-MCNC: 0.84 MG/DL (ref 0.6–1.3)
EOSINOPHIL # BLD AUTO: 0 THOUSAND/ΜL (ref 0–0.61)
EOSINOPHIL NFR BLD AUTO: 0 % (ref 0–6)
ERYTHROCYTE [DISTWIDTH] IN BLOOD BY AUTOMATED COUNT: 13.2 % (ref 11.6–15.1)
GFR SERPL CREATININE-BSD FRML MDRD: 76 ML/MIN/1.73SQ M
GLUCOSE SERPL-MCNC: 177 MG/DL (ref 65–140)
GLUCOSE SERPL-MCNC: 190 MG/DL (ref 65–140)
GLUCOSE SERPL-MCNC: 199 MG/DL (ref 65–140)
GLUCOSE SERPL-MCNC: 227 MG/DL (ref 65–140)
GLUCOSE SERPL-MCNC: 231 MG/DL (ref 65–140)
HCT VFR BLD AUTO: 33 % (ref 36.5–49.3)
HGB BLD-MCNC: 10.7 G/DL (ref 12–17)
IMM GRANULOCYTES # BLD AUTO: 0.06 THOUSAND/UL (ref 0–0.2)
IMM GRANULOCYTES NFR BLD AUTO: 1 % (ref 0–2)
LYMPHOCYTES # BLD AUTO: 0.83 THOUSANDS/ΜL (ref 0.6–4.47)
LYMPHOCYTES NFR BLD AUTO: 9 % (ref 14–44)
MCH RBC QN AUTO: 31.4 PG (ref 26.8–34.3)
MCHC RBC AUTO-ENTMCNC: 32.4 G/DL (ref 31.4–37.4)
MCV RBC AUTO: 97 FL (ref 82–98)
MONOCYTES # BLD AUTO: 0.31 THOUSAND/ΜL (ref 0.17–1.22)
MONOCYTES NFR BLD AUTO: 3 % (ref 4–12)
NEUTROPHILS # BLD AUTO: 8.35 THOUSANDS/ΜL (ref 1.85–7.62)
NEUTS SEG NFR BLD AUTO: 87 % (ref 43–75)
NRBC BLD AUTO-RTO: 0 /100 WBCS
PLATELET # BLD AUTO: 317 THOUSANDS/UL (ref 149–390)
PMV BLD AUTO: 9.8 FL (ref 8.9–12.7)
POTASSIUM SERPL-SCNC: 4.1 MMOL/L (ref 3.5–5.3)
PR INTERVAL: 591 MS
QRS AXIS: -84 DEGREES
QRSD INTERVAL: 133 MS
QT INTERVAL: 400 MS
QTC INTERVAL: 534 MS
RBC # BLD AUTO: 3.41 MILLION/UL (ref 3.88–5.62)
SODIUM SERPL-SCNC: 138 MMOL/L (ref 136–145)
T WAVE AXIS: 53 DEGREES
VANCOMYCIN TROUGH SERPL-MCNC: 24.6 UG/ML (ref 10–20)
VENTRICULAR RATE: 107 BPM
WBC # BLD AUTO: 9.56 THOUSAND/UL (ref 4.31–10.16)

## 2021-07-09 PROCEDURE — 99232 SBSQ HOSP IP/OBS MODERATE 35: CPT | Performed by: PODIATRIST

## 2021-07-09 PROCEDURE — 93923 UPR/LXTR ART STDY 3+ LVLS: CPT

## 2021-07-09 PROCEDURE — 93010 ELECTROCARDIOGRAM REPORT: CPT | Performed by: INTERNAL MEDICINE

## 2021-07-09 PROCEDURE — NC001 PR NO CHARGE: Performed by: SURGERY

## 2021-07-09 PROCEDURE — 80048 BASIC METABOLIC PNL TOTAL CA: CPT | Performed by: INTERNAL MEDICINE

## 2021-07-09 PROCEDURE — 85025 COMPLETE CBC W/AUTO DIFF WBC: CPT | Performed by: INTERNAL MEDICINE

## 2021-07-09 PROCEDURE — 93923 UPR/LXTR ART STDY 3+ LVLS: CPT | Performed by: SURGERY

## 2021-07-09 PROCEDURE — 99232 SBSQ HOSP IP/OBS MODERATE 35: CPT | Performed by: INTERNAL MEDICINE

## 2021-07-09 PROCEDURE — 82948 REAGENT STRIP/BLOOD GLUCOSE: CPT

## 2021-07-09 PROCEDURE — 80202 ASSAY OF VANCOMYCIN: CPT | Performed by: INTERNAL MEDICINE

## 2021-07-09 RX ADMIN — INSULIN LISPRO 1 UNITS: 100 INJECTION, SOLUTION INTRAVENOUS; SUBCUTANEOUS at 07:50

## 2021-07-09 RX ADMIN — CLOTRIMAZOLE: 0.01 CREAM TOPICAL at 17:16

## 2021-07-09 RX ADMIN — CLOPIDOGREL BISULFATE 75 MG: 75 TABLET ORAL at 08:30

## 2021-07-09 RX ADMIN — METOPROLOL TARTRATE 25 MG: 25 TABLET, FILM COATED ORAL at 22:40

## 2021-07-09 RX ADMIN — VANCOMYCIN HYDROCHLORIDE 750 MG: 750 INJECTION, SOLUTION INTRAVENOUS at 13:00

## 2021-07-09 RX ADMIN — FERROUS SULFATE TAB 325 MG (65 MG ELEMENTAL FE) 325 MG: 325 (65 FE) TAB at 08:30

## 2021-07-09 RX ADMIN — INSULIN LISPRO 2 UNITS: 100 INJECTION, SOLUTION INTRAVENOUS; SUBCUTANEOUS at 22:41

## 2021-07-09 RX ADMIN — METOPROLOL TARTRATE 25 MG: 25 TABLET, FILM COATED ORAL at 08:30

## 2021-07-09 RX ADMIN — DOCUSATE SODIUM AND SENNOSIDES 2 TABLET: 8.6; 5 TABLET ORAL at 22:40

## 2021-07-09 RX ADMIN — VANCOMYCIN HYDROCHLORIDE 750 MG: 750 INJECTION, SOLUTION INTRAVENOUS at 00:06

## 2021-07-09 RX ADMIN — Medication 250 MG: at 08:31

## 2021-07-09 RX ADMIN — INSULIN LISPRO 2 UNITS: 100 INJECTION, SOLUTION INTRAVENOUS; SUBCUTANEOUS at 17:13

## 2021-07-09 RX ADMIN — CEFEPIME HYDROCHLORIDE 2000 MG: 2 INJECTION, POWDER, FOR SOLUTION INTRAVENOUS at 08:30

## 2021-07-09 RX ADMIN — MELATONIN TAB 3 MG 3 MG: 3 TAB at 22:40

## 2021-07-09 RX ADMIN — CEFEPIME HYDROCHLORIDE 2000 MG: 2 INJECTION, POWDER, FOR SOLUTION INTRAVENOUS at 22:40

## 2021-07-09 RX ADMIN — PANTOPRAZOLE SODIUM 40 MG: 40 TABLET, DELAYED RELEASE ORAL at 17:13

## 2021-07-09 RX ADMIN — POLYETHYLENE GLYCOL 3350 17 G: 17 POWDER, FOR SOLUTION ORAL at 08:30

## 2021-07-09 RX ADMIN — Medication 1000 UNITS: at 08:30

## 2021-07-09 RX ADMIN — ASPIRIN 81 MG: 81 TABLET, COATED ORAL at 08:30

## 2021-07-09 RX ADMIN — TAMSULOSIN HYDROCHLORIDE 0.4 MG: 0.4 CAPSULE ORAL at 17:13

## 2021-07-09 RX ADMIN — BUMETANIDE 0.5 MG: 0.5 TABLET ORAL at 08:31

## 2021-07-09 RX ADMIN — PANTOPRAZOLE SODIUM 40 MG: 40 TABLET, DELAYED RELEASE ORAL at 08:30

## 2021-07-09 RX ADMIN — ENOXAPARIN SODIUM 40 MG: 40 INJECTION SUBCUTANEOUS at 08:30

## 2021-07-09 RX ADMIN — Medication 250 MG: at 17:14

## 2021-07-09 RX ADMIN — INSULIN GLARGINE 6 UNITS: 100 INJECTION, SOLUTION SUBCUTANEOUS at 08:49

## 2021-07-09 RX ADMIN — INSULIN LISPRO 1 UNITS: 100 INJECTION, SOLUTION INTRAVENOUS; SUBCUTANEOUS at 12:17

## 2021-07-09 RX ADMIN — FLUTICASONE PROPIONATE 1 SPRAY: 50 SPRAY, METERED NASAL at 08:31

## 2021-07-09 RX ADMIN — ATORVASTATIN CALCIUM 40 MG: 40 TABLET, FILM COATED ORAL at 17:13

## 2021-07-09 NOTE — PROGRESS NOTES
Progress Note - Vascular Surgery   Mandy Suazo 80 y o  male MRN: [id number]  Unit/Bed#: [facility] 320-01 Encounter: [id number]    Assessment:  80-year-old male with right 2nd digit dry gangrene, history of left BKA, dementia, type 2 diabetes, and known PAD  Now s/p RLE angiogram on 7/8 with successful angioplasty of pop and AT and 3 vessel runoff  RLE-palpable femoral pulse, dopplerable pop/PT/DP  LLE-left BKA, palpable femoral    Plan:  Plan for right 2nd toe amputation with podiatry, timing TBD  Continue ASA/plavix/statin  Wound care per podiatry  Rest of care per primary    Subjective/Objective     Subjective: No acute events overnight, subjective limited by dementia, no complaints    Objective:     Blood pressure 109/62, pulse 97, temperature 98 °F (36 7 °C), temperature source Temporal, resp  rate 15, height 5' 5" (1 651 m), weight 57 2 kg (126 lb 1 7 oz), SpO2 96 %  ,Body mass index is 20 98 kg/m²        Intake/Output Summary (Last 24 hours) at 7/8/2021 2217  Last data filed at 7/8/2021 2039  Gross per 24 hour   Intake 1955 83 ml   Output 2270 ml   Net -314 17 ml       Invasive Devices       Peripheral Intravenous Line              Peripheral IV 07/08/21 Distal;Right;Upper;Ventral (anterior) Arm <1 day              Drain              Suprapubic Catheter -- days                    Physical Exam:  Gen:    NAD  CV:      warm, well-perfused  Lungs: No respiratory distress  Abd:     soft, NT/ND  Ext:      Loss of dorsiflexion/plantarflexion of R toes and R ankle, R dorsal 2nd digit ulceration probing to bone  Neuro: Disoriented

## 2021-07-09 NOTE — PLAN OF CARE
Problem: MOBILITY - ADULT  Goal: Maintain or return to baseline ADL function  Description: INTERVENTIONS:  -  Assess patient's ability to carry out ADLs; assess patient's baseline for ADL function and identify physical deficits which impact ability to perform ADLs (bathing, care of mouth/teeth, toileting, grooming, dressing, etc )  - Assess/evaluate cause of self-care deficits   - Assess range of motion  - Assess patient's mobility; develop plan if impaired  - Assess patient's need for assistive devices and provide as appropriate  - Encourage maximum independence but intervene and supervise when necessary  - Involve family in performance of ADLs  - Assess for home care needs following discharge   - Consider OT consult to assist with ADL evaluation and planning for discharge  - Provide patient education as appropriate  Outcome: Progressing  Goal: Maintains/Returns to pre admission functional level  Description: INTERVENTIONS:  - Perform BMAT or MOVE assessment daily    - Set and communicate daily mobility goal to care team and patient/family/caregiver     - Collaborate with rehabilitation services on mobility goals if consulted  - - Record patient progress and toleration of activity level   Outcome: Progressing

## 2021-07-09 NOTE — PROGRESS NOTES
Progress Note - Infectious Disease   Sotero Mccurdy 80 y o  male MRN: [id number]  Unit/Bed#: [facility] 320-01 Encounter: [id number]      Impression/Recommendations:  1  Right 2nd toe osteomyelitis, confirmed but foot x-ray   Patient is status post bone biopsy, with growth of MRSA and Pseudomonas   Antibiotic regimen will be modified  Miriam Washington advanced age, underlying PVD and somewhat poorly controlled DM, probability of toe salvage is essentially nil   Patient has high risk for long-term IV antibiotic toxicity and very low probability of cure  Plan for toe amputation noted  Continue vancomycin/cefepime for now  Plan for toe amputation noted  Likely discontinue antibiotics after toe amputation      2  Right 2nd toe ulcer, likely multifactorial, with DM and PVD contributing   I doubt the ulcer will heal with local care  Local ulcer care per Podiatry  Plan for toe amputation when vascular status is optimized noted      3  DM, poorly controlled, with hyperglycemia and elevated hemoglobin A1c   This plays a big role in poor wound healing and infection   Patient needs better long-term control of blood sugar  Management per primary service      4  PVD   Patient is status post arteriogram with successful PTA  Vascular surgery follow-up      Discussed with patient in detail regarding the above plan      Antibiotics:  Vancomycin/cefepime # 5     Subjective:  Patient is comfortable  No pain in toe or foot  Mental status stable, with mild confusion  Temperature stays down   No chills    He is tolerating antibiotic well   No nausea, vomiting or diarrhea      Objective:  Vitals:  Temp:  [97 1 °F (36 2 °C)-98 1 °F (36 7 °C)] 97 3 °F (36 3 °C)  HR:  [] 88  Resp:  [12-37] 14  BP: ()/(44-77) 112/56  SpO2:  [95 %-99 %] 99 %  Temp (24hrs), Av 6 °F (36 4 °C), Min:97 1 °F (36 2 °C), Max:98 1 °F (36 7 °C)  Current: Temperature: (!) 97 3 °F (36 3 °C)    Physical Exam:     General: Awake, alert, cooperative, no distress  Neck:  Supple  No mass  No lymphadenopathy  Lungs: Expansion symmetric, no rales, no wheezing, respirations unlabored  Heart:  Regular rate and rhythm, S1 and S2 normal, no murmur  Abdomen: Soft, nondistended, non-tender, bowel sounds active all four quadrants, no masses, no organomegaly  Extremities: No edema  Stable right 2nd toe ulcer, without purulence  Mild erythema, without warmth  Minimal tenderness  Skin:  No rash  Neuro: Moves all extremities  Invasive Devices     Peripheral Intravenous Line            Peripheral IV 07/08/21 Distal;Right;Upper;Ventral (anterior) Arm 1 day          Drain            Suprapubic Catheter -- days                Labs studies:   I have personally reviewed pertinent labs  Results from last 7 days   Lab Units 07/09/21  0407 07/08/21  1706 07/08/21  0502 07/07/21  0428 07/04/21  0654   POTASSIUM mmol/L 4 1  --  3 7 4 0 4 2   CHLORIDE mmol/L 108  --  102 103 104   CO2 mmol/L 23  --  28 29 27   CO2, I-STAT mmol/L  --  25  --   --   --    BUN mg/dL 12  --  12 10 10   CREATININE mg/dL 0 84  --  0 90 0 82 0 85   EGFR ml/min/1 73sq m 76  --  74 77 76   GLUCOSE, ISTAT mg/dl  --  204*  --   --   --    CALCIUM mg/dL 8 4  --  8 9 9 0 8 9   AST U/L  --   --   --   --  26   ALT U/L  --   --   --   --  17   ALK PHOS U/L  --   --   --   --  127*     Results from last 7 days   Lab Units 07/09/21  0407 07/08/21  1706 07/08/21  0502 07/07/21  0428   WBC Thousand/uL 9 56  --  9 99 9 24   HEMOGLOBIN g/dL 10 7*  --  11 7* 11 4*   I STAT HEMOGLOBIN g/dl  --  14 6  --   --    PLATELETS Thousands/uL 317  --  373 382     Results from last 7 days   Lab Units 07/02/21  1134   GRAM STAIN RESULT  No Polys*  1+ Gram negative rods*  Rare Gram positive cocci in pairs*       Imaging Studies:   I have personally reviewed pertinent imaging study reports and images in PACS  EKG, Pathology, and Other Studies:   I have personally reviewed pertinent reports

## 2021-07-09 NOTE — PROGRESS NOTES
Anesthesia PACU note    Pt HR 120s, appeared sinus  SBP 100s  ECG without ST changes, appeared similar to ECG from 2y ago, with faster rate  Given 2 5 mg IV metoprolol, HR ~100  HD stable, otherwise meets criteria to discharge PACU  SLIM team aware and saw patient      Kimberly Valverde MD Anesthesia

## 2021-07-09 NOTE — PROGRESS NOTES
[facility] Podiatry - Progress Note  Patient: Kiersten Morrell 80 y o  male   MRN: [id number]  PCP: Tang Celis MD  Unit/Bed#: [facility] 320-01 Encounter: [id number]  Date Of Visit: 21    ASSESSMENT:    Kiersten Morrell is a 80 y o  male with:    1  R 2nd digit ulceration probing to bone - Apar[place] 3 - POA  2  T2DM  3  PAD  1  S/p angiogram () with successful angioplasty RLE pop and AT and 3 vessel runoff  4  Dementia      PLAN:    · Right 2nd digit dry gangrene amputation planned for   · POA, Preet Paige gave consent over the phone with Dr Sydnee Barajas today for amputation  · Continue local wound care: betadine  · Continue Antibiotics, vancomycin-per ID  · Elevation on green foam wedges or pillows when non-ambulatory  · Rest of care per primary team     Weight bearing status: WBAT    SUBJECTIVE:     The patient was seen, evaluated, and assessed at bedside today  The patient was awake, alert, and in no acute distress  No acute events overnight  He is sitting up in bed eating lunch  OBJECTIVE:     Vitals:   /56 (BP Location: Right arm)   Pulse 88   Temp (!) 97 3 °F (36 3 °C) (Oral)   Resp 14   Ht 5' 5" (1 651 m)   Wt 64 3 kg (141 lb 12 1 oz)   SpO2 99%   BMI 23 59 kg/m²     Temp (24hrs), Av 6 °F (36 4 °C), Min:97 1 °F (36 2 °C), Max:98 1 °F (36 7 °C)      Physical Exam:     General:  Alert, cooperative, and in no distress    Lower extremity exam:  Dressing were left clean dry and intact      Additional Data:     Labs:    Results from last 7 days   Lab Units 21  0407   WBC Thousand/uL 9 56   HEMOGLOBIN g/dL 10 7*   HEMATOCRIT % 33 0*   PLATELETS Thousands/uL 317   NEUTROS PCT % 87*   LYMPHS PCT % 9*   MONOS PCT % 3*   EOS PCT % 0     Results from last 7 days   Lab Units 21  0407 21  1706 21  0654   POTASSIUM mmol/L 4 1  --  4 2   CHLORIDE mmol/L 108  --  104   CO2 mmol/L 23  --  27   CO2, I-STAT mmol/L  --  25  --    BUN mg/dL 12  --  10   CREATININE mg/dL 0 84 --  0 85   CALCIUM mg/dL 8 4  --  8 9   ALK PHOS U/L  --   --  127*   ALT U/L  --   --  17   AST U/L  --   --  26   GLUCOSE, ISTAT mg/dl  --  204*  --      Results from last 7 days   Lab Units 07/08/21  0502   INR  1 24*       * I Have Reviewed All Lab Data Listed Above  Recent Cultures (last 7 days):     Results from last 7 days   Lab Units 07/02/21  1134   GRAM STAIN RESULT  No Polys*  1+ Gram negative rods*  Rare Gram positive cocci in pairs*           Imaging: I have personally reviewed pertinent films in PACS  EKG, Pathology, and Other Studies: I have personally reviewed pertinent reports  ** Please Note: Portions of the record may have been created with voice recognition software  Occasional wrong word or "sound a like" substitutions may have occurred due to the inherent limitations of voice recognition software  Read the chart carefully and recognize, using context, where substitutions have occurred   **

## 2021-07-09 NOTE — CASE MANAGEMENT
Pt  Remains in hospital with right ulcer 2nd toe  Hx left BKA  Had succeessul angioplasty right leg  Plan to amputate toe on 7/11/21  Is disoriented  Has suprapubic catheter  Has bed at [facility] when ready  BLS transport will be needed  CM following

## 2021-07-09 NOTE — PROGRESS NOTES
INTERNAL MEDICINE RESIDENCY PROGRESS NOTE     Name: Filipe Zuniga   Age & Sex: 80 y o  male   MRN: [id number]  Unit/Bed#: [facility] 320-01   Encounter: [id number]  Team: SOD Team B     PATIENT INFORMATION     Name: Filipe Zuniga   Age & Sex: 80 y o  male   MRN: [id number]  Hospital Stay Days: 13    ASSESSMENT/PLAN     Principal Problem:    Right second toe ulcer ([facility] )  Active Problems:    Dementia without behavioral disturbance ([place] [address] )    History of coronary artery disease    Peripheral vascular disease in diabetes mellitus ([place] [address] )    BPH (benign prostatic hyperplasia)    Type 2 diabetes mellitus, with long-term current use of insulin ([facility])    Diastolic heart failure ([place] [address] )    Hypertension    Anemia    Hearing difficulty of both ears    Actinic dermatitis    Constipation      * Right second toe ulcer ([facility] )  Assessment & Plan  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 confirmed by foot x-ray  Leukocytosis on admission 11 5, now 8 46  CRP of 18 4, ESR 59 on admission  Patient is afebrile, no tachycardia no tachypnea, blood pressure WNL  7/8 Arteriogram - "Successful angioplasty of the tibioperoneal trunk and anterior tibial artery due to severe stenoses  3 vessel run-off now present "    · Podiatry following, appreciate recommendations  Patient and SOLIS Perry Hardtner) now agreeable to plan for amputation of right 2nd toe, amputation to be scheduled by podaitry  Hitesh Berg would like a call before the amputation  · ID consulted  · Culture of toe ulcer taken 7/2 -> 4+ pseudomonas, 2+ MRSA  · Patient started on vancomycin/cefepime as per ID recommendations  · Previous plan for IV abx treatment changed, PICC placement cancelled, now proceeding with arteriogram and amputation, will continue IV antibiotics until after amputation  · Daily CBC  · Monitor vitals  · Wound care  · P r n  Tylenol      Constipation  Assessment & Plan  Unknown last bowel movement on admission, most recent bowel movement 7/8    · Miralax 17 g daily   · Senokot daily  · Dulcolax 10 mg suppository PRN      Actinic dermatitis  Assessment & Plan  Attending admitted noted on admission including scald and different areas mainly bilateral groin and right lower extremity  · Clotrimazole cream  · Wound care consult    Hearing difficulty of both ears  Assessment & Plan  Hearing difficulty noted on admission most likely presbycusis  Anemia  Assessment & Plan  History of anemia, hemoglobin baseline 11-13, hemoglobin stable  · Continue home ferrous sulfate 325 ; will give every other day instead of daily while inpatient  · Bowel regimen daily  · May need MMA, folate, iron panel as outpatient  · Continue to monitor daily CBC      Hypertension  Assessment & Plan  History of hypertension, blood pressure well controlled on admission 130s over 60s  · Continue home Lopressor 25 b i d   · Continue Bumex 0 5 mg daily - diastolic heart failure  · Monitor vitals    Diastolic heart failure (HCC)  Assessment & Plan  Wt Readings from Last 3 Encounters:   07/06/21 56 4 kg (124 lb 5 4 oz)   03/10/20 77 1 kg (170 lb)   09/10/19 77 1 kg (170 lb)     History of chronic diastolic heart failure, right lower extremity grade 1 edema on admission, denies shortness of breath, CTAB on admission  Plan  · Continue home Lopressor 25 b i d   · Continue home Bumex 0 5 mg q d  · Daily in/out  · Cardiac diet, Na <2G and <2L fluid     Type 2 diabetes mellitus, with long-term current use of insulin [facility])  Assessment & Plan  Lab Results   Component Value Date    HGBA1C 6 9 (H) 06/26/2021       Recent Labs     07/08/21  1415 07/08/21  1840 07/08/21  2050 07/09/21  0749   POCGLU 132 210* 224* 177*       Blood Sugar Average: Last 72 hrs:  (P) 156 2   Relatively controlled H A1c 6 9  Home medications include Lantus 12 units at noon, and metformin 500  · Hold metformin while inpatient  · Decreased Lantus to 6 units in setting of decreased oral intake and sugars in 's    · Monitor glucose and adjust as needed    BPH (benign prostatic hyperplasia)  Assessment & Plan  Known history of large prostate, came with suprapubic Hays in place  · Continue home Flomax 0 4 mg daily  · Consider holding Myrbetriq given the suprapubic Hays in place    Peripheral vascular disease in diabetes mellitus [facility])  Assessment & Plan  S/p L BKA  6/27 Lower limb arterial duplex - Diffuse disease noted throughout the femoropopliteal arteries without  significant focal stenosis  Evidence suggestive of Tibioperoneal occlusive disease  Ankle/Brachial index: Non-compressible  Metatarsal pressure and Great toe pressure unobtainable secondary to attenuated PPG waveform  PVR/ PPG tracings are dampened  · Continue ASA 81 mg daily  · Continue Plavix 75 mg daily  · See 2nd right toe ulcer assessment and plan    History of coronary artery disease  Assessment & Plan  History of coronary artery disease, denies any chest pain or palpitation on admission    Plan  · Continue aspirin 81 mg daily  · Continue Plavix 7 mg daily  · Continue atorvastatin 10 mg daily    Dementia without behavioral disturbance (HCC)  Assessment & Plan  History of dementia without behavioral disturbance, old history of stroke, AO x1 to self, baseline and stable  His niece, Daria Stevens, is POA  Contact at [phone]     Dark urine-resolved as of 7/8/2021  Assessment & Plan  Noted in suprapubic cathter bag  UA unreliable due to interfering substance  Received IV hydration  Appears to have resolved  Continue to monitor vitals      Disposition: Continue current level of care  Podiatry will schedule for right 2nd toe amputation  SUBJECTIVE     Patient seen and examined  No acute events overnight  This morning, Zaira Mckeon had no complaints  He seemed more oriented than his baseline and was asking about his procedure yesterday  He was more talkative today and was able to elaborate on answers   He was unable to recall his last bowel movement, which was 7/8 according to nursing report  He denied HA, chest pain, SOB, N/V, pain, fever/chills  OBJECTIVE     Vitals:    21 2138 21 2236 21 0538 21 0700   BP: 109/62 112/58  112/56   BP Location:    Right arm   Pulse: 97 95  88   Resp:    14   Temp:    (!) 97 3 °F (36 3 °C)   TempSrc:    Oral   SpO2:    99%   Weight:   64 3 kg (141 lb 12 1 oz)    Height:          Temperature:   Temp (24hrs), Av 6 °F (36 4 °C), Min:97 1 °F (36 2 °C), Max:98 1 °F (36 7 °C)    Temperature: (!) 97 3 °F (36 3 °C)  Intake & Output:  I/O        07 -  07 07 -  07 07 - 07/10 0700    P  O  940  480    I V  (mL/kg) 295 8 (5 2) 1791 7 (27 9)     IV Piggyback 200 520     Total Intake(mL/kg) 1435 8 (25 1) 2311 7 (36) 480 (7 5)    Urine (mL/kg/hr) 1600 (1 2) 1820 (1 2)     Stool 0      Total Output 1600 1820     Net -164 2 +491 7 +480           Unmeasured Stool Occurrence 1 x          Weights:   IBW (Ideal Body Weight): 61 5 kg    Body mass index is 23 59 kg/m²  Weight (last 2 days)     Date/Time   Weight    21 0538   64 3 (141 76)    21 0600   57 2 (126 1)            Physical Exam  Vitals reviewed  Constitutional:       General: He is not in acute distress  Cardiovascular:      Rate and Rhythm: Normal rate and regular rhythm  Pulses:           Dorsalis pedis pulses are 1+ on the right side  Posterior tibial pulses are 1+ on the right side  Heart sounds: Normal heart sounds  No murmur heard  No friction rub  No gallop  Pulmonary:      Effort: Pulmonary effort is normal       Breath sounds: Normal breath sounds  Abdominal:      General: Abdomen is flat  Bowel sounds are normal       Palpations: Abdomen is soft  Tenderness: There is no abdominal tenderness  Musculoskeletal:      Left Lower Extremity: Left leg is amputated below knee  Feet:      Comments: Black ulceration of dorsal aspect of 2nd toe on right foot    Skin:     Capillary Refill: Capillary refill takes less than 2 seconds  Neurological:      Mental Status: Mental status is at baseline  Comments: Oriented to self, place, and year       LABORATORY DATA     Labs: I have personally reviewed pertinent reports  Results from last 7 days   Lab Units 07/09/21 0407 07/08/21  1706 07/08/21  0502 07/07/21  0428 07/06/21  0505   WBC Thousand/uL 9 56  --  9 99 9 24 8 58   HEMOGLOBIN g/dL 10 7*  --  11 7* 11 4* 11 2*   I STAT HEMOGLOBIN g/dl  --  14 6  --   --   --    HEMATOCRIT % 33 0*  --  36 1* 34 8* 34 7*   HEMATOCRIT, ISTAT %  --  43  --   --   --    PLATELETS Thousands/uL 317  --  373 382 348   NEUTROS PCT % 87*  --   --  70 68   MONOS PCT % 3*  --   --  6 7      Results from last 7 days   Lab Units 07/09/21 0407 07/08/21  1706 07/08/21  0502 07/07/21  0428 07/04/21  0654   POTASSIUM mmol/L 4 1  --  3 7 4 0 4 2   CHLORIDE mmol/L 108  --  102 103 104   CO2 mmol/L 23  --  28 29 27   CO2, I-STAT mmol/L  --  25  --   --   --    BUN mg/dL 12  --  12 10 10   CREATININE mg/dL 0 84  --  0 90 0 82 0 85   CALCIUM mg/dL 8 4  --  8 9 9 0 8 9   ALK PHOS U/L  --   --   --   --  127*   ALT U/L  --   --   --   --  17   AST U/L  --   --   --   --  26   GLUCOSE, ISTAT mg/dl  --  204*  --   --   --      Results from last 7 days   Lab Units 07/04/21  0654   MAGNESIUM mg/dL 2 2          Results from last 7 days   Lab Units 07/08/21  0502   INR  1 24*               IMAGING & DIAGNOSTIC TESTING     Radiology Results: I have personally reviewed pertinent reports  XR foot 3+ views RIGHT    Result Date: 6/27/2021  Impression: 2nd mid phalanx, 2nd distal phalanx osteomyelitis suspected  The study was marked in [facility] for immediate notification  Workstation performed: [id number]     Other Diagnostic Testing: I have personally reviewed pertinent reports      ACTIVE MEDICATIONS     Current Facility-Administered Medications   Medication Dose Route Frequency    acetaminophen (TYLENOL) tablet 488 mg  488 mg Oral Q6H PRN    aspirin (ECOTRIN LOW STRENGTH) EC tablet 81 mg  81 mg Oral Daily    atorvastatin (LIPITOR) tablet 40 mg  40 mg Oral Daily With Dinner    bisacodyl (DULCOLAX) rectal suppository 10 mg  10 mg Rectal Daily PRN    bumetanide (BUMEX) tablet 0 5 mg  0 5 mg Oral Daily    cefepime (MAXIPIME) 2,000 mg in dextrose 5 % 50 mL IVPB  2,000 mg Intravenous Q12H    cholecalciferol (VITAMIN D3) tablet 1,000 Units  1,000 Units Oral Daily    clopidogrel (PLAVIX) tablet 75 mg  75 mg Oral Daily    clotrimazole (LOTRIMIN) 1 % cream   Topical BID    enoxaparin (LOVENOX) subcutaneous injection 40 mg  40 mg Subcutaneous Daily    fentaNYL (SUBLIMAZE) injection 25 mcg  25 mcg Intravenous Q3 min PRN    ferrous sulfate tablet 325 mg  325 mg Oral Daily With Breakfast    fluticasone (FLONASE) 50 mcg/act nasal spray 1 spray  1 spray Each Nare Daily    insulin glargine (LANTUS) subcutaneous injection 6 Units 0 06 mL  6 Units Subcutaneous Daily    insulin lispro (HumaLOG) 100 units/mL subcutaneous injection 1-5 Units  1-5 Units Subcutaneous 4x Daily (AC & HS)    melatonin tablet 3 mg  3 mg Oral HS    metoprolol tartrate (LOPRESSOR) tablet 25 mg  25 mg Oral Q12H AGGIE    ondansetron (ZOFRAN) injection 4 mg  4 mg Intravenous Once PRN    pantoprazole (PROTONIX) EC tablet 40 mg  40 mg Oral BID    polyethylene glycol (MIRALAX) packet 17 g  17 g Oral Daily    saccharomyces boulardii (FLORASTOR) capsule 250 mg  250 mg Oral BID    senna-docusate sodium (SENOKOT S) 8 6-50 mg per tablet 2 tablet  2 tablet Oral HS    tamsulosin (FLOMAX) capsule 0 4 mg  0 4 mg Oral Daily With Dinner    vancomycin (VANCOCIN) IVPB (premix in dextrose) 750 mg 150 mL  750 mg Intravenous Q12H       VTE Pharmacologic Prophylaxis: Enoxaparin (Lovenox)  VTE Mechanical Prophylaxis: sequential compression device    Portions of the record may have been created with voice recognition software    Occasional wrong word or "sound a like" substitutions may have occurred due to the inherent limitations of voice recognition software    Read the chart carefully and recognize, using context, where substitutions have occurred   ==  [address] [facility]  MS4

## 2021-07-09 NOTE — PROGRESS NOTES
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 (amputation date TBD)  Vancomycin Assessment:  1  ID Consult: Yes  2  Cultures:    Culture Tissue: 4+ Pseudomonas aeruginosa, 2+ MRSA  3  Procalcitonin:   n/a  4  Renal Function:   SCr: 0 84  CrCl: 45 mL/min  UOP: 1 2 mL/kg/hr  5  Days of Therapy: 5  6  Current Dose: 750 mg IV q12h  7  Last Level: 24 6 (trough  at 1336)  8  Goal AUC(24h): 400-600  9  Goal Random/Trough: 15-20     Vancomycin Plan:  1  Evaluation: trough was supratherapeutic, will adjust regimen  2  New Dosin mg IV q24h (to start today at 1600)  Predicted Trough / AUC(24h): 17 4 / 630  3  Next Level: trough  at 1530        Pharmacy will continue to follow closely for s/sx of nephrotoxicity, infusion reactions, and appropriateness of therapy  BMP and CBC will be ordered per protocol  We will continue to follow the patients culture results and clinical progress daily  Antoinette Ortega, MichaelD  Internal Medicine Clinical Pharmacist Specialist  [phone]  [facility]/Teams

## 2021-07-10 ENCOUNTER — ANESTHESIA EVENT (INPATIENT)
Dept: PERIOP | Facility: HOSPITAL | Age: 86
DRG: 617 | End: 2021-07-10
Payer: MEDICARE

## 2021-07-10 LAB
ANION GAP SERPL CALCULATED.3IONS-SCNC: 7 MMOL/L (ref 4–13)
BASOPHILS # BLD AUTO: 0.04 THOUSANDS/ΜL (ref 0–0.1)
BASOPHILS NFR BLD AUTO: 0 % (ref 0–1)
BUN SERPL-MCNC: 16 MG/DL (ref 5–25)
CALCIUM SERPL-MCNC: 8.8 MG/DL (ref 8.3–10.1)
CHLORIDE SERPL-SCNC: 107 MMOL/L (ref 100–108)
CO2 SERPL-SCNC: 23 MMOL/L (ref 21–32)
CREAT SERPL-MCNC: 1.06 MG/DL (ref 0.6–1.3)
EOSINOPHIL # BLD AUTO: 0.07 THOUSAND/ΜL (ref 0–0.61)
EOSINOPHIL NFR BLD AUTO: 1 % (ref 0–6)
ERYTHROCYTE [DISTWIDTH] IN BLOOD BY AUTOMATED COUNT: 13.9 % (ref 11.6–15.1)
GFR SERPL CREATININE-BSD FRML MDRD: 61 ML/MIN/1.73SQ M
GLUCOSE SERPL-MCNC: 151 MG/DL (ref 65–140)
GLUCOSE SERPL-MCNC: 186 MG/DL (ref 65–140)
GLUCOSE SERPL-MCNC: 197 MG/DL (ref 65–140)
GLUCOSE SERPL-MCNC: 201 MG/DL (ref 65–140)
GLUCOSE SERPL-MCNC: 209 MG/DL (ref 65–140)
HCT VFR BLD AUTO: 34.3 % (ref 36.5–49.3)
HGB BLD-MCNC: 10.9 G/DL (ref 12–17)
IMM GRANULOCYTES # BLD AUTO: 0.1 THOUSAND/UL (ref 0–0.2)
IMM GRANULOCYTES NFR BLD AUTO: 1 % (ref 0–2)
LYMPHOCYTES # BLD AUTO: 1.63 THOUSANDS/ΜL (ref 0.6–4.47)
LYMPHOCYTES NFR BLD AUTO: 14 % (ref 14–44)
MCH RBC QN AUTO: 31.3 PG (ref 26.8–34.3)
MCHC RBC AUTO-ENTMCNC: 31.8 G/DL (ref 31.4–37.4)
MCV RBC AUTO: 99 FL (ref 82–98)
MONOCYTES # BLD AUTO: 0.66 THOUSAND/ΜL (ref 0.17–1.22)
MONOCYTES NFR BLD AUTO: 6 % (ref 4–12)
NEUTROPHILS # BLD AUTO: 9.03 THOUSANDS/ΜL (ref 1.85–7.62)
NEUTS SEG NFR BLD AUTO: 78 % (ref 43–75)
NRBC BLD AUTO-RTO: 0 /100 WBCS
PLATELET # BLD AUTO: 390 THOUSANDS/UL (ref 149–390)
PMV BLD AUTO: 9.9 FL (ref 8.9–12.7)
POTASSIUM SERPL-SCNC: 4.1 MMOL/L (ref 3.5–5.3)
RBC # BLD AUTO: 3.48 MILLION/UL (ref 3.88–5.62)
SODIUM SERPL-SCNC: 137 MMOL/L (ref 136–145)
WBC # BLD AUTO: 11.53 THOUSAND/UL (ref 4.31–10.16)

## 2021-07-10 PROCEDURE — 85025 COMPLETE CBC W/AUTO DIFF WBC: CPT | Performed by: STUDENT IN AN ORGANIZED HEALTH CARE EDUCATION/TRAINING PROGRAM

## 2021-07-10 PROCEDURE — 99232 SBSQ HOSP IP/OBS MODERATE 35: CPT | Performed by: INTERNAL MEDICINE

## 2021-07-10 PROCEDURE — 82948 REAGENT STRIP/BLOOD GLUCOSE: CPT

## 2021-07-10 PROCEDURE — 80048 BASIC METABOLIC PNL TOTAL CA: CPT | Performed by: STUDENT IN AN ORGANIZED HEALTH CARE EDUCATION/TRAINING PROGRAM

## 2021-07-10 RX ORDER — SODIUM CHLORIDE 9 MG/ML
75 INJECTION, SOLUTION INTRAVENOUS CONTINUOUS
Status: DISCONTINUED | OUTPATIENT
Start: 2021-07-11 | End: 2021-07-13 | Stop reason: HOSPADM

## 2021-07-10 RX ADMIN — ENOXAPARIN SODIUM 40 MG: 40 INJECTION SUBCUTANEOUS at 09:20

## 2021-07-10 RX ADMIN — FERROUS SULFATE TAB 325 MG (65 MG ELEMENTAL FE) 325 MG: 325 (65 FE) TAB at 09:29

## 2021-07-10 RX ADMIN — INSULIN LISPRO 1 UNITS: 100 INJECTION, SOLUTION INTRAVENOUS; SUBCUTANEOUS at 13:02

## 2021-07-10 RX ADMIN — INSULIN LISPRO 1 UNITS: 100 INJECTION, SOLUTION INTRAVENOUS; SUBCUTANEOUS at 17:44

## 2021-07-10 RX ADMIN — CEFEPIME HYDROCHLORIDE 2000 MG: 2 INJECTION, POWDER, FOR SOLUTION INTRAVENOUS at 09:38

## 2021-07-10 RX ADMIN — INSULIN LISPRO 1 UNITS: 100 INJECTION, SOLUTION INTRAVENOUS; SUBCUTANEOUS at 09:21

## 2021-07-10 RX ADMIN — VANCOMYCIN HYDROCHLORIDE 1250 MG: 1 INJECTION, POWDER, LYOPHILIZED, FOR SOLUTION INTRAVENOUS at 17:45

## 2021-07-10 RX ADMIN — PANTOPRAZOLE SODIUM 40 MG: 40 TABLET, DELAYED RELEASE ORAL at 17:45

## 2021-07-10 RX ADMIN — CLOPIDOGREL BISULFATE 75 MG: 75 TABLET ORAL at 09:29

## 2021-07-10 RX ADMIN — MELATONIN TAB 3 MG 3 MG: 3 TAB at 21:36

## 2021-07-10 RX ADMIN — CLOTRIMAZOLE: 0.01 CREAM TOPICAL at 09:31

## 2021-07-10 RX ADMIN — METOPROLOL TARTRATE 25 MG: 25 TABLET, FILM COATED ORAL at 21:36

## 2021-07-10 RX ADMIN — Medication 250 MG: at 17:45

## 2021-07-10 RX ADMIN — PANTOPRAZOLE SODIUM 40 MG: 40 TABLET, DELAYED RELEASE ORAL at 09:29

## 2021-07-10 RX ADMIN — TAMSULOSIN HYDROCHLORIDE 0.4 MG: 0.4 CAPSULE ORAL at 17:45

## 2021-07-10 RX ADMIN — CLOTRIMAZOLE: 0.01 CREAM TOPICAL at 21:41

## 2021-07-10 RX ADMIN — POLYETHYLENE GLYCOL 3350 17 G: 17 POWDER, FOR SOLUTION ORAL at 09:20

## 2021-07-10 RX ADMIN — Medication 250 MG: at 09:30

## 2021-07-10 RX ADMIN — DOCUSATE SODIUM AND SENNOSIDES 2 TABLET: 8.6; 5 TABLET ORAL at 21:36

## 2021-07-10 RX ADMIN — ATORVASTATIN CALCIUM 40 MG: 40 TABLET, FILM COATED ORAL at 17:45

## 2021-07-10 RX ADMIN — SODIUM CHLORIDE 75 ML/HR: 0.9 INJECTION, SOLUTION INTRAVENOUS at 23:14

## 2021-07-10 RX ADMIN — ASPIRIN 81 MG: 81 TABLET, COATED ORAL at 09:27

## 2021-07-10 RX ADMIN — INSULIN GLARGINE 6 UNITS: 100 INJECTION, SOLUTION SUBCUTANEOUS at 09:37

## 2021-07-10 RX ADMIN — Medication 1000 UNITS: at 09:36

## 2021-07-10 RX ADMIN — CEFEPIME HYDROCHLORIDE 2000 MG: 2 INJECTION, POWDER, FOR SOLUTION INTRAVENOUS at 21:36

## 2021-07-10 RX ADMIN — INSULIN LISPRO 1 UNITS: 100 INJECTION, SOLUTION INTRAVENOUS; SUBCUTANEOUS at 21:42

## 2021-07-10 NOTE — CONSULTS
Vancomycin IV Pharmacy-to-Dose Consultation    Mike Sosa is a 80 y o  male who is currently receiving Vancomycin IV with management by the Pharmacy Consult service  Assessment/Plan:  The patient was reviewed  No signs or symptoms of infusion reactions were documented in the chart  Based on todays assessment, continue current vancomycin (day # 6) dosing of 1250 mg Q24H, with a plan for trough to be drawn at 1530 on 7/12  We will continue to follow the patients culture results and clinical progress daily      Liz Bolanos, MichaelD  Emergency Medicine Clinical Pharmacist

## 2021-07-10 NOTE — PROGRESS NOTES
INTERNAL MEDICINE RESIDENCY PROGRESS NOTE     PATIENT INFORMATION     Name: Sotero Mccurdy   Age & Sex: 80 y o  male   MRN: [id number]  Hospital Stay Days: 14  Unit/Bed#: [facility] 320-01   Encounter: [id number]  Team: SOD Team B     ASSESSMENT/PLAN     Principal Problem:    Right second toe ulcer ([facility] )  Active Problems:    Dementia without behavioral disturbance ([place] [address] )    History of coronary artery disease    Peripheral vascular disease in diabetes mellitus ([place] [address] )    BPH (benign prostatic hyperplasia)    Type 2 diabetes mellitus, with long-term current use of insulin ([facility])    Diastolic heart failure ([place] [address] )    Hypertension    Anemia    Hearing difficulty of both ears    Actinic dermatitis    Constipation    Constipation  Assessment & Plan  Unknown last bowel movement on admission, most recent bowel movement 7/8  · Miralax 17 g daily   · Senokot daily  · Dulcolax 10 mg suppository PRN      Actinic dermatitis  Assessment & Plan  Attending admitted noted on admission including scald and different areas mainly bilateral groin and right lower extremity  · Clotrimazole cream  · Wound care consult    Hearing difficulty of both ears  Assessment & Plan  Hearing difficulty noted on admission most likely presbycusis         Anemia  Assessment & Plan  History of anemia, hemoglobin baseline 11-13, hemoglobin stable  · Continue home ferrous sulfate 325 ; will give every other day instead of daily while inpatient  · Bowel regimen daily  · May need MMA, folate, iron panel as outpatient  · Continue to monitor daily CBC      Hypertension  Assessment & Plan  History of hypertension, blood pressure well controlled on admission 130s over 60s  · Continue home Lopressor 25 b i d   · Continue Bumex 0 5 mg daily - diastolic heart failure  · Monitor vitals    Diastolic heart failure (HCC)  Assessment & Plan  Wt Readings from Last 3 Encounters:   07/09/21 64 3 kg (141 lb 12 1 oz)   03/10/20 77 1 kg (170 lb)   09/10/19 77 1 kg (170 lb)     History of chronic diastolic heart failure, right lower extremity grade 1 edema on admission, denies shortness of breath, CTAB on admission  Plan  · Continue home Lopressor 25 b i d   · Continue home Bumex 0 5 mg q d  · Daily in/out  · Cardiac diet, Na <2G and <2L fluid     Type 2 diabetes mellitus, with long-term current use of insulin [facility])  Assessment & Plan  Lab Results   Component Value Date    HGBA1C 6 9 (H) 06/26/2021       Recent Labs     07/09/21  1155 07/09/21  1549 07/09/21  2115 07/10/21  0711   POCGLU 199* 231* 227* 151*       Blood Sugar Average: Last 72 hrs:  (P) 112 [id number]   Relatively controlled H A1c 6 9  Home medications include Lantus 12 units at noon, and metformin 500  · Hold metformin while inpatient  · Decreased Lantus to 6 units in setting of decreased oral intake and sugars in 's  · Monitor glucose and adjust as needed    BPH (benign prostatic hyperplasia)  Assessment & Plan  Known history of large prostate, came with suprapubic Hays in place  · Continue home Flomax 0 4 mg daily  · Consider holding Myrbetriq given the suprapubic Hays in place    Peripheral vascular disease in diabetes mellitus [facility])  Assessment & Plan  S/p L BKA  6/27 Lower limb arterial duplex - Diffuse disease noted throughout the femoropopliteal arteries without  significant focal stenosis  Evidence suggestive of Tibioperoneal occlusive disease  Ankle/Brachial index: Non-compressible  Metatarsal pressure and Great toe pressure unobtainable secondary to attenuated PPG waveform  PVR/ PPG tracings are dampened    · Continue ASA 81 mg daily  · Continue Plavix 75 mg daily  · See 2nd right toe ulcer assessment and plan    History of coronary artery disease  Assessment & Plan  History of coronary artery disease, denies any chest pain or palpitation on admission    Plan  · Continue aspirin 81 mg daily  · Continue Plavix 7 mg daily  · Continue atorvastatin 10 mg daily    Dementia without behavioral disturbance [facility])  Assessment & Plan  History of dementia without behavioral disturbance, old history of stroke, AO x1 to self, baseline and stable  His niece, Marny , is POA  Contact at [phone]     * Right second toe ulcer [facility])  Assessment & Plan  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 confirmed by foot x-ray  Leukocytosis on admission 11 5, now WNL  CRP of 18 4, ESR 59 on admission  Patient is afebrile, no tachycardia no tachypnea, blood pressure WNL   Arteriogram - "Successful angioplasty of the tibioperoneal trunk and anterior tibial artery due to severe stenoses  3 vessel run-off now present "    · Podiatry following, appreciate recommendations  Patient and POA Pratik Pascual) now agreeable to plan for amputation of right 2nd toe  Mady Meg would like a call before the amputation  · Amputation of right 2nd toe planned for   · ID consulted  · Culture of toe ulcer taken  -> 4+ pseudomonas, 2+ MRSA  · Patient started on vancomycin/cefepime as per ID recommendations  · Previous plan for IV abx treatment changed, PICC placement cancelled, now proceeding with arteriogram and amputation, will continue IV antibiotics until after amputation  · Daily CBC  · Monitor vitals  · Wound care  · P r n  Tylenol      Dark urine-resolved as of 2021  Assessment & Plan  Noted in suprapubic cathter bag  UA unreliable due to interfering substance  Received IV hydration  Appears to have resolved  Continue to monitor vitals      Disposition: Inpatient     SUBJECTIVE     Patient seen and examined  No acute events overnight  Today overall the patient was feeling well and had no acute complaints  He is scheduled to undergo right 2nd toe amputation with Podiatry tomorrow morning        OBJECTIVE     Vitals:    21 0700 21 1500 21 2200 07/10/21 0700   BP: 112/56 104/57 111/56 105/58   BP Location: Right arm Right arm Left arm Left arm   Pulse: 88 95 102 99   Resp: 14 20 (!) 24 20   Temp: (!) 97 3 °F (36 3 °C) 98 8 °F (37 1 °C) 97 7 °F (36 5 °C) 97 5 °F (36 4 °C)   TempSrc: Oral Oral Oral Oral   SpO2: 99% 100% 100% 99%   Weight:       Height:          Temperature:   Temp (24hrs), Av °F (36 7 °C), Min:97 5 °F (36 4 °C), Max:98 8 °F (37 1 °C)    Temperature: 97 5 °F (36 4 °C)  Intake & Output:  I/O        07 - / 0700  07 - 07/10 0700 07/10 07 -  0700    P  O   690 355    I V  (mL/kg) 1791 7 (27 9)      IV Piggyback 520      Total Intake(mL/kg) 2311 7 (36) 690 (10 7) 355 (5 5)    Urine (mL/kg/hr) 1820 (1 2) 1800 (1 2)     Stool       Total Output 1820 1800     Net +491 7 -1110 +355                 Intake/Output Summary (Last 24 hours) at 7/10/2021 0959  Last data filed at 7/10/2021 0900  Gross per 24 hour   Intake 565 ml   Output 1800 ml   Net -1235 ml     I/O this shift:  In: 355 [P O :355]  Out: -   Weights:   IBW (Ideal Body Weight): 61 5 kg    Body mass index is 23 59 kg/m²  Weight (last 2 days)     Date/Time   Weight    21 0538   64 3 (141 76)            Physical Exam  Constitutional:       Appearance: He is well-developed  HENT:      Head: Normocephalic and atraumatic  Nose: Nose normal    Eyes:      General:         Right eye: No discharge  Left eye: No discharge  Conjunctiva/sclera: Conjunctivae normal       Pupils: Pupils are equal, round, and reactive to light  Neck:      Thyroid: No thyromegaly  Cardiovascular:      Rate and Rhythm: Normal rate and regular rhythm  Heart sounds: Normal heart sounds  No murmur heard  No friction rub  No gallop  Pulmonary:      Effort: Pulmonary effort is normal  No respiratory distress  Breath sounds: Normal breath sounds  No stridor  No wheezing or rales  Chest:      Chest wall: No tenderness  Abdominal:      General: Bowel sounds are normal  There is no distension  Palpations: Abdomen is soft  There is no mass  Tenderness: There is no abdominal tenderness   There is no guarding or rebound  Musculoskeletal:         General: No tenderness or deformity  Right lower leg: No edema  Left lower leg: No edema  Comments: Left BKA   Lymphadenopathy:      Cervical: No cervical adenopathy  Skin:     General: Skin is warm and dry  Comments: Gangrenous ulcer of right 2nd toe   Neurological:      Mental Status: He is alert  Psychiatric:         Mood and Affect: Mood normal          Behavior: Behavior normal          Thought Content: Thought content normal          Judgment: Judgment normal         LABORATORY DATA     Labs: I have personally reviewed pertinent reports      Results from last 7 days   Lab Units 07/09/21 0407 07/08/21  1706 07/08/21  0502 07/07/21  0428 07/06/21  0505   WBC Thousand/uL 9 56  --  9 99 9 24 8 58   HEMOGLOBIN g/dL 10 7*  --  11 7* 11 4* 11 2*   I STAT HEMOGLOBIN g/dl  --  14 6  --   --   --    HEMATOCRIT % 33 0*  --  36 1* 34 8* 34 7*   HEMATOCRIT, ISTAT %  --  43  --   --   --    PLATELETS Thousands/uL 317  --  373 382 348   NEUTROS PCT % 87*  --   --  70 68   MONOS PCT % 3*  --   --  6 7      Results from last 7 days   Lab Units 07/09/21 0407 07/08/21  1706 07/08/21  0502 07/07/21  0428 07/04/21  0654   POTASSIUM mmol/L 4 1  --  3 7 4 0 4 2   CHLORIDE mmol/L 108  --  102 103 104   CO2 mmol/L 23  --  28 29 27   CO2, I-STAT mmol/L  --  25  --   --   --    BUN mg/dL 12  --  12 10 10   CREATININE mg/dL 0 84  --  0 90 0 82 0 85   CALCIUM mg/dL 8 4  --  8 9 9 0 8 9   ALK PHOS U/L  --   --   --   --  127*   ALT U/L  --   --   --   --  17   AST U/L  --   --   --   --  26   GLUCOSE, ISTAT mg/dl  --  204*  --   --   --      Serum creatinine: 0 84 mg/dL 07/09/21 0407  Estimated creatinine clearance: 48 8 mL/min   Results from last 7 days   Lab Units 07/04/21  0654   MAGNESIUM mg/dL 2 2          Results from last 7 days   Lab Units 07/08/21  0502   INR  1 24*               IMAGING & DIAGNOSTIC TESTING     Radiology Results: I have personally reviewed pertinent reports  XR foot 3+ views RIGHT    Result Date: 6/27/2021  Impression: 2nd mid phalanx, 2nd distal phalanx osteomyelitis suspected  The study was marked in [facility] for immediate notification  Workstation performed: [id number]     Other Diagnostic Testing: I have personally reviewed pertinent reports        ACTIVE MEDICATIONS     Current Facility-Administered Medications   Medication Dose Route Frequency    acetaminophen (TYLENOL) tablet 488 mg  488 mg Oral Q6H PRN    aspirin (ECOTRIN LOW STRENGTH) EC tablet 81 mg  81 mg Oral Daily    atorvastatin (LIPITOR) tablet 40 mg  40 mg Oral Daily With Dinner    bisacodyl (DULCOLAX) rectal suppository 10 mg  10 mg Rectal Daily PRN    bumetanide (BUMEX) tablet 0 5 mg  0 5 mg Oral Daily    cefepime (MAXIPIME) 2,000 mg in dextrose 5 % 50 mL IVPB  2,000 mg Intravenous Q12H    cholecalciferol (VITAMIN D3) tablet 1,000 Units  1,000 Units Oral Daily    clopidogrel (PLAVIX) tablet 75 mg  75 mg Oral Daily    clotrimazole (LOTRIMIN) 1 % cream   Topical BID    enoxaparin (LOVENOX) subcutaneous injection 40 mg  40 mg Subcutaneous Daily    fentaNYL (SUBLIMAZE) injection 25 mcg  25 mcg Intravenous Q3 min PRN    ferrous sulfate tablet 325 mg  325 mg Oral Daily With Breakfast    fluticasone (FLONASE) 50 mcg/act nasal spray 1 spray  1 spray Each Nare Daily    insulin glargine (LANTUS) subcutaneous injection 6 Units 0 06 mL  6 Units Subcutaneous Daily    insulin lispro (HumaLOG) 100 units/mL subcutaneous injection 1-5 Units  1-5 Units Subcutaneous 4x Daily (AC & HS)    melatonin tablet 3 mg  3 mg Oral HS    metoprolol tartrate (LOPRESSOR) tablet 25 mg  25 mg Oral Q12H AGGIE    ondansetron (ZOFRAN) injection 4 mg  4 mg Intravenous Once PRN    pantoprazole (PROTONIX) EC tablet 40 mg  40 mg Oral BID    polyethylene glycol (MIRALAX) packet 17 g  17 g Oral Daily    saccharomyces boulardii (FLORASTOR) capsule 250 mg  250 mg Oral BID    senna-docusate sodium (SENOKOT S) 8 6-50 mg per tablet 2 tablet  2 tablet Oral HS    tamsulosin (FLOMAX) capsule 0 4 mg  0 4 mg Oral Daily With Dinner    vancomycin (VANCOCIN) 1,250 mg in sodium chloride 0 9 % 250 mL IVPB  17 5 mg/kg Intravenous Q24H     VTE Pharmacologic Prophylaxis: Enoxaparin (Lovenox)  VTE Mechanical Prophylaxis: sequential compression device    ==  Emery Alcaraz MD  IM Resident, PGY-2  [facility]

## 2021-07-10 NOTE — PLAN OF CARE
Problem: SKIN/TISSUE INTEGRITY - ADULT  Goal: Incision(s), wounds(s) or drain site(s) healing without S/S of infection  Description: INTERVENTIONS  - Assess and document dressing, incision, wound bed, drain sites and surrounding tissue  - Provide patient and family education  - Perform skin care/dressing changes every   Outcome: Progressing  Goal: Pressure injury heals and does not worsen  Description: Interventions:  - Implement low air loss mattress or specialty surface (Criteria met)  - Apply silicone foam dressing  - Instruct/assist with weight shifting every  minutes when in chair   - Limit chair time to hour intervals  - Use special pressure reducing interventions such as when in chair   - Apply fecal or urinary incontinence containment device   - Perform passive or active ROM every - Turn and reposition patient & offload bony prominences every  hours   - Utilize friction reducing device or surface for transfers   - Consider consults to  interdisciplinary teams such as   - Use incontinent care products after each incontinent episode such as   - Consider nutrition services referral as needed  Outcome: Progressing

## 2021-07-11 ENCOUNTER — ANESTHESIA (INPATIENT)
Dept: PERIOP | Facility: HOSPITAL | Age: 86
DRG: 617 | End: 2021-07-11
Payer: MEDICARE

## 2021-07-11 ENCOUNTER — APPOINTMENT (INPATIENT)
Dept: RADIOLOGY | Facility: HOSPITAL | Age: 86
DRG: 617 | End: 2021-07-11
Payer: MEDICARE

## 2021-07-11 LAB
ANION GAP SERPL CALCULATED.3IONS-SCNC: 4 MMOL/L (ref 4–13)
ATRIAL RATE: 108 BPM
ATRIAL RATE: 121 BPM
ATRIAL RATE: 122 BPM
BASOPHILS # BLD AUTO: 0.05 THOUSANDS/ΜL (ref 0–0.1)
BASOPHILS NFR BLD AUTO: 1 % (ref 0–1)
BUN SERPL-MCNC: 14 MG/DL (ref 5–25)
CALCIUM SERPL-MCNC: 8.9 MG/DL (ref 8.3–10.1)
CHLORIDE SERPL-SCNC: 111 MMOL/L (ref 100–108)
CO2 SERPL-SCNC: 25 MMOL/L (ref 21–32)
CREAT SERPL-MCNC: 0.82 MG/DL (ref 0.6–1.3)
EOSINOPHIL # BLD AUTO: 0.17 THOUSAND/ΜL (ref 0–0.61)
EOSINOPHIL NFR BLD AUTO: 2 % (ref 0–6)
ERYTHROCYTE [DISTWIDTH] IN BLOOD BY AUTOMATED COUNT: 13.6 % (ref 11.6–15.1)
GFR SERPL CREATININE-BSD FRML MDRD: 77 ML/MIN/1.73SQ M
GLUCOSE SERPL-MCNC: 113 MG/DL (ref 65–140)
GLUCOSE SERPL-MCNC: 120 MG/DL (ref 65–140)
GLUCOSE SERPL-MCNC: 127 MG/DL (ref 65–140)
GLUCOSE SERPL-MCNC: 137 MG/DL (ref 65–140)
GLUCOSE SERPL-MCNC: 162 MG/DL (ref 65–140)
GLUCOSE SERPL-MCNC: 192 MG/DL (ref 65–140)
HCT VFR BLD AUTO: 30.2 % (ref 36.5–49.3)
HGB BLD-MCNC: 9.7 G/DL (ref 12–17)
IMM GRANULOCYTES # BLD AUTO: 0.05 THOUSAND/UL (ref 0–0.2)
IMM GRANULOCYTES NFR BLD AUTO: 1 % (ref 0–2)
LYMPHOCYTES # BLD AUTO: 2.08 THOUSANDS/ΜL (ref 0.6–4.47)
LYMPHOCYTES NFR BLD AUTO: 21 % (ref 14–44)
MCH RBC QN AUTO: 31.3 PG (ref 26.8–34.3)
MCHC RBC AUTO-ENTMCNC: 32.1 G/DL (ref 31.4–37.4)
MCV RBC AUTO: 97 FL (ref 82–98)
MONOCYTES # BLD AUTO: 0.64 THOUSAND/ΜL (ref 0.17–1.22)
MONOCYTES NFR BLD AUTO: 7 % (ref 4–12)
NEUTROPHILS # BLD AUTO: 6.71 THOUSANDS/ΜL (ref 1.85–7.62)
NEUTS SEG NFR BLD AUTO: 68 % (ref 43–75)
NRBC BLD AUTO-RTO: 0 /100 WBCS
P AXIS: 41 DEGREES
PLATELET # BLD AUTO: 311 THOUSANDS/UL (ref 149–390)
PLATELET # BLD AUTO: 318 THOUSANDS/UL (ref 149–390)
PMV BLD AUTO: 9.9 FL (ref 8.9–12.7)
PMV BLD AUTO: 9.9 FL (ref 8.9–12.7)
POTASSIUM SERPL-SCNC: 4.1 MMOL/L (ref 3.5–5.3)
PR INTERVAL: 283 MS
PR INTERVAL: 524 MS
QRS AXIS: -77 DEGREES
QRS AXIS: -82 DEGREES
QRS AXIS: -87 DEGREES
QRSD INTERVAL: 125 MS
QRSD INTERVAL: 129 MS
QRSD INTERVAL: 92 MS
QT INTERVAL: 354 MS
QT INTERVAL: 383 MS
QT INTERVAL: 400 MS
QTC INTERVAL: 505 MS
QTC INTERVAL: 537 MS
QTC INTERVAL: 544 MS
RBC # BLD AUTO: 3.1 MILLION/UL (ref 3.88–5.62)
SODIUM SERPL-SCNC: 140 MMOL/L (ref 136–145)
T WAVE AXIS: 59 DEGREES
T WAVE AXIS: 66 DEGREES
T WAVE AXIS: 83 DEGREES
VENTRICULAR RATE: 108 BPM
VENTRICULAR RATE: 121 BPM
VENTRICULAR RATE: 122 BPM
WBC # BLD AUTO: 9.7 THOUSAND/UL (ref 4.31–10.16)

## 2021-07-11 PROCEDURE — 82948 REAGENT STRIP/BLOOD GLUCOSE: CPT

## 2021-07-11 PROCEDURE — 85049 AUTOMATED PLATELET COUNT: CPT

## 2021-07-11 PROCEDURE — 88311 DECALCIFY TISSUE: CPT | Performed by: PATHOLOGY

## 2021-07-11 PROCEDURE — 0Y6R0Z0 DETACHMENT AT RIGHT 2ND TOE, COMPLETE, OPEN APPROACH: ICD-10-PCS | Performed by: PODIATRIST

## 2021-07-11 PROCEDURE — 28820 AMPUTATION OF TOE: CPT | Performed by: PODIATRIST

## 2021-07-11 PROCEDURE — 88305 TISSUE EXAM BY PATHOLOGIST: CPT | Performed by: PATHOLOGY

## 2021-07-11 PROCEDURE — NC001 PR NO CHARGE: Performed by: INTERNAL MEDICINE

## 2021-07-11 PROCEDURE — 93010 ELECTROCARDIOGRAM REPORT: CPT | Performed by: INTERNAL MEDICINE

## 2021-07-11 PROCEDURE — 99232 SBSQ HOSP IP/OBS MODERATE 35: CPT | Performed by: PODIATRIST

## 2021-07-11 PROCEDURE — 73620 X-RAY EXAM OF FOOT: CPT

## 2021-07-11 PROCEDURE — 85025 COMPLETE CBC W/AUTO DIFF WBC: CPT | Performed by: STUDENT IN AN ORGANIZED HEALTH CARE EDUCATION/TRAINING PROGRAM

## 2021-07-11 PROCEDURE — 80048 BASIC METABOLIC PNL TOTAL CA: CPT | Performed by: STUDENT IN AN ORGANIZED HEALTH CARE EDUCATION/TRAINING PROGRAM

## 2021-07-11 RX ORDER — LIDOCAINE HYDROCHLORIDE 10 MG/ML
INJECTION, SOLUTION EPIDURAL; INFILTRATION; INTRACAUDAL; PERINEURAL AS NEEDED
Status: DISCONTINUED | OUTPATIENT
Start: 2021-07-11 | End: 2021-07-11 | Stop reason: HOSPADM

## 2021-07-11 RX ORDER — BUPIVACAINE HYDROCHLORIDE 2.5 MG/ML
INJECTION, SOLUTION EPIDURAL; INFILTRATION; INTRACAUDAL AS NEEDED
Status: DISCONTINUED | OUTPATIENT
Start: 2021-07-11 | End: 2021-07-11 | Stop reason: HOSPADM

## 2021-07-11 RX ORDER — FENTANYL CITRATE/PF 50 MCG/ML
25 SYRINGE (ML) INJECTION
Status: DISCONTINUED | OUTPATIENT
Start: 2021-07-11 | End: 2021-07-11 | Stop reason: HOSPADM

## 2021-07-11 RX ORDER — LIDOCAINE HYDROCHLORIDE 10 MG/ML
INJECTION, SOLUTION EPIDURAL; INFILTRATION; INTRACAUDAL; PERINEURAL AS NEEDED
Status: DISCONTINUED | OUTPATIENT
Start: 2021-07-11 | End: 2021-07-11

## 2021-07-11 RX ORDER — SODIUM CHLORIDE 9 MG/ML
75 INJECTION, SOLUTION INTRAVENOUS CONTINUOUS
Status: DISCONTINUED | OUTPATIENT
Start: 2021-07-11 | End: 2021-07-12

## 2021-07-11 RX ORDER — FENTANYL CITRATE 50 UG/ML
INJECTION, SOLUTION INTRAMUSCULAR; INTRAVENOUS AS NEEDED
Status: DISCONTINUED | OUTPATIENT
Start: 2021-07-11 | End: 2021-07-11

## 2021-07-11 RX ADMIN — Medication 250 MG: at 17:13

## 2021-07-11 RX ADMIN — SODIUM CHLORIDE 75 ML/HR: 0.9 INJECTION, SOLUTION INTRAVENOUS at 10:29

## 2021-07-11 RX ADMIN — CLOTRIMAZOLE: 0.01 CREAM TOPICAL at 14:42

## 2021-07-11 RX ADMIN — CLOPIDOGREL BISULFATE 75 MG: 75 TABLET ORAL at 14:40

## 2021-07-11 RX ADMIN — ACETAMINOPHEN 488 MG: 325 TABLET, FILM COATED ORAL at 14:51

## 2021-07-11 RX ADMIN — TAMSULOSIN HYDROCHLORIDE 0.4 MG: 0.4 CAPSULE ORAL at 17:13

## 2021-07-11 RX ADMIN — CLOTRIMAZOLE: 0.01 CREAM TOPICAL at 17:21

## 2021-07-11 RX ADMIN — SODIUM CHLORIDE 75 ML/HR: 0.9 INJECTION, SOLUTION INTRAVENOUS at 14:58

## 2021-07-11 RX ADMIN — METOPROLOL TARTRATE 25 MG: 25 TABLET, FILM COATED ORAL at 21:14

## 2021-07-11 RX ADMIN — Medication 1000 UNITS: at 14:50

## 2021-07-11 RX ADMIN — DOCUSATE SODIUM AND SENNOSIDES 2 TABLET: 8.6; 5 TABLET ORAL at 21:14

## 2021-07-11 RX ADMIN — BUMETANIDE 0.5 MG: 0.5 TABLET ORAL at 14:41

## 2021-07-11 RX ADMIN — ASPIRIN 81 MG: 81 TABLET, COATED ORAL at 14:40

## 2021-07-11 RX ADMIN — MELATONIN TAB 3 MG 3 MG: 3 TAB at 21:14

## 2021-07-11 RX ADMIN — VANCOMYCIN HYDROCHLORIDE 1250 MG: 1 INJECTION, POWDER, LYOPHILIZED, FOR SOLUTION INTRAVENOUS at 16:38

## 2021-07-11 RX ADMIN — ATORVASTATIN CALCIUM 40 MG: 40 TABLET, FILM COATED ORAL at 17:13

## 2021-07-11 RX ADMIN — CEFEPIME HYDROCHLORIDE 2000 MG: 2 INJECTION, POWDER, FOR SOLUTION INTRAVENOUS at 21:14

## 2021-07-11 RX ADMIN — INSULIN LISPRO 1 UNITS: 100 INJECTION, SOLUTION INTRAVENOUS; SUBCUTANEOUS at 21:18

## 2021-07-11 RX ADMIN — PANTOPRAZOLE SODIUM 40 MG: 40 TABLET, DELAYED RELEASE ORAL at 17:13

## 2021-07-11 RX ADMIN — INSULIN LISPRO 1 UNITS: 100 INJECTION, SOLUTION INTRAVENOUS; SUBCUTANEOUS at 17:16

## 2021-07-11 RX ADMIN — LIDOCAINE HYDROCHLORIDE 50 MG: 10 INJECTION, SOLUTION EPIDURAL; INFILTRATION; INTRACAUDAL; PERINEURAL at 08:17

## 2021-07-11 RX ADMIN — FENTANYL CITRATE 50 MCG: 50 INJECTION INTRAMUSCULAR; INTRAVENOUS at 08:15

## 2021-07-11 RX ADMIN — INSULIN GLARGINE 6 UNITS: 100 INJECTION, SOLUTION SUBCUTANEOUS at 14:50

## 2021-07-11 RX ADMIN — CEFEPIME HYDROCHLORIDE 2000 MG: 2 INJECTION, POWDER, FOR SOLUTION INTRAVENOUS at 14:40

## 2021-07-11 NOTE — ANESTHESIA PREPROCEDURE EVALUATION
Procedure:  2nd leonid AMPUTATION (Right Toe)    Relevant Problems   CARDIO   (+) Hypertension      ENDO   (+) Type 2 diabetes mellitus, with long-term current use of insulin (HCC)      /RENAL   (+) BPH (benign prostatic hyperplasia)      HEMATOLOGY   (+) Anemia      NEURO/PSYCH   (+) Dementia without behavioral disturbance (HCC)   (+) History of coronary artery disease             Anesthesia Plan  ASA Score- 3     Anesthesia Type- IV sedation with anesthesia with ASA Monitors  Additional Monitors:   Airway Plan:     Comment: IV sedation, GA back up; standard ASA monitors  Risks and benefits discussed with patient; patient consented and agrees to proceed  I saw and evaluated the patient  If seen with CRNA, we have discussed the anesthetic plan and I am in agreement that the plan is appropriate for the patient  I spoke to carlo Esparza, for consent  Plan Factors-    Chart reviewed  Existing labs reviewed  Induction- intravenous  Postoperative Plan-     Informed Consent- Anesthetic plan and risks discussed with patient  I personally reviewed this patient with the CRNA  Discussed and agreed on the Anesthesia Plan with the CRNA  Yumi Garcia

## 2021-07-11 NOTE — ANESTHESIA POSTPROCEDURE EVALUATION
Post-Op Assessment Note    CV Status:  Stable    Pain management: adequate     Mental Status:  Alert and awake   Hydration Status:  Euvolemic   PONV Controlled:  Controlled   Airway Patency:  Patent  Airway: intubated   Two or more mitigation strategies used for obstructive sleep apnea   Post Op Vitals Reviewed: Yes      Staff: CRNA         No complications documented      BP 93/67   Temp  98 9   Pulse  99   Resp  18    SpO2   96

## 2021-07-11 NOTE — OP NOTE
OPERATIVE REPORT - Podiatry  PATIENT NAME: Keyon Valdez    :  3/31/1929  MRN: [id number]  Pt Location: BE OR ROOM 07    SURGERY DATE: 2021    Surgeon(s) and Role:     * Tr Mitchell DPM - Primary    Pre-op Diagnosis:  Toe osteomyelitis, right ([address] ) [M86 9]    Post-Op Diagnosis Codes:     * Toe osteomyelitis, right (HCC) [M86 9]    Procedure(s) (LRB):  2nd leonid AMPUTATION (Right)    Specimen(s):  ID Type Source Tests Collected by Time Destination   1 : right toe - 2nd digit  Tissue Toe, Right TISSUE EXAM Tr Mitchell DPM 2021 0831        Estimated Blood Loss:   Minimal    Drains:  Suprapubic Catheter (Active)   Site Assessment Clean;Dry 07/10/21 1900   Dressing Status Open to air 07/10/21 1900   Dressing Type Open to air 21 1601   Collection Container Standard drainage bag 07/10/21 1900   Reason for Continuing Urinary Catheterization past POD 1 Other (Comment) 07/10/21 1900   Output (mL) 600 mL 21 0601   Number of days:        Anesthesia Type:   Choice with 10 ml of 1% Lidocaine and 0 25% Bupivacaine in a 1:1 mixture    Hemostasis:  Direct compression, electrocautery    Materials:  * No implants in log *      Injectables:  None    Operative Findings:  Consistent with Diagnosis    Complications:   None    Procedure and Technique:     Under mild sedation, the patient was brought into the operating room and remained on gurney in the supine position  IV sedation was achieved by anesthesia team and a universal timeout was performed where all parties are in agreement of correct patient, correct procedure and correct site  A pneumatic tourniquet was then placed over the patient's right lower extremity with ample padding  A second digit block was performed consisting of 10 ml of 1% Lidocaine and 0 25% Bupivacaine in a 1:1 mixture  The foot was then prepped and draped in the usual aseptic manner       Attention was directed to the Laterally: right 2 digit where a double ellipse type of incision was made  Utilizing a sterile 15 blade, this incision was carried deep straight to bone  Soft tissue structures were then reflected off the second metatarsal head  Utilizing a sterile 15 blade, all capsular structures were severed and the toe was then disarticulated at the level of the 2 MTPJ and then placed on the back table  It was noted that all tissue margins were bleeding and viable  No deep sinus tracts or areas of purulence were visualized  The remaining bone on the proximal aspect of the joint was noted to be of hard and viable quality  Any remaining tendinous structures were identified and severed proximally to remove any nidus of infection  The surgical incision was irrigated with copious amounts of normal sterile saline  Skin edges were reapproximated and closure was obtained utilizing 3-0 nylon  The foot was then cleansed and dried  The incision site was dressed with xeroform, gauze  This was then covered with a Renato and an ACE wrap  The tourniquet was not needed  The patient tolerated the procedure and anesthesia well without immediate complications and transferred to PACU with vital signs stable  As with many limb salvage procedures, we contemplate the possibility of performing further stages to this procedure  Procedures may include debridements, delayed closure, plastic surgery techniques, or more proximal amputations  This procedure may be considered part of a multi-staged limb salvage treatment plan  Dr Jeremy Jimenez was present during the entire procedure and participated in all key aspects  SIGNATURE: Jarrett Diaz DPM  DATE: July 11, 2021  TIME: 9:03 AM      Portions of the record may have been created with voice recognition software  Occasional wrong word or "sound a like" substitutions may have occurred due to the inherent limitations of voice recognition software  Read the chart carefully and recognize, using context, where substitutions have occurred

## 2021-07-11 NOTE — PROGRESS NOTES
[address] [facility]  Progress Note - Lizzy Stein 3/31/1929, 80 y o  male MRN: [id number]  Unit/Bed#: [facility] 320-01 Encounter: [id number]  Primary Care Provider: Manohar Joy MD   Date and time admitted to hospital: 6/26/2021  4:30 PM    Constipation  Assessment & Plan  Unknown last bowel movement on admission, most recent bowel movement 7/8  · Miralax 17 g daily   · Senokot daily  · Dulcolax 10 mg suppository PRN      Actinic dermatitis  Assessment & Plan  Attending admitted noted on admission including scald and different areas mainly bilateral groin and right lower extremity  · Clotrimazole cream  · Wound care consult    Hearing difficulty of both ears  Assessment & Plan  Hearing difficulty noted on admission most likely presbycusis  Anemia  Assessment & Plan  History of anemia, hemoglobin baseline 11-13, hemoglobin stable  · Continue home ferrous sulfate 325 ; will give every other day instead of daily while inpatient  · Bowel regimen daily  · May need MMA, folate, iron panel as outpatient  · Continue to monitor daily CBC      Hypertension  Assessment & Plan  History of hypertension, blood pressure well controlled on admission 130s over 60s  · Continue home Lopressor 25 b i d   · Continue Bumex 0 5 mg daily - diastolic heart failure  · Monitor vitals    Diastolic heart failure (HCC)  Assessment & Plan  Wt Readings from Last 3 Encounters:   07/09/21 64 3 kg (141 lb 12 1 oz)   03/10/20 77 1 kg (170 lb)   09/10/19 77 1 kg (170 lb)     History of chronic diastolic heart failure, right lower extremity grade 1 edema on admission, denies shortness of breath, CTAB on admission  Plan  · Continue home Lopressor 25 b i d   · Continue home Bumex 0 5 mg q d    · Daily in/out  · Cardiac diet, Na <2G and <2L fluid     Type 2 diabetes mellitus, with long-term current use of insulin [facility])  Assessment & Plan  Lab Results   Component Value Date    HGBA1C 6 9 (H) 06/26/2021       Recent Labs 07/09/21  1155 07/09/21  1549 07/09/21  2115 07/10/21  0711   POCGLU 199* 231* 227* 151*       Blood Sugar Average: Last 72 hrs:  (P) 527 [id number]   Relatively controlled H A1c 6 9  Home medications include Lantus 12 units at noon, and metformin 500  · Hold metformin while inpatient  · Decreased Lantus to 6 units in setting of decreased oral intake and sugars in 's  · Monitor glucose and adjust as needed    BPH (benign prostatic hyperplasia)  Assessment & Plan  Known history of large prostate, came with suprapubic Hays in place  · Continue home Flomax 0 4 mg daily  · Consider holding Myrbetriq given the suprapubic Hays in place    Peripheral vascular disease in diabetes mellitus [facility])  Assessment & Plan  S/p L BKA  6/27 Lower limb arterial duplex - Diffuse disease noted throughout the femoropopliteal arteries without  significant focal stenosis  Evidence suggestive of Tibioperoneal occlusive disease  Ankle/Brachial index: Non-compressible  Metatarsal pressure and Great toe pressure unobtainable secondary to attenuated PPG waveform  PVR/ PPG tracings are dampened  · Continue ASA 81 mg daily  · Continue Plavix 75 mg daily  · See 2nd right toe ulcer assessment and plan    History of coronary artery disease  Assessment & Plan  History of coronary artery disease, denies any chest pain or palpitation on admission    Plan  · Continue aspirin 81 mg daily  · Continue Plavix 7 mg daily  · Continue atorvastatin 10 mg daily    Dementia without behavioral disturbance (HCC)  Assessment & Plan  History of dementia without behavioral disturbance, old history of stroke, AO x1 to self, baseline and stable  His niece, Patricio Sarmiento, is POA  Contact at [phone]     * Right second toe ulcer [facility])  Assessment & Plan  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 confirmed by foot x-ray  Leukocytosis on admission 11 5, now WNL  CRP of 18 4, ESR 59 on admission   Patient is afebrile, no tachycardia no tachypnea, blood pressure WNL   Arteriogram - "Successful angioplasty of the tibioperoneal trunk and anterior tibial artery due to severe stenoses  3 vessel run-off now present "    · Podiatry following, appreciate recommendations  Patient and SOLIS Avila Layman) now agreeable to plan for amputation of right 2nd toe  Danyel Weirt would like a call before the amputation  · ID consulted  · Culture of toe ulcer taken  -> 4+ pseudomonas, 2+ MRSA  · Patient started on vancomycin/cefepime as per ID recommendations  · Previous plan for IV abx treatment changed, PICC placement cancelled, now proceeding with arteriogram and amputation, will continue IV antibiotics until after amputation  · Daily CBC  · Monitor vitals  · Wound care  · P r n  Tylenol    Amputation 21    Dark urine-resolved as of 2021  Assessment & Plan  Noted in suprapubic cathter bag  UA unreliable due to interfering substance  Received IV hydration  Appears to have resolved  Continue to monitor vitals        VTE Pharmacologic Prophylaxis:   VTE Score: 5 Moderate Risk (Score 3-4) - Pharmacological DVT Prophylaxis Ordered: Enoxaparin (Lovenox)  Mechanical VTE Prophylaxis in Place: Yes    Discussions with Specialists or Other Care Team Provider: Podiatry on board    Education and Discussions with Family / Patient: Will defer it to podiatry  Current Length of Stay: 15 day(s)    Current Patient Status: Inpatient     Discharge Plan / Estimated Discharge Date: TBD    Code Status: Level 3 - DNAR and DNI      Subjective:   Currently at baseline no acute overnight events  No new complaints  Objective:     Vitals:   Temp (24hrs), Av 4 °F (36 3 °C), Min:97 1 °F (36 2 °C), Max:97 9 °F (36 6 °C)    Temp:  [97 1 °F (36 2 °C)-97 9 °F (36 6 °C)] 97 6 °F (36 4 °C)  HR:  [] 106  Resp:  [15-20] 16  BP: ()/(53-70) 118/61  SpO2:  [94 %-99 %] 97 %  Body mass index is 23 55 kg/m²       Input and Output Summary (last 24 hours): Intake/Output Summary (Last 24 hours) at 7/11/2021 1113  Last data filed at 7/11/2021 0911  Gross per 24 hour   Intake --   Output 1600 ml   Net -1600 ml       Physical Exam:     Physical Exam  Vitals and nursing note reviewed  Constitutional:       General: He is not in acute distress  Appearance: He is not toxic-appearing  HENT:      Head: Normocephalic  Eyes:      Conjunctiva/sclera: Conjunctivae normal    Cardiovascular:      Rate and Rhythm: Normal rate  Pulses: Normal pulses  Pulmonary:      Effort: Pulmonary effort is normal  No respiratory distress  Abdominal:      General: Abdomen is flat  Musculoskeletal:      Comments: Affected toe under dressing   Skin:     General: Skin is warm  Capillary Refill: Capillary refill takes less than 2 seconds  Comments: Multiple chronic appearing skin changes noted   Neurological:      Mental Status: Mental status is at baseline        Comments: unchanged   Psychiatric:      Comments: Unable to assess            Additional Data:     Labs:  Results from last 7 days   Lab Units 07/11/21  0606   WBC Thousand/uL 9 70   HEMOGLOBIN g/dL 9 7*   HEMATOCRIT % 30 2*   PLATELETS Thousands/uL 318   NEUTROS PCT % 68   LYMPHS PCT % 21   MONOS PCT % 7   EOS PCT % 2     Results from last 7 days   Lab Units 07/11/21  0607   SODIUM mmol/L 140   POTASSIUM mmol/L 4 1   CHLORIDE mmol/L 111*   CO2 mmol/L 25   BUN mg/dL 14   CREATININE mg/dL 0 82   ANION GAP mmol/L 4   CALCIUM mg/dL 8 9   GLUCOSE RANDOM mg/dL 113     Results from last 7 days   Lab Units 07/08/21  0502   INR  1 24*     Results from last 7 days   Lab Units 07/11/21  1054 07/11/21  0917 07/11/21  0627 07/10/21  2039 07/10/21  1641 07/10/21  1128 07/10/21  0711 07/09/21  2115 07/09/21  1549 07/09/21  1155 07/09/21  0749 07/08/21  2050   POC GLUCOSE mg/dl 127 120 137 186* 209* 201* 151* 227* 231* 199* 177* 224*               Imaging: Reviewed radiology reports from this admission including: xray(s)    Recent Cultures (last 7 days):           Lines/Drains:  Invasive Devices     Peripheral Intravenous Line            Peripheral IV 07/08/21 Distal;Right;Upper;Ventral (anterior) Arm 3 days          Drain            Suprapubic Catheter -- days                Telemetry:        Last 24 Hours Medication List:   Current Facility-Administered Medications   Medication Dose Route Frequency Provider Last Rate    [MAR Hold] acetaminophen  488 mg Oral Q6H PRN Hannah Haley MD      [facility]] aspirin  81 mg Oral Daily Hannah Haley MD      [facility]] atorvastatin  40 mg Oral Daily With Cammie Alpers, MD      [facility]] bisacodyl  10 mg Rectal Daily PRN Estelita Banks DO      [facility]] bumetanide  0 5 mg Oral Daily Hannah Haley MD      [facility]] cefepime  2,000 mg Intravenous Q12H Yumiko Caraballo MD 2,000 mg (07/10/21 2136)   Nayeli Willis [ZTE Hold] cholecalciferol  1,000 Units Oral Daily Hannah Haley MD      [facility]] clopidogrel  75 mg Oral Daily Hannah Haley MD      [facility]] clotrimazole   Topical BID Eric Dumont DO      [MAR Hold] enoxaparin  40 mg Subcutaneous Daily Hannah Haley MD      [facility]] ferrous sulfate  325 mg Oral Daily With Alton Levy MD      [facility]] fluticasone  1 spray Each Nare Daily Hannah Haley MD      [facility]] insulin glargine  6 Units Subcutaneous Daily Ana Tai MD      [facility]] insulin lispro  1-5 Units Subcutaneous 4x Daily (AC & HS) Estelita Banks DO      [MAR Hold] melatonin  3 mg Oral HS Estelita Banks DO      [facility]] metoprolol tartrate  25 mg Oral Q12H [address] Hannah Haley MD      [facility]] pantoprazole  40 mg Oral BID Hannah Haley MD      [facility]] polyethylene glycol  17 g Oral Daily Layton Perez DO      [MAR Hold] saccharomyces boulardii  250 mg Oral BID Estelita Banks DO      [facility]] senna-docusate sodium  2 tablet Oral HS Layton Perez DO      sodium chloride  75 mL/hr Intravenous Continuous Hobert Se, DPM 75 mL/hr (07/11/21 0911)    sodium chloride  75 mL/hr Intravenous Continuous Reilly Hopper, CRNA 75 mL/hr (07/11/21 1029)    [MAR Hold] tamsulosin  0 4 mg Oral Daily With Jody Hills MD      [facility] Hold] vancomycin  17 5 mg/kg Intravenous Q24H Jacinta Duran MD 1,250 mg (07/10/21 0121)        Today, Patient Was Seen By: Ela Kumar MD    ** Please Note: This note has been constructed using a voice recognition system   **

## 2021-07-11 NOTE — PROGRESS NOTES
[facility] Podiatry - Pre Operative Note  Patient: Fatemeh Mcclellan 80 y o  male   MRN: [id number]  PCP: Thiago Sampson MD  Unit/Bed#: [facility] 320-01 Encounter: [id number]  Date Of Visit: 21    ASSESSMENT:    Fatemeh Mcclellan is a 80 y o  male with:    1  R 2nd digit ulceration probing to bone - Apar[place] 3 - POA  2  T2DM  3  PAD  1  S/p angiogram () with successful angioplasty RLE pop and AT and 3 vessel runoff  4  Dementia    PLAN:    · Patient to go to OR today, 21, for right 2nd digit amputation with Dr Adam Garcia  · Consent signed  · H&P, vitals, and current labs reviewed  No acute changes noted  · Alternatives, risks, and complications discussed with patient  · All questions answered  No guarantees given to outcome of procedure  · Anticipate updated weight bearing status post-procedure  PT/OT to be consulted for evaluation  Current Antibiotics: Vancomycin      SUBJECTIVE:     The patient was seen, evaluated, and assessed at bedside today  The patient was awake, alert, and in no acute distress  Patient confirmed NPO status  All questions and concerns regarding the surgical procedure addressed  Patient understands risks vs benefits of procedure and remains amenable with plan to go to OR today  Patient denies N/V/F/chills/SOB/CP  OBJECTIVE:     Vitals:   /61 (BP Location: Left arm)   Pulse 95   Temp (!) 97 1 °F (36 2 °C) (Oral)   Resp 17   Ht 5' 5" (1 651 m)   Wt 64 2 kg (141 lb 8 6 oz)   SpO2 95%   BMI 23 55 kg/m²     Temp (24hrs), Av 5 °F (36 4 °C), Min:97 1 °F (36 2 °C), Max:97 9 °F (36 6 °C)      Physical Exam:  General appearance: Alert, cooperative and no acute distress  Extremity: Dressings C/D/I and left intact to the OR  NVS at baseline B/l  MSK function at baseline B/l  No calf tenderness noted B/l      Additional Data:     Labs:    Results from last 7 days   Lab Units 07/10/21  1024   WBC Thousand/uL 11 53*   HEMOGLOBIN g/dL 10 9*   HEMATOCRIT % 34 3*   PLATELETS Thousands/uL 390   NEUTROS PCT % 78*   LYMPHS PCT % 14   MONOS PCT % 6   EOS PCT % 1     Results from last 7 days   Lab Units 07/10/21  1024 07/08/21  1706 07/04/21  0654   POTASSIUM mmol/L 4 1  --  4 2   CHLORIDE mmol/L 107  --  104   CO2 mmol/L 23  --  27   CO2, I-STAT mmol/L  --  25  --    BUN mg/dL 16  --  10   CREATININE mg/dL 1 06  --  0 85   CALCIUM mg/dL 8 8  --  8 9   ALK PHOS U/L  --   --  127*   ALT U/L  --   --  17   AST U/L  --   --  26   GLUCOSE, ISTAT mg/dl  --  204*  --      Results from last 7 days   Lab Units 07/08/21  0502   INR  1 24*       * I Have Reviewed All Lab Data Listed Above  Imaging: I have personally reviewed pertinent films in PACS  EKG, Pathology, and Other Studies: I have personally reviewed pertinent reports  ** Please Note: Portions of the record may have been created with voice recognition software  Occasional wrong word or "sound a like" substitutions may have occurred due to the inherent limitations of voice recognition software  Read the chart carefully and recognize, using context, where substitutions have occurred   **

## 2021-07-12 LAB
ALBUMIN SERPL BCP-MCNC: 2.5 G/DL (ref 3.5–5)
ALP SERPL-CCNC: 121 U/L (ref 46–116)
ALT SERPL W P-5'-P-CCNC: 13 U/L (ref 12–78)
ANION GAP SERPL CALCULATED.3IONS-SCNC: 4 MMOL/L (ref 4–13)
AST SERPL W P-5'-P-CCNC: 14 U/L (ref 5–45)
BILIRUB SERPL-MCNC: 0.66 MG/DL (ref 0.2–1)
BUN SERPL-MCNC: 12 MG/DL (ref 5–25)
CALCIUM ALBUM COR SERPL-MCNC: 10.3 MG/DL (ref 8.3–10.1)
CALCIUM SERPL-MCNC: 9.1 MG/DL (ref 8.3–10.1)
CHLORIDE SERPL-SCNC: 107 MMOL/L (ref 100–108)
CO2 SERPL-SCNC: 26 MMOL/L (ref 21–32)
CREAT SERPL-MCNC: 0.78 MG/DL (ref 0.6–1.3)
ERYTHROCYTE [DISTWIDTH] IN BLOOD BY AUTOMATED COUNT: 13.6 % (ref 11.6–15.1)
GFR SERPL CREATININE-BSD FRML MDRD: 78 ML/MIN/1.73SQ M
GLUCOSE SERPL-MCNC: 144 MG/DL (ref 65–140)
GLUCOSE SERPL-MCNC: 162 MG/DL (ref 65–140)
GLUCOSE SERPL-MCNC: 187 MG/DL (ref 65–140)
GLUCOSE SERPL-MCNC: 198 MG/DL (ref 65–140)
GLUCOSE SERPL-MCNC: 200 MG/DL (ref 65–140)
HCT VFR BLD AUTO: 32.6 % (ref 36.5–49.3)
HGB BLD-MCNC: 10.4 G/DL (ref 12–17)
MAGNESIUM SERPL-MCNC: 2.3 MG/DL (ref 1.6–2.6)
MCH RBC QN AUTO: 30.8 PG (ref 26.8–34.3)
MCHC RBC AUTO-ENTMCNC: 31.9 G/DL (ref 31.4–37.4)
MCV RBC AUTO: 96 FL (ref 82–98)
PLATELET # BLD AUTO: 319 THOUSANDS/UL (ref 149–390)
PMV BLD AUTO: 10.5 FL (ref 8.9–12.7)
POTASSIUM SERPL-SCNC: 4.3 MMOL/L (ref 3.5–5.3)
PROT SERPL-MCNC: 6.4 G/DL (ref 6.4–8.2)
RBC # BLD AUTO: 3.38 MILLION/UL (ref 3.88–5.62)
SODIUM SERPL-SCNC: 137 MMOL/L (ref 136–145)
WBC # BLD AUTO: 12.21 THOUSAND/UL (ref 4.31–10.16)

## 2021-07-12 PROCEDURE — 99232 SBSQ HOSP IP/OBS MODERATE 35: CPT | Performed by: PODIATRIST

## 2021-07-12 PROCEDURE — 82948 REAGENT STRIP/BLOOD GLUCOSE: CPT

## 2021-07-12 PROCEDURE — 83735 ASSAY OF MAGNESIUM: CPT | Performed by: INTERNAL MEDICINE

## 2021-07-12 PROCEDURE — 99232 SBSQ HOSP IP/OBS MODERATE 35: CPT | Performed by: INTERNAL MEDICINE

## 2021-07-12 PROCEDURE — 85027 COMPLETE CBC AUTOMATED: CPT | Performed by: INTERNAL MEDICINE

## 2021-07-12 PROCEDURE — 80053 COMPREHEN METABOLIC PANEL: CPT | Performed by: INTERNAL MEDICINE

## 2021-07-12 RX ADMIN — CLOTRIMAZOLE: 0.01 CREAM TOPICAL at 17:35

## 2021-07-12 RX ADMIN — INSULIN LISPRO 1 UNITS: 100 INJECTION, SOLUTION INTRAVENOUS; SUBCUTANEOUS at 08:50

## 2021-07-12 RX ADMIN — TAMSULOSIN HYDROCHLORIDE 0.4 MG: 0.4 CAPSULE ORAL at 16:30

## 2021-07-12 RX ADMIN — CEFEPIME HYDROCHLORIDE 2000 MG: 2 INJECTION, POWDER, FOR SOLUTION INTRAVENOUS at 08:55

## 2021-07-12 RX ADMIN — CLOPIDOGREL BISULFATE 75 MG: 75 TABLET ORAL at 08:49

## 2021-07-12 RX ADMIN — ENOXAPARIN SODIUM 40 MG: 40 INJECTION SUBCUTANEOUS at 08:56

## 2021-07-12 RX ADMIN — Medication 250 MG: at 08:50

## 2021-07-12 RX ADMIN — DOCUSATE SODIUM AND SENNOSIDES 2 TABLET: 8.6; 5 TABLET ORAL at 21:44

## 2021-07-12 RX ADMIN — PANTOPRAZOLE SODIUM 40 MG: 40 TABLET, DELAYED RELEASE ORAL at 17:35

## 2021-07-12 RX ADMIN — BUMETANIDE 0.5 MG: 0.5 TABLET ORAL at 08:50

## 2021-07-12 RX ADMIN — FLUTICASONE PROPIONATE 1 SPRAY: 50 SPRAY, METERED NASAL at 08:56

## 2021-07-12 RX ADMIN — ASPIRIN 81 MG: 81 TABLET, COATED ORAL at 08:56

## 2021-07-12 RX ADMIN — FERROUS SULFATE TAB 325 MG (65 MG ELEMENTAL FE) 325 MG: 325 (65 FE) TAB at 08:49

## 2021-07-12 RX ADMIN — INSULIN LISPRO 1 UNITS: 100 INJECTION, SOLUTION INTRAVENOUS; SUBCUTANEOUS at 16:30

## 2021-07-12 RX ADMIN — INSULIN GLARGINE 6 UNITS: 100 INJECTION, SOLUTION SUBCUTANEOUS at 08:49

## 2021-07-12 RX ADMIN — ATORVASTATIN CALCIUM 40 MG: 40 TABLET, FILM COATED ORAL at 16:30

## 2021-07-12 RX ADMIN — INSULIN LISPRO 1 UNITS: 100 INJECTION, SOLUTION INTRAVENOUS; SUBCUTANEOUS at 11:42

## 2021-07-12 RX ADMIN — INSULIN LISPRO 1 UNITS: 100 INJECTION, SOLUTION INTRAVENOUS; SUBCUTANEOUS at 21:43

## 2021-07-12 RX ADMIN — METOPROLOL TARTRATE 25 MG: 25 TABLET, FILM COATED ORAL at 08:49

## 2021-07-12 RX ADMIN — Medication 1000 UNITS: at 08:49

## 2021-07-12 RX ADMIN — PANTOPRAZOLE SODIUM 40 MG: 40 TABLET, DELAYED RELEASE ORAL at 08:49

## 2021-07-12 RX ADMIN — Medication 250 MG: at 17:35

## 2021-07-12 RX ADMIN — POLYETHYLENE GLYCOL 3350 17 G: 17 POWDER, FOR SOLUTION ORAL at 08:49

## 2021-07-12 RX ADMIN — MELATONIN TAB 3 MG 3 MG: 3 TAB at 21:44

## 2021-07-12 NOTE — PROGRESS NOTES
INTERNAL MEDICINE RESIDENCY PROGRESS NOTE     PATIENT INFORMATION     Name: Tino Brenner   Age & Sex: 80 y o  male   MRN: [id number]  Hospital Stay Days: 16  Unit/Bed#: [facility] 320-01   Encounter: [id number]  Team: SOD Team B     ASSESSMENT/PLAN     Principal Problem:    Right second toe ulcer ([facility] )  Active Problems:    Dementia without behavioral disturbance ([place] [address] )    History of coronary artery disease    Peripheral vascular disease in diabetes mellitus ([place] [address] )    BPH (benign prostatic hyperplasia)    Type 2 diabetes mellitus, with long-term current use of insulin ([facility])    Diastolic heart failure ([place] [address] )    Hypertension    Anemia    Hearing difficulty of both ears    Actinic dermatitis    Constipation    Constipation  Assessment & Plan  Unknown last bowel movement on admission, most recent bowel movement 7/8  · Miralax 17 g daily   · Senokot daily  · Dulcolax 10 mg suppository PRN      Actinic dermatitis  Assessment & Plan  Attending admitted noted on admission including scald and different areas mainly bilateral groin and right lower extremity  · Clotrimazole cream  · Wound care consult    Hearing difficulty of both ears  Assessment & Plan  Hearing difficulty noted on admission most likely presbycusis         Anemia  Assessment & Plan  History of anemia, hemoglobin baseline 11-13, hemoglobin stable  · Continue home ferrous sulfate 325 ; will give every other day instead of daily while inpatient  · Bowel regimen daily  · May need MMA, folate, iron panel as outpatient  · Continue to monitor daily CBC      Hypertension  Assessment & Plan  History of hypertension, blood pressure well controlled on admission 130s over 60s  · Continue home Lopressor 25 b i d   · Continue Bumex 0 5 mg daily - diastolic heart failure  · Monitor vitals    Diastolic heart failure (HCC)  Assessment & Plan  Wt Readings from Last 3 Encounters:   07/12/21 65 3 kg (143 lb 15 4 oz)   03/10/20 77 1 kg (170 lb)   09/10/19 77 1 kg (170 lb)     History of chronic diastolic heart failure, right lower extremity grade 1 edema on admission, denies shortness of breath, CTAB on admission  Plan  · Continue home Lopressor 25 b i d   · Continue home Bumex 0 5 mg q d  · Daily in/out  · Cardiac diet, Na <2G and <2L fluid     Type 2 diabetes mellitus, with long-term current use of insulin [facility])  Assessment & Plan  Lab Results   Component Value Date    HGBA1C 6 9 (H) 06/26/2021       Recent Labs     07/11/21  1642 07/11/21  2057 07/12/21  0723 07/12/21  1104   POCGLU 162* 192* 187* 198*       Blood Sugar Average: Last 72 hrs:  (P) 091 [id number]   Relatively controlled H A1c 6 9  Home medications include Lantus 12 units at noon, and metformin 500  · Hold metformin while inpatient  · Decreased Lantus to 6 units in setting of decreased oral intake and sugars in 's  · Monitor glucose and adjust as needed    BPH (benign prostatic hyperplasia)  Assessment & Plan  Known history of large prostate, came with suprapubic Hays in place  · Continue home Flomax 0 4 mg daily  · Consider holding Myrbetriq given the suprapubic Hays in place    Peripheral vascular disease in diabetes mellitus [facility])  Assessment & Plan  S/p L BKA  6/27 Lower limb arterial duplex - Diffuse disease noted throughout the femoropopliteal arteries without  significant focal stenosis  Evidence suggestive of Tibioperoneal occlusive disease  Ankle/Brachial index: Non-compressible  Metatarsal pressure and Great toe pressure unobtainable secondary to attenuated PPG waveform  PVR/ PPG tracings are dampened    · Continue ASA 81 mg daily  · Continue Plavix 75 mg daily  · See 2nd right toe ulcer assessment and plan    History of coronary artery disease  Assessment & Plan  History of coronary artery disease, denies any chest pain or palpitation on admission    Plan  · Continue aspirin 81 mg daily  · Continue Plavix 7 mg daily  · Continue atorvastatin 10 mg daily    Dementia without behavioral disturbance [facility])  Assessment & Plan  History of dementia without behavioral disturbance, old history of stroke, AO x1 to self, baseline and stable  His niece, Antony Taveras, is POA  Contact at [phone]     * Right second toe ulcer [facility])  Assessment & Plan  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 confirmed by foot x-ray  Leukocytosis on admission 11 5, now WNL  CRP of 18 4, ESR 59 on admission  Patient is afebrile, no tachycardia no tachypnea, blood pressure WNL   Arteriogram - "Successful angioplasty of the tibioperoneal trunk and anterior tibial artery due to severe stenoses  3 vessel run-off now present "    · Podiatry following, appreciate recommendations  Patient and POA Shiela Key) agreeable to plan for amputation of right 2nd toe on     · ID consulted  · Culture of toe ulcer taken  -> 4+ pseudomonas, 2+ MRSA  · Vancomycin and Cefepime dc'd today () following right 2nd toe amputation   · Daily CBC  · Monitor vitals  · Wound care  · P r n  Tylenol  · Plan for first complete post-surgical dressing change tomorrow   · PT/OT consulted          Disposition: Inpatient     SUBJECTIVE     Patient seen and examined  No acute events overnight  Patient underwent right 2nd toe amputation yesterday  Overall he is feeling well and has no acute complaints  OBJECTIVE     Vitals:    21 1509 21 0027 21 0600 21 0700   BP: 117/66 111/57  123/67   BP Location: Left arm Left arm  Left arm   Pulse: (!) 109 99  (!) 115   Resp: 16 18  18   Temp: 98 °F (36 7 °C) (!) 97 4 °F (36 3 °C)  98 1 °F (36 7 °C)   TempSrc: Oral Oral  Oral   SpO2: 100% 99%  97%   Weight:   65 3 kg (143 lb 15 4 oz)    Height:          Temperature:   Temp (24hrs), Av 9 °F (36 6 °C), Min:97 4 °F (36 3 °C), Max:98 1 °F (36 7 °C)    Temperature: 98 1 °F (36 7 °C)  Intake & Output:  I/O       07/10 0701 -  0700 07/    P  O  355  225    I V  (mL/kg)  335 (5 1)     IV Piggyback   50    Total Intake(mL/kg) 355 (5 5) 335 (5 1) 275 (4 2)    Urine (mL/kg/hr) 1450 (0 9) 700 (0 4) 400 (1 2)    Stool   0    Total Output 1450 700 400    Net -1095 -365 -125           Unmeasured Stool Occurrence   1 x          Intake/Output Summary (Last 24 hours) at 7/12/2021 1229  Last data filed at 7/12/2021 1000  Gross per 24 hour   Intake 610 ml   Output 950 ml   Net -340 ml     I/O this shift:  In: 275 [P O :225; IV Piggyback:50]  Out: 400 [Urine:400]  Weights:   IBW (Ideal Body Weight): 61 5 kg    Body mass index is 23 96 kg/m²  Weight (last 2 days)     Date/Time   Weight    07/12/21 0600   65 3 (143 96)    07/11/21 0600   64 2 (141 54)            Physical Exam  Constitutional:       Appearance: He is well-developed  HENT:      Head: Normocephalic and atraumatic  Nose: Nose normal    Eyes:      General:         Right eye: No discharge  Left eye: No discharge  Conjunctiva/sclera: Conjunctivae normal       Pupils: Pupils are equal, round, and reactive to light  Neck:      Thyroid: No thyromegaly  Cardiovascular:      Rate and Rhythm: Normal rate and regular rhythm  Heart sounds: Normal heart sounds  No murmur heard  No friction rub  No gallop  Pulmonary:      Effort: Pulmonary effort is normal  No respiratory distress  Breath sounds: Normal breath sounds  No stridor  No wheezing or rales  Chest:      Chest wall: No tenderness  Abdominal:      General: Bowel sounds are normal  There is no distension  Palpations: Abdomen is soft  There is no mass  Tenderness: There is no abdominal tenderness  There is no guarding or rebound  Musculoskeletal:      Comments: Left BKA  Right foot wrapped  Lymphadenopathy:      Cervical: No cervical adenopathy  Skin:     General: Skin is warm and dry  Neurological:      Mental Status: He is alert     Psychiatric:         Mood and Affect: Mood normal          Behavior: Behavior normal          Thought Content: Thought content normal          Judgment: Judgment normal         LABORATORY DATA     Labs: I have personally reviewed pertinent reports  Results from last 7 days   Lab Units 07/12/21  0519 07/11/21  1237 07/11/21  0606 07/10/21  1024 07/09/21  0407   WBC Thousand/uL 12 21*  --  9 70 11 53* 9 56   HEMOGLOBIN g/dL 10 4*  --  9 7* 10 9* 10 7*   HEMATOCRIT % 32 6*  --  30 2* 34 3* 33 0*   PLATELETS Thousands/uL 319 311 318 390 317   NEUTROS PCT %  --   --  68 78* 87*   MONOS PCT %  --   --  7 6 3*      Results from last 7 days   Lab Units 07/12/21  0519 07/11/21  0607 07/10/21  1024 07/08/21  1706   POTASSIUM mmol/L 4 3 4 1 4 1  --    CHLORIDE mmol/L 107 111* 107  --    CO2 mmol/L 26 25 23  --    CO2, I-STAT mmol/L  --   --   --  25   BUN mg/dL 12 14 16  --    CREATININE mg/dL 0 78 0 82 1 06  --    CALCIUM mg/dL 9 1 8 9 8 8  --    ALK PHOS U/L 121*  --   --   --    ALT U/L 13  --   --   --    AST U/L 14  --   --   --    GLUCOSE, ISTAT mg/dl  --   --   --  204*     Serum creatinine: 0 78 mg/dL 07/12/21 0519  Estimated creatinine clearance: 52 6 mL/min   Results from last 7 days   Lab Units 07/12/21  0519   MAGNESIUM mg/dL 2 3          Results from last 7 days   Lab Units 07/08/21  0502   INR  1 24*               IMAGING & DIAGNOSTIC TESTING     Radiology Results: I have personally reviewed pertinent reports  XR foot 3+ views RIGHT    Result Date: 6/27/2021  Impression: 2nd mid phalanx, 2nd distal phalanx osteomyelitis suspected  The study was marked in [facility] for immediate notification  Workstation performed: [id number]     Other Diagnostic Testing: I have personally reviewed pertinent reports        ACTIVE MEDICATIONS     Current Facility-Administered Medications   Medication Dose Route Frequency    acetaminophen (TYLENOL) tablet 488 mg  488 mg Oral Q6H PRN    aspirin (ECOTRIN LOW STRENGTH) EC tablet 81 mg  81 mg Oral Daily    atorvastatin (LIPITOR) tablet 40 mg  40 mg Oral Daily With Dinner    bisacodyl (DULCOLAX) rectal suppository 10 mg  10 mg Rectal Daily PRN    bumetanide (BUMEX) tablet 0 5 mg  0 5 mg Oral Daily    cholecalciferol (VITAMIN D3) tablet 1,000 Units  1,000 Units Oral Daily    clopidogrel (PLAVIX) tablet 75 mg  75 mg Oral Daily    clotrimazole (LOTRIMIN) 1 % cream   Topical BID    enoxaparin (LOVENOX) subcutaneous injection 40 mg  40 mg Subcutaneous Daily    ferrous sulfate tablet 325 mg  325 mg Oral Daily With Breakfast    fluticasone (FLONASE) 50 mcg/act nasal spray 1 spray  1 spray Each Nare Daily    insulin glargine (LANTUS) subcutaneous injection 6 Units 0 06 mL  6 Units Subcutaneous Daily    insulin lispro (HumaLOG) 100 units/mL subcutaneous injection 1-5 Units  1-5 Units Subcutaneous 4x Daily (AC & HS)    melatonin tablet 3 mg  3 mg Oral HS    metoprolol tartrate (LOPRESSOR) tablet 25 mg  25 mg Oral Q12H AGGIE    pantoprazole (PROTONIX) EC tablet 40 mg  40 mg Oral BID    polyethylene glycol (MIRALAX) packet 17 g  17 g Oral Daily    saccharomyces boulardii (FLORASTOR) capsule 250 mg  250 mg Oral BID    senna-docusate sodium (SENOKOT S) 8 6-50 mg per tablet 2 tablet  2 tablet Oral HS    sodium chloride 0 9 % infusion  75 mL/hr Intravenous Continuous    sodium chloride 0 9 % infusion  75 mL/hr Intravenous Continuous    tamsulosin (FLOMAX) capsule 0 4 mg  0 4 mg Oral Daily With Dinner     VTE Pharmacologic Prophylaxis: Enoxaparin (Lovenox)  VTE Mechanical Prophylaxis: sequential compression device    ==  Sergo Friend MD  IM Resident, PGY-2  [facility]

## 2021-07-12 NOTE — PROGRESS NOTES
Podiatry - Progress Note  Patient: Estrella Paz 80 y o  male   MRN: [id number]  PCP: Radha Hugo MD  Unit/Bed#: [facility] 320-01 Encounter: [id number]  Date Of Visit: 21    ASSESSMENT:    Estrella Paz is a 80 y o  male with:    1  R 2nd digit ulceration probing to bone - [place] 3 - POA  - s/p 2nd digit amp (21)  2  T2DM  3  PAD  - S/p angiogram () with successful angioplasty RLE pop and AT and 3 vessel runoff    4  Dementia      PLAN:    · Post-surgical dressing located on surgical site and affected extremity were left intact today  · Will plan for first complete post-surgical dressing change tomorrow  · Pain is well controlled  Continue current pain management regimen  · Elevation on green foam wedges or pillows when non-ambulatory  · Rest of care per primary team     Weightbearing status: RLE partial to heel in surgical shoe       SUBJECTIVE:     The patient was seen, evaluated, and assessed at bedside today  The patient was awake, alert, and in no acute distress  The patient reports no pain at this time  Tolerating PO fluids  Patient denies N/V/F/chills/SOB/CP  OBJECTIVE:     Vitals:   /67 (BP Location: Left arm)   Pulse (!) 115   Temp 98 1 °F (36 7 °C) (Oral)   Resp 18   Ht 5' 5" (1 651 m)   Wt 65 3 kg (143 lb 15 4 oz)   SpO2 97%   BMI 23 96 kg/m²     Temp (24hrs), Av 9 °F (36 6 °C), Min:97 4 °F (36 3 °C), Max:98 1 °F (36 7 °C)      Physical Exam:     General:  Alert, cooperative, and in no distress  Lower extremity exam:  Cardiovascular status at baseline  Neurological status at baseline  Musculoskeletal status at baseline  No erythema, edema, or lymphangitis noted from dressing  Lower extremity temperature WNL       Additional Data:     Labs:    Results from last 7 days   Lab Units 21  0519 21  0606   WBC Thousand/uL 12 21* 9 70   HEMOGLOBIN g/dL 10 4* 9 7*   HEMATOCRIT % 32 6* 30 2*   PLATELETS Thousands/uL 319 318   NEUTROS PCT %  --  68   LYMPHS PCT %  -- 21   MONOS PCT %  --  7   EOS PCT %  --  2     Results from last 7 days   Lab Units 07/12/21  0519 07/08/21  1706   POTASSIUM mmol/L 4 3  --    CHLORIDE mmol/L 107  --    CO2 mmol/L 26  --    CO2, I-STAT mmol/L  --  25   BUN mg/dL 12  --    CREATININE mg/dL 0 78  --    CALCIUM mg/dL 9 1  --    ALK PHOS U/L 121*  --    ALT U/L 13  --    AST U/L 14  --    GLUCOSE, ISTAT mg/dl  --  204*     Results from last 7 days   Lab Units 07/08/21  0502   INR  1 24*       * I Have Reviewed All Lab Data Listed Above  Recent Cultures (last 7 days):               Imaging: I have personally reviewed pertinent films in PACS  EKG, Pathology, and Other Studies: I have personally reviewed pertinent reports  ** Please Note: Portions of the record may have been created with voice recognition software  Occasional wrong word or "sound a like" substitutions may have occurred due to the inherent limitations of voice recognition software  Read the chart carefully and recognize, using context, where substitutions have occurred   **

## 2021-07-12 NOTE — ASSESSMENT & PLAN NOTE
Wt Readings from Last 3 Encounters:   07/13/21 68 5 kg (151 lb 0 2 oz)   03/10/20 77 1 kg (170 lb)   09/10/19 77 1 kg (170 lb)     History of chronic diastolic heart failure, right lower extremity grade 1 edema on admission, denies shortness of breath, CTAB on admission  Plan  · Continue home Lopressor 25 b i d   · Continue home Bumex 0 5 mg q d    · Daily in/out  · Cardiac diet, Na <2G and <2L fluid

## 2021-07-12 NOTE — ASSESSMENT & PLAN NOTE
Lab Results   Component Value Date    HGBA1C 6 9 (H) 06/26/2021       Recent Labs     07/12/21  1610 07/12/21  2110 07/13/21  0644 07/13/21  1105   POCGLU 162* 200* 135 208*       Blood Sugar Average: Last 72 hrs:  (P) 774 [id number]   Relatively controlled H A1c 6 9  Home medications include Lantus 12 units at noon, and metformin 500  · Hold metformin while inpatient  · Decreased Lantus to 6 units in setting of decreased oral intake and sugars in 's    · Monitor glucose and adjust as needed

## 2021-07-12 NOTE — PROGRESS NOTES
Progress Note - Infectious Disease   Cindy Crockett 80 y o  male MRN: [id number]  Unit/Bed#: [facility] 320-01 Encounter: [id number]      Impression/Recommendations:  1  Right 2nd toe osteomyelitis, confirmed but foot x-ray   Patient is status post bone biopsy, with growth of MRSA and Pseudomonas   Antibiotic regimen will be modified  Flor Fair advanced age, underlying PVD and somewhat poorly controlled DM, probability of toe salvage is essentially nil   Patient has high risk for long-term IV antibiotic toxicity and very low probability of cure  He is status post toe amputation , for surgical cure  Discontinue vancomycin/cefepime  Wound care per Podiatry      2  Right 2nd toe ulcer, likely multifactorial, with DM and PVD contributing   I doubt the ulcer will heal with local care  Patient is status post toe amputation  Local ulcer care per Podiatry      3  DM, poorly controlled, with hyperglycemia and elevated hemoglobin A1c   This plays a big role in poor wound healing and infection   Patient needs better long-term control of blood sugar  Management per primary service      4  PVD   Patient is status post arteriogram with successful PTA  Vascular surgery follow-up      Discussed with patient in detail regarding the above plan      Antibiotics:  Vancomycin/cefepime # 8     Subjective:  Patient is status post right 2nd toe amputation  He is comfortable  Minimal pain in toe or foot  Mental status stable, with mild confusion  Temperature stays down   No chills  He is tolerating antibiotics well   No nausea, vomiting or diarrhea      Objective:  Vitals:  Temp:  [97 4 °F (36 3 °C)-98 1 °F (36 7 °C)] 98 1 °F (36 7 °C)  HR:  [] 115  Resp:  [16-28] 18  BP: (111-123)/(57-77) 123/67  SpO2:  [96 %-100 %] 97 %  Temp (24hrs), Av 9 °F (36 6 °C), Min:97 4 °F (36 3 °C), Max:98 1 °F (36 7 °C)  Current: Temperature: 98 1 °F (36 7 °C)    Physical Exam:     General: Awake, alert, cooperative, no distress  Neck:  Supple  No mass  No lymphadenopathy  Lungs: Expansion symmetric, no rales, no wheezing, respirations unlabored  Heart:  Regular rate and rhythm, S1 and S2 normal, no murmur  Abdomen: Soft, nondistended, non-tender, bowel sounds active all four quadrants, no masses, no organomegaly  Extremities: No edema  Right foot with dressing in place  Dressing is dry  No erythema/warmth beyond dressing  Minimal tenderness  Skin:  No rash  Neuro: Moves all extremities  Invasive Devices     Peripheral Intravenous Line            Peripheral IV 07/08/21 Distal;Right;Upper;Ventral (anterior) Arm 4 days          Drain            Suprapubic Catheter -- days                Labs studies:   I have personally reviewed pertinent labs  Results from last 7 days   Lab Units 07/12/21  0519 07/11/21  0607 07/10/21  1024 07/08/21  1706   POTASSIUM mmol/L 4 3 4 1 4 1  --    CHLORIDE mmol/L 107 111* 107  --    CO2 mmol/L 26 25 23  --    CO2, I-STAT mmol/L  --   --   --  25   BUN mg/dL 12 14 16  --    CREATININE mg/dL 0 78 0 82 1 06  --    EGFR ml/min/1 73sq m 78 77 61  --    GLUCOSE, ISTAT mg/dl  --   --   --  204*   CALCIUM mg/dL 9 1 8 9 8 8  --    AST U/L 14  --   --   --    ALT U/L 13  --   --   --    ALK PHOS U/L 121*  --   --   --      Results from last 7 days   Lab Units 07/12/21  0519 07/11/21  1237 07/11/21  0606 07/10/21  1024   WBC Thousand/uL 12 21*  --  9 70 11 53*   HEMOGLOBIN g/dL 10 4*  --  9 7* 10 9*   PLATELETS Thousands/uL 319 311 318 390           Imaging Studies:   I have personally reviewed pertinent imaging study reports and images in PACS  EKG, Pathology, and Other Studies:   I have personally reviewed pertinent reports

## 2021-07-12 NOTE — CONSULTS
Vancomycin Pharmacy Consult      Vancomycin has been discontinued; Pharmacy will sign off now  Thank you for involving us in this patient's care  Please do not hesitate to reach back out to Pharmacy  Ginette Mooney, PharmD  Internal Medicine Clinical Pharmacist Specialist  [phone]  [facility]/Te[facility]

## 2021-07-12 NOTE — PLAN OF CARE
Problem: MOBILITY - ADULT  Goal: Maintain or return to baseline ADL function  Description: INTERVENTIONS:  -  Assess patient's ability to carry out ADLs; assess patient's baseline for ADL function and identify physical deficits which impact ability to perform ADLs (bathing, care of mouth/teeth, toileting, grooming, dressing, etc )  - Assess/evaluate cause of self-care deficits   - Assess range of motion  - Assess patient's mobility; develop plan if impaired  - Assess patient's need for assistive devices and provide as appropriate  - Encourage maximum independence but intervene and supervise when necessary  - Involve family in performance of ADLs  - Assess for home care needs following discharge   - Consider OT consult to assist with ADL evaluation and planning for discharge  - Provide patient education as appropriate  Outcome: Progressing  Goal: Maintains/Returns to pre admission functional level  Description: INTERVENTIONS:  - Perform BMAT or MOVE assessment daily    - Set and communicate daily mobility goal to care team and patient/family/caregiver     - Collaborate with rehabilitation services on mobility goals if consulted  - Out of bed for toileting  - Record patient progress and toleration of activity level   Outcome: Progressing     Problem: SKIN/TISSUE INTEGRITY - ADULT  Goal: Incision(s), wounds(s) or drain site(s) healing without S/S of infection  Description: INTERVENTIONS  - Assess and document dressing, incision, wound bed, drain sites and surrounding tissue  - Provide patient and family education  Outcome: Progressing  Goal: Pressure injury heals and does not worsen  Description: Interventions:  - Implement low air loss mattress or specialty surface (Criteria met)  - Apply silicone foam dressing   - Apply fecal or urinary incontinence containment device   - Utilize friction reducing device or surface for transfers   - Consider nutrition services referral as needed  Outcome: Progressing     Problem: MUSCULOSKELETAL - ADULT  Goal: Maintain or return mobility to safest level of function  Description: INTERVENTIONS:  - Assess patient's ability to carry out ADLs; assess patient's baseline for ADL function and identify physical deficits which impact ability to perform ADLs (bathing, care of mouth/teeth, toileting, grooming, dressing, etc )  - Assess/evaluate cause of self-care deficits   - Assess range of motion  - Assess patient's mobility  - Assess patient's need for assistive devices and provide as appropriate  - Encourage maximum independence but intervene and supervise when necessary  - Involve family in performance of ADLs  - Assess for home care needs following discharge   - Consider OT consult to assist with ADL evaluation and planning for discharge  - Provide patient education as appropriate  Outcome: Progressing     Problem: Prexisting or High Potential for Compromised Skin Integrity  Goal: Skin integrity is maintained or improved  Description: INTERVENTIONS:  - Identify patients at risk for skin breakdown  - Assess and monitor skin integrity  - Assess and monitor nutrition and hydration status  - Monitor labs   - Assess for incontinence   - Turn and reposition patient  - Assist with mobility/ambulation  - Relieve pressure over bony prominences  - Avoid friction and shearing  - Provide appropriate hygiene as needed including keeping skin clean and dry  - Evaluate need for skin moisturizer/barrier cream  - Collaborate with interdisciplinary team   - Patient/family teaching  - Consider wound care consult   Outcome: Progressing     Problem: Potential for Falls  Goal: Patient will remain free of falls  Description: INTERVENTIONS:  - Educate patient/family on patient safety including physical limitations  - Instruct patient to call for assistance with activity   - Consult OT/PT to assist with strengthening/mobility   - Keep Call bell within reach  - Keep bed low and locked with side rails adjusted as appropriate  - Keep care items and personal belongings within reach  - Initiate and maintain comfort rounds  - Make Fall Risk Sign visible to staff  - Apply yellow socks and bracelet for high fall risk patients  - Consider moving patient to room near nurses station  Outcome: Progressing     Problem: Nutrition/Hydration-ADULT  Goal: Nutrient/Hydration intake appropriate for improving, restoring or maintaining nutritional needs  Description: Monitor and assess patient's nutrition/hydration status for malnutrition  Collaborate with interdisciplinary team and initiate plan and interventions as ordered  Monitor patient's weight and dietary intake as ordered or per policy  Utilize nutrition screening tool and intervene as necessary  Determine patient's food preferences and provide high-protein, high-caloric foods as appropriate       INTERVENTIONS:  - Monitor oral intake, urinary output, labs, and treatment plans  - Assess nutrition and hydration status and recommend course of action  - Evaluate amount of meals eaten  - Assist patient with eating if necessary   - Allow adequate time for meals  - Recommend/ encourage appropriate diets, oral nutritional supplements, and vitamin/mineral supplements  - Order, calculate, and assess calorie counts as needed  - Recommend, monitor, and adjust tube feedings and TPN/PPN based on assessed needs  - Assess need for intravenous fluids  - Provide specific nutrition/hydration education as appropriate  - Include patient/family/caregiver in decisions related to nutrition  Outcome: Progressing     Problem: PAIN - ADULT  Goal: Verbalizes/displays adequate comfort level or baseline comfort level  Description: Interventions:  - Encourage patient to monitor pain and request assistance  - Assess pain using appropriate pain scale  - Administer analgesics based on type and severity of pain and evaluate response  - Implement non-pharmacological measures as appropriate and evaluate response  - Consider cultural and social influences on pain and pain management  - Notify physician/advanced practitioner if interventions unsuccessful or patient reports new pain  Outcome: Progressing     Problem: INFECTION - ADULT  Goal: Absence or prevention of progression during hospitalization  Description: INTERVENTIONS:  - Assess and monitor for signs and symptoms of infection  - Monitor lab/diagnostic results  - Monitor all insertion sites, i e  indwelling lines, tubes, and drains  - Monitor endotracheal if appropriate and nasal secretions for changes in amount and color  - [place] appropriate cooling/warming therapies per order  - Administer medications as ordered  - Instruct and encourage patient and family to use good hand hygiene technique  - Identify and instruct in appropriate isolation precautions for identified infection/condition  Outcome: Progressing     Problem: SAFETY ADULT  Goal: Maintain or return to baseline ADL function  Description: INTERVENTIONS:  -  Assess patient's ability to carry out ADLs; assess patient's baseline for ADL function and identify physical deficits which impact ability to perform ADLs (bathing, care of mouth/teeth, toileting, grooming, dressing, etc )  - Assess/evaluate cause of self-care deficits   - Assess range of motion  - Assess patient's mobility; develop plan if impaired  - Assess patient's need for assistive devices and provide as appropriate  - Encourage maximum independence but intervene and supervise when necessary  - Involve family in performance of ADLs  - Assess for home care needs following discharge   - Consider OT consult to assist with ADL evaluation and planning for discharge  - Provide patient education as appropriate  Outcome: Progressing     Problem: DISCHARGE PLANNING  Goal: Discharge to home or other facility with appropriate resources  Description: INTERVENTIONS:  - Identify barriers to discharge w/patient and caregiver  - Arrange for needed discharge resources and transportation as appropriate  - Identify discharge learning needs (meds, wound care, etc )  - Arrange for interpretive services to assist at discharge as needed  - Refer to Case Management Department for coordinating discharge planning if the patient needs post-hospital services based on physician/advanced practitioner order or complex needs related to functional status, cognitive ability, or social support system  Outcome: Progressing     Problem: Knowledge Deficit  Goal: Patient/family/caregiver demonstrates understanding of disease process, treatment plan, medications, and discharge instructions  Description: Complete learning assessment and assess knowledge base    Interventions:  - Provide teaching at level of understanding  - Provide teaching via preferred learning methods  Outcome: Progressing

## 2021-07-12 NOTE — ASSESSMENT & PLAN NOTE
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 confirmed by foot x-ray  Leukocytosis on admission 11 5, now WNL  CRP of 18 4, ESR 59 on admission  Patient is afebrile, no tachycardia no tachypnea, blood pressure WNL  7/8 Arteriogram - "Successful angioplasty of the tibioperoneal trunk and anterior tibial artery due to severe stenoses  3 vessel run-off now present "    · Podiatry following, appreciate recommendations  Patient and SOLIS Gonzalez) agreeable to plan for amputation of right 2nd toe on 7/11    · ID consulted  · Culture of toe ulcer taken 7/2 -> 4+ pseudomonas, 2+ MRSA  · Vancomycin and Cefepime dc'd (7/12) following right 2nd toe amputation 7/11  · Daily CBC  · Monitor vitals  · Wound care  · P r n   Tylenol  · Post-surgical dressing change 7/13  · PT/OT following  · Patient is medically stable for discharge to SNF

## 2021-07-12 NOTE — ASSESSMENT & PLAN NOTE
History of dementia without behavioral disturbance, old history of stroke, AO x1 to self, baseline and stable  His niece, Ashley Wood, is POA   Contact at [phone]

## 2021-07-12 NOTE — OCCUPATIONAL THERAPY NOTE
OCCUPATIONAL THERAPY SCREEN     ORDERS RECEIVED  CHART REVIEW COMPLETED  PER CM NOTE FROM 6/28, PT FROM [facility] WHERE PT IS "MAX ASSIT WITH AMBULATION AND ADLS" WITH PLAN TO RETURN  PT PARTICIPATED IN INTIAL OT EVAL ON SAME DATE WHERE PT WAS DEEMED TO BE FUNCTIONING SIMILAR TO BASELINE  NO ACUTE CARE OT NEEDS  PLAN TO RETURN TO [facility] WITH APPROPRIATE LEVEL CARE WHEN MEDICALLY CLEARED  D/C OT   THANK YOU       Vinny oRd, PIPER, OTR/L

## 2021-07-12 NOTE — QUICK NOTE
Progress Note - Palliative & Supportive Care     Patient with good pain control and goals are aligned  [facility] will offer outpatient follow up and sign off at this time  Please notify on-call provider with any further questions/concerns      Wendy Ruth, DO  Palliative and Supportive Care  [phone]

## 2021-07-13 VITALS
WEIGHT: 151.01 LBS | DIASTOLIC BLOOD PRESSURE: 87 MMHG | HEIGHT: 65 IN | OXYGEN SATURATION: 97 % | RESPIRATION RATE: 46 BRPM | HEART RATE: 109 BPM | SYSTOLIC BLOOD PRESSURE: 111 MMHG | BODY MASS INDEX: 25.16 KG/M2 | TEMPERATURE: 98.4 F

## 2021-07-13 LAB
ANION GAP SERPL CALCULATED.3IONS-SCNC: 7 MMOL/L (ref 4–13)
BASOPHILS # BLD AUTO: 0.03 THOUSANDS/ΜL (ref 0–0.1)
BASOPHILS NFR BLD AUTO: 0 % (ref 0–1)
BUN SERPL-MCNC: 13 MG/DL (ref 5–25)
CALCIUM SERPL-MCNC: 8.7 MG/DL (ref 8.3–10.1)
CHLORIDE SERPL-SCNC: 105 MMOL/L (ref 100–108)
CO2 SERPL-SCNC: 26 MMOL/L (ref 21–32)
CREAT SERPL-MCNC: 0.82 MG/DL (ref 0.6–1.3)
EOSINOPHIL # BLD AUTO: 0.22 THOUSAND/ΜL (ref 0–0.61)
EOSINOPHIL NFR BLD AUTO: 2 % (ref 0–6)
ERYTHROCYTE [DISTWIDTH] IN BLOOD BY AUTOMATED COUNT: 13.8 % (ref 11.6–15.1)
GFR SERPL CREATININE-BSD FRML MDRD: 77 ML/MIN/1.73SQ M
GLUCOSE SERPL-MCNC: 110 MG/DL (ref 65–140)
GLUCOSE SERPL-MCNC: 135 MG/DL (ref 65–140)
GLUCOSE SERPL-MCNC: 208 MG/DL (ref 65–140)
HCT VFR BLD AUTO: 30.9 % (ref 36.5–49.3)
HGB BLD-MCNC: 9.8 G/DL (ref 12–17)
IMM GRANULOCYTES # BLD AUTO: 0.09 THOUSAND/UL (ref 0–0.2)
IMM GRANULOCYTES NFR BLD AUTO: 1 % (ref 0–2)
LYMPHOCYTES # BLD AUTO: 1.97 THOUSANDS/ΜL (ref 0.6–4.47)
LYMPHOCYTES NFR BLD AUTO: 18 % (ref 14–44)
MCH RBC QN AUTO: 30.7 PG (ref 26.8–34.3)
MCHC RBC AUTO-ENTMCNC: 31.7 G/DL (ref 31.4–37.4)
MCV RBC AUTO: 97 FL (ref 82–98)
MONOCYTES # BLD AUTO: 0.65 THOUSAND/ΜL (ref 0.17–1.22)
MONOCYTES NFR BLD AUTO: 6 % (ref 4–12)
NEUTROPHILS # BLD AUTO: 7.96 THOUSANDS/ΜL (ref 1.85–7.62)
NEUTS SEG NFR BLD AUTO: 73 % (ref 43–75)
NRBC BLD AUTO-RTO: 0 /100 WBCS
PLATELET # BLD AUTO: 312 THOUSANDS/UL (ref 149–390)
PMV BLD AUTO: 10.3 FL (ref 8.9–12.7)
POTASSIUM SERPL-SCNC: 3.8 MMOL/L (ref 3.5–5.3)
RBC # BLD AUTO: 3.19 MILLION/UL (ref 3.88–5.62)
SARS-COV-2 RNA RESP QL NAA+PROBE: NEGATIVE
SODIUM SERPL-SCNC: 138 MMOL/L (ref 136–145)
WBC # BLD AUTO: 10.92 THOUSAND/UL (ref 4.31–10.16)

## 2021-07-13 PROCEDURE — U0003 INFECTIOUS AGENT DETECTION BY NUCLEIC ACID (DNA OR RNA); SEVERE ACUTE RESPIRATORY SYNDROME CORONAVIRUS 2 (SARS-COV-2) (CORONAVIRUS DISEASE [COVID-19]), AMPLIFIED PROBE TECHNIQUE, MAKING USE OF HIGH THROUGHPUT TECHNOLOGIES AS DESCRIBED BY CMS-2020-01-R: HCPCS | Performed by: STUDENT IN AN ORGANIZED HEALTH CARE EDUCATION/TRAINING PROGRAM

## 2021-07-13 PROCEDURE — U0005 INFEC AGEN DETEC AMPLI PROBE: HCPCS | Performed by: STUDENT IN AN ORGANIZED HEALTH CARE EDUCATION/TRAINING PROGRAM

## 2021-07-13 PROCEDURE — 82948 REAGENT STRIP/BLOOD GLUCOSE: CPT

## 2021-07-13 PROCEDURE — 80048 BASIC METABOLIC PNL TOTAL CA: CPT | Performed by: STUDENT IN AN ORGANIZED HEALTH CARE EDUCATION/TRAINING PROGRAM

## 2021-07-13 PROCEDURE — 99232 SBSQ HOSP IP/OBS MODERATE 35: CPT | Performed by: INTERNAL MEDICINE

## 2021-07-13 PROCEDURE — 92610 EVALUATE SWALLOWING FUNCTION: CPT

## 2021-07-13 PROCEDURE — 99232 SBSQ HOSP IP/OBS MODERATE 35: CPT | Performed by: PODIATRIST

## 2021-07-13 PROCEDURE — 85025 COMPLETE CBC W/AUTO DIFF WBC: CPT | Performed by: STUDENT IN AN ORGANIZED HEALTH CARE EDUCATION/TRAINING PROGRAM

## 2021-07-13 RX ORDER — METOPROLOL TARTRATE 50 MG/1
50 TABLET, FILM COATED ORAL EVERY 12 HOURS SCHEDULED
Status: DISCONTINUED | OUTPATIENT
Start: 2021-07-13 | End: 2021-07-13 | Stop reason: HOSPADM

## 2021-07-13 RX ORDER — METOPROLOL TARTRATE 50 MG/1
50 TABLET, FILM COATED ORAL EVERY 12 HOURS SCHEDULED
Qty: 60 TABLET | Refills: 0
Start: 2021-07-13

## 2021-07-13 RX ORDER — ATORVASTATIN CALCIUM 40 MG/1
40 TABLET, FILM COATED ORAL
Qty: 30 TABLET | Refills: 0
Start: 2021-07-13

## 2021-07-13 RX ADMIN — METOPROLOL TARTRATE 50 MG: 50 TABLET, FILM COATED ORAL at 08:32

## 2021-07-13 RX ADMIN — FERROUS SULFATE TAB 325 MG (65 MG ELEMENTAL FE) 325 MG: 325 (65 FE) TAB at 08:32

## 2021-07-13 RX ADMIN — BUMETANIDE 0.5 MG: 0.5 TABLET ORAL at 08:32

## 2021-07-13 RX ADMIN — CLOPIDOGREL BISULFATE 75 MG: 75 TABLET ORAL at 08:32

## 2021-07-13 RX ADMIN — INSULIN GLARGINE 6 UNITS: 100 INJECTION, SOLUTION SUBCUTANEOUS at 08:33

## 2021-07-13 RX ADMIN — CLOTRIMAZOLE: 0.01 CREAM TOPICAL at 08:35

## 2021-07-13 RX ADMIN — POLYETHYLENE GLYCOL 3350 17 G: 17 POWDER, FOR SOLUTION ORAL at 08:31

## 2021-07-13 RX ADMIN — FLUTICASONE PROPIONATE 1 SPRAY: 50 SPRAY, METERED NASAL at 08:34

## 2021-07-13 RX ADMIN — Medication 250 MG: at 08:32

## 2021-07-13 RX ADMIN — Medication 1000 UNITS: at 08:32

## 2021-07-13 RX ADMIN — PANTOPRAZOLE SODIUM 40 MG: 40 TABLET, DELAYED RELEASE ORAL at 08:32

## 2021-07-13 RX ADMIN — ASPIRIN 81 MG: 81 TABLET, COATED ORAL at 08:32

## 2021-07-13 RX ADMIN — ENOXAPARIN SODIUM 40 MG: 40 INJECTION SUBCUTANEOUS at 08:33

## 2021-07-13 NOTE — PROGRESS NOTES
Progress Note - Infectious Disease   Troy Hdz 80 y o  male MRN: [id number]  Unit/Bed#: [facility] 320-01 Encounter: [id number]      Impression/Recommendations:  1  Right 2nd toe osteomyelitis, confirmed but foot x-ray   Patient is status post bone biopsy, with growth of MRSA and Pseudomonas   Antibiotic regimen will be modified  Mike Howard advanced age, underlying PVD and somewhat poorly controlled DM, probability of toe salvage is essentially nil   Patient has high risk for long-term IV antibiotic toxicity and very low probability of cure   He is status post toe amputation , for surgical cure  He completed empiric antibiotic course  Observe off further antibiotic  Wound care per Podiatry      2  Right 2nd toe ulcer, likely multifactorial, with DM and PVD contributing   I doubt the ulcer will heal with local care  Patient is status post toe amputation  Local ulcer care per Podiatry      3  DM, poorly controlled, with hyperglycemia and elevated hemoglobin A1c   This plays a big role in poor wound healing and infection   Patient needs better long-term control of blood sugar  Management per primary service      4  PVD   Patient is status post arteriogram with successful PTA  Vascular surgery follow-up      Discussed with patient in detail regarding the above plan  Discharge plan per primary and Podiatry service      Antibiotics:  Off antibiotic  POD # 2     Subjective:  Patient is comfortable  No  pain in toe or foot  Mental status stable, with mild confusion  Temperature stays down   No chills  No diarrhea      Objective:  Vitals:  Temp:  [97 9 °F (36 6 °C)-98 4 °F (36 9 °C)] 98 4 °F (36 9 °C)  HR:  [109-118] 109  Resp:  [16-46] 46  BP: (107-129)/(63-87) 111/87  SpO2:  [94 %-99 %] 97 %  Temp (24hrs), Av 2 °F (36 8 °C), Min:97 9 °F (36 6 °C), Max:98 4 °F (36 9 °C)  Current: Temperature: 98 4 °F (36 9 °C)    Physical Exam:     General: Awake, alert, cooperative, no distress  Neck:  Supple  No mass    No lymphadenopathy  Lungs: Expansion symmetric, no rales, no wheezing, respirations unlabored  Heart:  Regular rate and rhythm, S1 and S2 normal, no murmur  Abdomen: Soft, nondistended, non-tender, bowel sounds active all four quadrants, no masses, no organomegaly  Extremities: No edema  Right foot with dressing in place  Dressing is dry  No erythema/warmth  No tenderness  Skin:  No rash  Neuro: Moves all extremities  Invasive Devices     Peripheral Intravenous Line            Peripheral IV 07/08/21 Distal;Right;Upper;Ventral (anterior) Arm 5 days          Drain            Suprapubic Catheter -- days                Labs studies:   I have personally reviewed pertinent labs  Results from last 7 days   Lab Units 07/13/21  0518 07/12/21  0519 07/11/21  0607 07/08/21  1706   POTASSIUM mmol/L 3 8 4 3 4 1  --    CHLORIDE mmol/L 105 107 111*  --    CO2 mmol/L 26 26 25  --    CO2, I-STAT mmol/L  --   --   --  25   BUN mg/dL 13 12 14  --    CREATININE mg/dL 0 82 0 78 0 82  --    EGFR ml/min/1 73sq m 77 78 77  --    GLUCOSE, ISTAT mg/dl  --   --   --  204*   CALCIUM mg/dL 8 7 9 1 8 9  --    AST U/L  --  14  --   --    ALT U/L  --  13  --   --    ALK PHOS U/L  --  121*  --   --      Results from last 7 days   Lab Units 07/13/21  0518 07/12/21  0519 07/11/21  1237 07/11/21  0606   WBC Thousand/uL 10 92* 12 21*  --  9 70   HEMOGLOBIN g/dL 9 8* 10 4*  --  9 7*   PLATELETS Thousands/uL 312 319 311 318           Imaging Studies:   I have personally reviewed pertinent imaging study reports and images in PACS  EKG, Pathology, and Other Studies:   I have personally reviewed pertinent reports

## 2021-07-13 NOTE — CASE MANAGEMENT
Pt is cleared for d/c by [facility] resident Dr Laith Vila  RN Deangelo Perera was notified of pt's d/c order  Pt is accepted for short-term skilled rehab placement at [facility] located in [address] [place] [address] arranged BLS ambulance via [facility] for 5:00PM pickup this day to transport pt to SNF  The pt and his SOLIS Diaz Roots were both informed of d/c  IMM signed by POA  CMN for transport completed by CM  Chart copy for SNF requested from RN  DAVIE to follow

## 2021-07-13 NOTE — TRANSPORTATION MEDICAL NECESSITY
Section I - General Information    Name of Patient: Dakota Kiser                 : 3/31/1929    Medicare #: [id number]  Transport Date: 21 (PCS is valid for round trips on this date and for all repetitive trips in the 60-day range as noted below )  Origin: [facility]                                                         Destination: [address] [facility] / [place]  Is the pt's stay covered under Medicare Part A (PPS/DRG)   [x]     Closest appropriate facility? If no, why is transport to more distant facility required? Yes  If hospice pt, is this transport related to pt's terminal illness? NA       Section II - Medical Necessity Questionnaire  Ambulance transportation is medically necessary only if other means of transport are contraindicated or would be potentially harmful to the patient  To meet this requirement, the patient must either be "bed confined" or suffer from a condition such that transport by means other than ambulance is contraindicated by the patient's condition  The following questions must be answered by the medical professional signing below for this form to be valid:    1)  Describe the MEDICAL CONDITION (physical and/or mental) of this patient AT [address] [place] [address] that requires the patient to be transported in an ambulance and why transport by other means is contraindicated by the patient's condition: PATIENT HAD RECENT TOE AMPUTATION AND IS NON-WEIGHT-BEARING  PATIENT HAS DEMENTIA AND IS CONFUSED AT BASELINE  PATIENT IS A FALL RISK DUE TO COMBINATION OF CONFUSION, NON-WEIGHT-BEARING STATUS, AND IMPULSIVE BEHAVIOR  2) Is the patient "bed confined" as defined below? No  To be "be confined" the patient must satisfy all three of the following conditions: (1) unable to get up from bed without Assistance; AND (2) unable to ambulate; AND (3) unable to sit in a chair or wheelchair      3) Can this patient safely be transported by car or wheelchair van (i e , seated during transport without a medical attendant or monitoring)? No    4) In addition to completing questions 1-3 above, please check any of the following conditions that apply*:   *Note: supporting documentation for any boxes checked must be maintained in the patient's medical records  If hosp-hosp transfer, describe services needed at 2nd facility not available at 1st facility? Patient is confused  Danger to self/others  Medical attendant required   Unable to tolerate seated position for time needed to transport       Section III - Signature of Physician or Healthcare Professional  I certify that the above information is true and correct based on my evaluation of this patient, and represent that the patient requires transport by ambulance and that other forms of transport are contraindicated  I understand that this information will be used by the Centers for Medicare and Medicaid Services (CMS) to support the determination of medical necessity for ambulance services, and I represent that I have personal knowledge of the patient's condition at time of transport  [x]  If this box is checked, I also certify that the patient is physically or mentally incapable of signing the ambulance service's claim and that the institution with which I am affiliated has furnished care, services, or assistance to the patient  My signature below is made on behalf of the patient pursuant to 42 CFR §424 36(b)(4)  In accordance with 42 CFR §424 37, the specific reason(s) that the patient is physically or mentally incapable of signing the claim form is as follows: PATIENT HAS DEMENTIA AND IS CONFUSED AT BASELINE        Signature of Physician* or Healthcare Professional______________________________________________________________  Signature Date 07/13/21 (For scheduled repetitive transports, this form is not valid for transports performed more than 60 days after this date)    Printed Name & Credentials of Physician or Healthcare Professional (MD, DO, RN, etc )________________________________  *Form must be signed by patient's attending physician for scheduled, repetitive transports   For non-repetitive, unscheduled ambulance transports, if unable to obtain the signature of the attending physician, any of the following may sign (choose appropriate option below)  [] Physician Assistant []  Clinical Nurse Specialist []  Registered Nurse  []  Nurse Practitioner  [x] Discharge Planner

## 2021-07-13 NOTE — PHYSICAL THERAPY NOTE
PT orders received  Chart reviewed  Pt had PT evaluation on 6/28 which demonstrated that he was at his baseline for mobility  Will D/C from PT at this time   Please re-consult if medically appropriate  Shiv Blank, PT, DPT     07/13/21 0744   PT Last Visit   PT Visit Date 07/13/21   Note Type   Note type Re-Evaluation   Cancel Reasons Other

## 2021-07-13 NOTE — PROGRESS NOTES
Podiatry - Progress Note  Patient: Shelton Silva 80 y o  male   MRN: [id number]  PCP: Smith Watson MD  Unit/Bed#: [facility] 320-01 Encounter: [id number]  Date Of Visit: 21    ASSESSMENT:    Shelton Silva is a 80 y o  male with:    1  R 2nd digit ulceration probing to bone - [place] 3 - POA  - s/p 2nd digit amp (21)  2  T2DM  3  PAD  - S/p angiogram () with successful angioplasty RLE pop and AT and 3 vessel runoff    4  Dementia    PLAN:    · Incision site assessed to right second toe amp appears well coapted w/o dehiscence with all sutures intact, no SOI  Pt is able for d/c per Podiatry view point  · Elevation on green foam wedges or pillows when non-ambulatory  · Rest of care per primary team      Weightbearing status: RLE partial to heal in surgical shoes    SUBJECTIVE:     The patient was seen, evaluated, and assessed at bedside today  The patient was awake, alert, and in no acute distress  No acute events overnight  The patient reports no pain at this time  Patient denies N/V/F/chills/SOB/CP  OBJECTIVE:     Vitals:   /87 (BP Location: Left arm)   Pulse (!) 109   Temp 98 4 °F (36 9 °C) (Oral)   Resp (!) 46   Ht 5' 5" (1 651 m)   Wt 68 5 kg (151 lb 0 2 oz)   SpO2 97%   BMI 25 13 kg/m²     Temp (24hrs), Av 2 °F (36 8 °C), Min:97 9 °F (36 6 °C), Max:98 4 °F (36 9 °C)      Physical Exam:     General: Alert, cooperative and no distress  Lungs: Non labored breathing  Abdomen: Soft, non-tender  Lower extremity exam:  Cardiovascular status at baseline  Neurological status at baseline  Musculoskeletal status at baseline  No calf tenderness noted  Incision site assessed to right second toe amp site appears well coapted w/o dehiscence with all sutures intact, no SOI  No purulence, no crepitus, no ascending erythema noted  Mild sanguinous drainage       Clinical Images 21:          Additional Data:     Labs:    Results from last 7 days   Lab Units 21  0518   WBC Thousand/uL 10 92*   HEMOGLOBIN g/dL 9 8*   HEMATOCRIT % 30 9*   PLATELETS Thousands/uL 312   NEUTROS PCT % 73   LYMPHS PCT % 18   MONOS PCT % 6   EOS PCT % 2     Results from last 7 days   Lab Units 07/13/21  0518 07/12/21  0519 07/08/21  1706   POTASSIUM mmol/L 3 8 4 3  --    CHLORIDE mmol/L 105 107  --    CO2 mmol/L 26 26  --    CO2, I-STAT mmol/L  --   --  25   BUN mg/dL 13 12  --    CREATININE mg/dL 0 82 0 78  --    CALCIUM mg/dL 8 7 9 1  --    ALK PHOS U/L  --  121*  --    ALT U/L  --  13  --    AST U/L  --  14  --    GLUCOSE, ISTAT mg/dl  --   --  204*     Results from last 7 days   Lab Units 07/08/21  0502   INR  1 24*       * I Have Reviewed All Lab Data Listed Above  Recent Cultures (last 7 days):               Imaging: I have personally reviewed pertinent films in PACS  EKG, Pathology, and Other Studies: I have personally reviewed pertinent reports  ** Please Note: Portions of the record may have been created with voice recognition software  Occasional wrong word or "sound a like" substitutions may have occurred due to the inherent limitations of voice recognition software  Read the chart carefully and recognize, using context, where substitutions have occurred   **

## 2021-07-13 NOTE — SPEECH THERAPY NOTE
Speech Language/Pathology    Speech-Language Pathology Bedside Swallow Evaluation      Patient Name: Cindy Crockett    KJDRR'[id number] Date: 7/13/2021     Problem List  Principal Problem:    Right second toe ulcer ([facility] )  Active Problems:    Dementia without behavioral disturbance ([facility] )    History of coronary artery disease    Peripheral vascular disease in diabetes mellitus (HCC)    BPH (benign prostatic hyperplasia)    Type 2 diabetes mellitus, with long-term current use of insulin (HCC)    Diastolic heart failure (HCC)    Hypertension    Anemia    Hearing difficulty of both ears    Actinic dermatitis    Constipation      Past Medical History  Past Medical History:   Diagnosis Date    Anemia     Dementia ([facility] )     Diabetes mellitus ([facility] )     Diastolic heart failure (HCC)     Hyperlipidemia     Hypertension     Osteoarthritis     PVD (peripheral vascular disease) ([facility] )        Past Surgical History  Past Surgical History:   Procedure Laterality Date    AMPUTATION Left     left bka    IR LOWER EXTREMITY ANGIOGRAM  7/8/2021    TOE AMPUTATION Right 7/11/2021    Procedure: 2nd leonid AMPUTATION;  Surgeon: Catie Chin DPM;  Location: [facility];  Service: Podiatry       Summary   Pt presented with s/s suggestive of mild-moderate oral and suspected at least moderate pharyngeal dysphagia  Symptoms or concerns included prolonged and reduced mastication and prolonged bolus formation for materials presented today - pt refused advanced solids at this time  Swallow initiation suspected delayed, hyo-laryngeal movement reduced to palpation  Cough noted w/ puree x1 while taking meds (subsequent trials without overt s/s), soft solids, nectar thick liquids, and thin liquids  Small tsps of puree and honey thick liquids best tolerated today  Pt may benefit from VBS if s/s continue to further assess swallow function       Risk/s for Aspiration: Mod-severe      Recommended Diet: puree/level 1 diet, honey thick liquids and liquids by tsp only    Recommended Form of Meds: crushed with puree and slow rate for tsps    Aspiration precautions and swallowing strategies: upright posture, only feed when fully alert, slow rate of feeding, small bites/sips and liquids by teaspoon only  Other Recommendations: Continue frequent oral care, full feed, ST f/u as able and appropriate         Current Medical Status  Pt is a 80 y o  male who presented to [facility] with 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 confirmed by foot x-ray  Leukocytosis on admission 11 5, now WNL  CRP of 18 4, ESR 59 on admission  Patient is afebrile, no tachycardia no tachypnea, blood pressure WNL  7/8 Arteriogram - "Successful angioplasty of the tibioperoneal trunk and anterior tibial artery due to severe stenoses   3 vessel run-off now present "     · Podiatry following, appreciate recommendations  Patient and POA Maggy Lowers) agreeable to plan for amputation of right 2nd toe on 7/11     · ID consulted  ? Culture of toe ulcer taken 7/2 -> 4+ pseudomonas, 2+ MRSA  Vancomycin and Cefepime dc'd today (7/12) following right 2nd toe amputation 7/11    Current Precautions:  Contact     Allergies:  No known food allergies    Past medical history:  Please see H&P for details    Special Studies:      Social/Education/Vocational Hx:  Pt lives in SNF/ECF    Swallow Information   Current Risks for Dysphagia & Aspiration: recent surgery  Current Symptoms/Concerns: coughing with po and choking incident  Current Diet: regular diet and thin liquids   Baseline Diet: Assumed regular w/ thin, pt is poor historian, diet order not in facility documents on record, attempted to call facility w/ no answer      Baseline Assessment   Behavior/Cognition: alert  Speech/Language Status: able to participate in basic conversation and able to follow commands  Patient Positioning: upright in bed  Pain Status/Interventions/Response to Interventions:  No report of or nonverbal indications of pain  Swallow Mechanism Exam  Facial: symmetrical  Labial: WFL  Lingual: WFL  Velum: symmetrical  Mandible: adequate ROM  Dentition: adequate  Vocal quality:clear/adequate   Volitional Cough: weak   Respiratory Status: on RA       Consistencies Assessed and Performance   Consistencies Administered: thin liquids, nectar thick, honey thick, puree, soft solids and pt refused hard solids   Materials administered included applesauce, denins cracker     Oral Stage: mild-moderate  Retrieval from tsp weak  Mastication was prolonged and reduced with the materials administered today  Bolus formation prolonged, transfers were fair, mild oral residue noted  No overt s/s reduced oral control  Pharyngeal Stage: suspected at least moderate  Swallow Mechanics:  Swallowing initiation suspected delay  Laryngeal rise was palpated and judged to be reduced  Multiple swallows noted per bolus, suspect pharyngeal retention  Cough w/ puree x1, dennis cracker, nectar thick liquids, and thin liquids  Esophageal Concerns: none reported    Strategies and Efficacy: Pt refused attempted strategies at this time     Summary and Recommendations (see above)    Results Reviewed with: patient, RN and physician      Treatment Recommended: Yes     Frequency of treatment: As able     Patient Stated Goal: None stated     Dysphagia LTG  -Patient will demonstrate safe and effective oral intake (without overt s/s significant oral/pharyngeal dysphagia including s/s penetration or aspiration) for the highest appropriate diet level       Short Term Goals:  -Pt will tolerate Dysphagia 1/pureed diet and honey thick with no significant s/s oral or pharyngeal dysphagia across 1-3 diagnostic session/s    -Patient will tolerate trials of upgraded food and/or liquid texture with no significant s/s of oral or pharyngeal dysphagia including aspiration across 1-3 diagnostic sessions     -Patient will comply with a Video/Modified Barium Swallow study for more complete assessment of swallowing anatomy/physiology/aspiration risk and to assess efficacy of treatment techniques so as to best guide treatment plan        Speech Therapy Prognosis   Prognosis: fair    Prognosis Considerations: age, medical status and cognitive status

## 2021-07-13 NOTE — DISCHARGE INSTR - DIET
Continue diet as recommended by SLP, suggest puree/level 1 diet, honey thick liquids and liquids by tsp only    Lola Gaines, DTR  N[employer]

## 2021-07-13 NOTE — DISCHARGE INSTRUCTIONS
Please follow up with your primary care provider in the next few weeks to review the course of your hospitalization      Osteomyelitis   WHAT YOU NEED TO KNOW:   Osteomyelitis is a severe bone infection  It can develop in any bone, but often involves the long bones, such as your arm and leg bones, or the bones of the spine  Osteomyelitis is caused by different types of germs, such as bacteria or a fungus  DISCHARGE INSTRUCTIONS:   Call your local emergency number (911 in the [address] [place] [address],[address]) if:   · You have sudden trouble breathing  Return to the emergency department if:   · You have severe pain  · Your bone breaks  Call your doctor if:   · Your symptoms return  · You have increased swelling, pain, or redness of your infected area  · You have new drainage or an odor from your wound  · You have questions or concerns about your condition or care  Medicines: You may need any of the following:  · Prescription pain medicine  may be given  Ask your healthcare provider how to take this medicine safely  Some prescription pain medicines contain acetaminophen  Do not take other medicines that contain acetaminophen without talking to your healthcare provider  Too much acetaminophen may cause liver damage  Prescription pain medicine may cause constipation  Ask your healthcare provider how to prevent or treat constipation  · Antibiotics  help treat or prevent a bacterial infection  · Antifungals  help treat or prevent a fungal infection  · Acetaminophen  decreases pain and fever  It is available without a doctor's order  Ask how much to take and how often to take it  Follow directions  Read the labels of all other medicines you are using to see if they also contain acetaminophen, or ask your doctor or pharmacist  Acetaminophen can cause liver damage if not taken correctly  Do not use more than 4 grams (4,000 milligrams) total of acetaminophen in one day  · Take your medicine as directed    Contact your healthcare provider if you think your medicine is not helping or if you have side effects  Tell him or her if you are allergic to any medicine  Keep a list of the medicines, vitamins, and herbs you take  Include the amounts, and when and why you take them  Bring the list or the pill bottles to follow-up visits  Carry your medicine list with you in case of an emergency  Rest and immobilize: You may need to rest and wear a splint to help your bone heal  A splint will prevent your bone from moving  Keep weight off of your leg by using crutches, a cane, or walker as directed  Ask your healthcare provider for more information about splints and when you can return to your normal activities  Eat a variety of healthy foods:  Healthy foods include fruits, vegetables, whole-grain breads, low-fat dairy products, beans, lean meats, and fish  Healthy foods will help you heal  Ask if you need to be on a special diet  Do not smoke:  Nicotine and other chemicals in cigarettes and cigars can cause lung damage and prevent healing  Ask your healthcare provider for information if you currently smoke and need help to quit  E-cigarettes or smokeless tobacco still contain nicotine  Talk to your healthcare provider before you use these products  If you smoke, it is never too late to quit  Ask your healthcare provider for information if you need help quitting  Control other medical conditions:  Control other medical conditions, such as diabetes, to prevent more bone damage  It is hard to get rid of an infection if your blood sugars are high  Wound care:  Care for your wound as directed  Carefully wash the wound with soap and water  Dry the area and put on new, clean bandages as directed  Change your bandages when they get wet or dirty  Follow up with your doctor as directed: You may need to return for more blood tests or x-rays  Write down your questions so you remember to ask them during your visits    © Copyright [employer] [address] [place] [address] Information is for [employer] use only and may not be sold, redistributed or otherwise used for commercial purposes  All illustrations and images included in CareNotes® are the copyrighted property of A D A M , Inc  or Britni Keith  The above information is an  only  It is not intended as medical advice for individual conditions or treatments  Talk to your doctor, nurse or pharmacist before following any medical regimen to see if it is safe and effective for you  ARTERIOGRAM    WHAT YOU SHOULD KNOW:   An angiogram is a procedure to look at arteries in your body  Arteries are the blood vessels that carry blood from your heart to your body  AFTER YOU LEAVE:     Self-care:   · Limit activity: Rest for the remainder of the day of your procedure  Have some one with you until the next morning  Keep your arm or leg straight as much as possible  Rest as much as possible, sitting lying or reclining  Walk only to go to the bathroom, to bed or to eat  If the angiogram catheter was put in your leg, use the stairs as little as possible  No driving  · Keep your wound clean and dry  You may shower 24 hours after your procedure  The bandage you have on should fall off in 2-3 days  If there is any drainage from the puncture site, you should put on a clean bandage  · Watch for bleeding and bruising: It is normal to have a bruise and soreness where the angiogram catheter went in  · Diet:   · You may resume your regular diet, Sips of flat soda will help with mild nausea  · Drink more liquids than usual for the next 24 hours      · IMMEDIATELY Contact Interventional Radiology at [phone] Kayla PATIENTS: Contact Interventional Radiology at [phone]) Bhavin Malcolm PATIENTS: Contact Interventional Radiology at [phone]) if any of the following occur:  · If your bruise gets larger or if you notice any active bleeding  APPLY DIRECT PRESSURE TO THE BLEEDING SITE     · If you notice increased swelling or have increased pain at the puncture site   · If you have any numbness or pain in the extremity of the puncture site   · If that extremity seems cold or pale  · You have fever greater than 101  · Persistent nausea or vomitting    Follow up with your primary healthcare provider  as directed: Write down your questions so you remember to ask them during your visits              General Anesthesia   WHAT YOU NEED TO KNOW:   What is general anesthesia? General anesthesia is medicine to help keep you asleep, relaxed, and pain free during a procedure or surgery  The medicine is given through your IV or, it may be a gas that is inhaled  Your healthcare provider usually uses both the inhaled and the IV medicines together  How can I prepare for anesthesia? If you smoke, your healthcare provider will instruct you to stop at least 24 hours before you have anesthesia  He will tell you not to eat or drink after a certain time  This decreases your chance of problems during and after surgery  He will tell you what medicines you can or cannot take  Tell your healthcare provider if you or any family member has had any problems with anesthesia in the past  Your healthcare provider will instruct you to not wear makeup or nail polish  Arrange to have someone drive you home from the hospital   What else do I need to know about anesthesia? You may receive an IV antibiotic before your procedure or surgery  A breathing tube may be placed down your throat once you are asleep  You may instead have a mask placed over your nose and mouth  The breathing tube or mask will be hooked to oxygen and monitored by your healthcare provider  He will also monitor your blood pressure, heart rate, and breathing during your procedure  He may need to give you more medicine to keep you asleep during your procedure  The amount and type of medicine will depend on any medical condition you may have     What happens after general anesthesia? You will be taken to a room where you can rest until you are awake  You may be cold after waking up from anesthesia  You may also have nausea, vomiting, and a sore throat  Depending on your surgery or procedure, you will be taken to your hospital room or sent home  Do not drive yourself home  It is best if you can have someone stay with you for 24 hours after you have general anesthesia  Do not make important decisions for 24 hours after you receive anesthesia  What are the risks of general anesthesia? You could have a severe reaction to the medicine  The medicine may cause nausea and vomiting  The medicine may also cause you to have a seizure, a very high fever, or a heart attack  These conditions may be life-threatening  CARE AGREEMENT:   You have the right to help plan your care  Learn about your health condition and how it may be treated  Discuss treatment options with your healthcare providers to decide what care you want to receive  You always have the right to refuse treatment  The above information is an  only  It is not intended as medical advice for individual conditions or treatments  Talk to your doctor, nurse or pharmacist before following any medical regimen to see if it is safe and effective for you  © Copyright [address] Information is for End User's use only and may not be sold, redistributed or otherwise used for commercial purposes  All illustrations and images included in CareNotes® are the copyrighted property of A D A M , Inc  or [address] [place] [address]    Discharge Instructions - Podiatry    Weight Bearing Status: Partial weight bearing (50%) partial to heal in surgical shoes                 Rest and elevate right lower extremity  Follow-up appointment instructions: Please make an appointment within one week of discharge with Dr Swanson Genera   Contact sooner if any increase in pain, or signs of infection occur    Wound Care: Leave dressings clean, dry, and intact between professional dressing changes    Nursing Instructions: Please apply Betadine soaked adaptic  Then cover with Gauze and secure with Kerlix and tape  Please change dressing every 3 days

## 2021-07-13 NOTE — PROGRESS NOTES
INTERNAL MEDICINE RESIDENCY PROGRESS NOTE     PATIENT INFORMATION     Name: Filipe Zuniga   Age & Sex: 80 y o  male   MRN: [id number]  Hospital Stay Days: 17  Unit/Bed#: [facility] 320-01   Encounter: [id number]  Team: SOD Team B     ASSESSMENT/PLAN     Principal Problem:    Right second toe ulcer ([facility] )  Active Problems:    Dementia without behavioral disturbance ([place] [id number] )    History of coronary artery disease    Peripheral vascular disease in diabetes mellitus ([place] [address] )    BPH (benign prostatic hyperplasia)    Type 2 diabetes mellitus, with long-term current use of insulin ([facility])    Diastolic heart failure ([place] [address] )    Hypertension    Anemia    Hearing difficulty of both ears    Actinic dermatitis    Constipation    Constipation  Assessment & Plan  Unknown last bowel movement on admission, most recent bowel movement 7/8  · Miralax 17 g daily   · Senokot daily  · Dulcolax 10 mg suppository PRN      Actinic dermatitis  Assessment & Plan  Attending admitted noted on admission including scald and different areas mainly bilateral groin and right lower extremity  · Clotrimazole cream  · Wound care consult    Hearing difficulty of both ears  Assessment & Plan  Hearing difficulty noted on admission most likely presbycusis         Anemia  Assessment & Plan  History of anemia, hemoglobin baseline 11-13, hemoglobin stable  · Continue home ferrous sulfate 325 ; will give every other day instead of daily while inpatient  · Bowel regimen daily  · May need MMA, folate, iron panel as outpatient  · Continue to monitor daily CBC      Hypertension  Assessment & Plan  History of hypertension, blood pressure well controlled on admission 130s over 60s  · Continue home Lopressor 25 b i d   · Continue Bumex 0 5 mg daily - diastolic heart failure  · Monitor vitals    Diastolic heart failure (HCC)  Assessment & Plan  Wt Readings from Last 3 Encounters:   07/13/21 68 5 kg (151 lb 0 2 oz)   03/10/20 77 1 kg (170 lb)   09/10/19 77 1 kg (170 lb)     History of chronic diastolic heart failure, right lower extremity grade 1 edema on admission, denies shortness of breath, CTAB on admission  Plan  · Continue home Lopressor 25 b i d   · Continue home Bumex 0 5 mg q d  · Daily in/out  · Cardiac diet, Na <2G and <2L fluid     Type 2 diabetes mellitus, with long-term current use of insulin [facility])  Assessment & Plan  Lab Results   Component Value Date    HGBA1C 6 9 (H) 06/26/2021       Recent Labs     07/12/21  1610 07/12/21  2110 07/13/21  0644 07/13/21  1105   POCGLU 162* 200* 135 208*       Blood Sugar Average: Last 72 hrs:  (P) 647 [id number]   Relatively controlled H A1c 6 9  Home medications include Lantus 12 units at noon, and metformin 500  · Hold metformin while inpatient  · Decreased Lantus to 6 units in setting of decreased oral intake and sugars in 's  · Monitor glucose and adjust as needed    BPH (benign prostatic hyperplasia)  Assessment & Plan  Known history of large prostate, came with suprapubic Hays in place  · Continue home Flomax 0 4 mg daily  · Consider holding Myrbetriq given the suprapubic Hays in place    Peripheral vascular disease in diabetes mellitus [facility])  Assessment & Plan  S/p L BKA  6/27 Lower limb arterial duplex - Diffuse disease noted throughout the femoropopliteal arteries without  significant focal stenosis  Evidence suggestive of Tibioperoneal occlusive disease  Ankle/Brachial index: Non-compressible  Metatarsal pressure and Great toe pressure unobtainable secondary to attenuated PPG waveform  PVR/ PPG tracings are dampened    · Continue ASA 81 mg daily  · Continue Plavix 75 mg daily  · See 2nd right toe ulcer assessment and plan    History of coronary artery disease  Assessment & Plan  History of coronary artery disease, denies any chest pain or palpitation on admission    Plan  · Continue aspirin 81 mg daily  · Continue Plavix 7 mg daily  · Continue atorvastatin 10 mg daily    Dementia without behavioral disturbance [facility])  Assessment & Plan  History of dementia without behavioral disturbance, old history of stroke, AO x1 to self, baseline and stable  His niece, Truong Vargas, is POA  Contact at [phone]     * Right second toe ulcer [facility])  Assessment & Plan  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 confirmed by foot x-ray  Leukocytosis on admission 11 5, now WNL  CRP of 18 4, ESR 59 on admission  Patient is afebrile, no tachycardia no tachypnea, blood pressure WNL   Arteriogram - "Successful angioplasty of the tibioperoneal trunk and anterior tibial artery due to severe stenoses  3 vessel run-off now present "    · Podiatry following, appreciate recommendations  Patient and POA Shyann Lr) agreeable to plan for amputation of right 2nd toe on     · ID consulted  · Culture of toe ulcer taken  -> 4+ pseudomonas, 2+ MRSA  · Vancomycin and Cefepime dc'd () following right 2nd toe amputation   · Daily CBC  · Monitor vitals  · Wound care  · P r n  Tylenol  · Post-surgical dressing change   · PT/OT following  · Patient is medically stable for discharge to SNF          Disposition:  Pending discharge to [address] [place] [address]     Patient seen and examined  No acute events overnight  Today overall the patient was feeling well and had no acute complaints  OBJECTIVE     Vitals:    21 1500 21 0101 21 0524 21 0700   BP: 107/64 129/63  111/87   BP Location: Left arm Left arm  Left arm   Pulse: (!) 118 (!) 117  (!) 109   Resp: 16 18  (!) 46   Temp: 97 9 °F (36 6 °C) 98 3 °F (36 8 °C)  98 4 °F (36 9 °C)   TempSrc: Oral Oral  Oral   SpO2: 94% 99%  97%   Weight:   68 5 kg (151 lb 0 2 oz)    Height:          Temperature:   Temp (24hrs), Av 2 °F (36 8 °C), Min:97 9 °F (36 6 °C), Max:98 4 °F (36 9 °C)    Temperature: 98 4 °F (36 9 °C)  Intake & Output:  I/O        07 - 07/ 07    P  O   630 465    I V  (mL/kg) 335 (5 1) 2000 (29 2)     IV Piggyback  50     Total Intake(mL/kg) 335 (5 1) 2680 (39 1) 465 (6 8)    Urine (mL/kg/hr) 700 (0 4) 1200 (0 7)     Stool  0     Total Output 700 1200     Net -365 +1480 +465           Unmeasured Stool Occurrence  4 x           Intake/Output Summary (Last 24 hours) at 7/13/2021 1337  Last data filed at 7/13/2021 1100  Gross per 24 hour   Intake 2645 ml   Output 800 ml   Net 1845 ml     I/O this shift: In: 46 [P O :465]  Out: -   Weights:   IBW (Ideal Body Weight): 61 5 kg    Body mass index is 25 13 kg/m²  Weight (last 2 days)     Date/Time   Weight    07/13/21 0524   68 5 (151 02)    07/12/21 0600   65 3 (143 96)    07/11/21 0600   64 2 (141 54)            Physical Exam  Constitutional:       Appearance: He is well-developed  HENT:      Head: Normocephalic and atraumatic  Nose: Nose normal    Eyes:      General:         Right eye: No discharge  Left eye: No discharge  Conjunctiva/sclera: Conjunctivae normal       Pupils: Pupils are equal, round, and reactive to light  Neck:      Thyroid: No thyromegaly  Cardiovascular:      Rate and Rhythm: Normal rate and regular rhythm  Heart sounds: Normal heart sounds  No murmur heard  No friction rub  No gallop  Pulmonary:      Effort: Pulmonary effort is normal  No respiratory distress  Breath sounds: Normal breath sounds  No stridor  No wheezing or rales  Chest:      Chest wall: No tenderness  Abdominal:      General: Bowel sounds are normal  There is no distension  Palpations: Abdomen is soft  There is no mass  Tenderness: There is no abdominal tenderness  There is no guarding or rebound  Musculoskeletal:      Comments: Left BKA  Right 2nd toe amputation  Lymphadenopathy:      Cervical: No cervical adenopathy  Skin:     General: Skin is warm and dry  Neurological:      Mental Status: He is alert and oriented to person, place, and time     Psychiatric:         Mood and Affect: Mood normal          Behavior: Behavior normal          Thought Content: Thought content normal          Judgment: Judgment normal         LABORATORY DATA     Labs: I have personally reviewed pertinent reports  Results from last 7 days   Lab Units 07/13/21  0518 07/12/21  0519 07/11/21  1237 07/11/21  0606 07/10/21  1024   WBC Thousand/uL 10 92* 12 21*  --  9 70 11 53*   HEMOGLOBIN g/dL 9 8* 10 4*  --  9 7* 10 9*   HEMATOCRIT % 30 9* 32 6*  --  30 2* 34 3*   PLATELETS Thousands/uL 312 319 311 318 390   NEUTROS PCT % 73  --   --  68 78*   MONOS PCT % 6  --   --  7 6      Results from last 7 days   Lab Units 07/13/21  0518 07/12/21  0519 07/11/21  0607 07/08/21  1706   POTASSIUM mmol/L 3 8 4 3 4 1  --    CHLORIDE mmol/L 105 107 111*  --    CO2 mmol/L 26 26 25  --    CO2, I-STAT mmol/L  --   --   --  25   BUN mg/dL 13 12 14  --    CREATININE mg/dL 0 82 0 78 0 82  --    CALCIUM mg/dL 8 7 9 1 8 9  --    ALK PHOS U/L  --  121*  --   --    ALT U/L  --  13  --   --    AST U/L  --  14  --   --    GLUCOSE, ISTAT mg/dl  --   --   --  204*     Serum creatinine: 0 82 mg/dL 07/13/21 0518  Estimated creatinine clearance: 50 mL/min   Results from last 7 days   Lab Units 07/12/21  0519   MAGNESIUM mg/dL 2 3          Results from last 7 days   Lab Units 07/08/21  0502   INR  1 24*               IMAGING & DIAGNOSTIC TESTING     Radiology Results: I have personally reviewed pertinent reports  XR foot 3+ views RIGHT    Result Date: 6/27/2021  Impression: 2nd mid phalanx, 2nd distal phalanx osteomyelitis suspected  The study was marked in [facility] for immediate notification  Workstation performed: [id number]     Other Diagnostic Testing: I have personally reviewed pertinent reports        ACTIVE MEDICATIONS     Current Facility-Administered Medications   Medication Dose Route Frequency    acetaminophen (TYLENOL) tablet 488 mg  488 mg Oral Q6H PRN    aspirin (ECOTRIN LOW STRENGTH) EC tablet 81 mg  81 mg Oral Daily    atorvastatin (LIPITOR) tablet 40 mg  40 mg Oral Daily With Dinner    bisacodyl (DULCOLAX) rectal suppository 10 mg  10 mg Rectal Daily PRN    bumetanide (BUMEX) tablet 0 5 mg  0 5 mg Oral Daily    cholecalciferol (VITAMIN D3) tablet 1,000 Units  1,000 Units Oral Daily    clopidogrel (PLAVIX) tablet 75 mg  75 mg Oral Daily    clotrimazole (LOTRIMIN) 1 % cream   Topical BID    enoxaparin (LOVENOX) subcutaneous injection 40 mg  40 mg Subcutaneous Daily    ferrous sulfate tablet 325 mg  325 mg Oral Daily With Breakfast    fluticasone (FLONASE) 50 mcg/act nasal spray 1 spray  1 spray Each Nare Daily    insulin glargine (LANTUS) subcutaneous injection 6 Units 0 06 mL  6 Units Subcutaneous Daily    insulin lispro (HumaLOG) 100 units/mL subcutaneous injection 1-5 Units  1-5 Units Subcutaneous 4x Daily (AC & HS)    melatonin tablet 3 mg  3 mg Oral HS    metoprolol tartrate (LOPRESSOR) tablet 50 mg  50 mg Oral Q12H AGGIE    pantoprazole (PROTONIX) EC tablet 40 mg  40 mg Oral BID    polyethylene glycol (MIRALAX) packet 17 g  17 g Oral Daily    saccharomyces boulardii (FLORASTOR) capsule 250 mg  250 mg Oral BID    senna-docusate sodium (SENOKOT S) 8 6-50 mg per tablet 2 tablet  2 tablet Oral HS    sodium chloride 0 9 % infusion  75 mL/hr Intravenous Continuous    tamsulosin (FLOMAX) capsule 0 4 mg  0 4 mg Oral Daily With Dinner     VTE Pharmacologic Prophylaxis: Enoxaparin (Lovenox)  VTE Mechanical Prophylaxis: sequential compression device    ==  Justen Chow MD  IM Resident, PGY-2  [facility] Internal Medicine Residency

## 2021-07-13 NOTE — DISCHARGE SUMMARY
INTERNAL MEDICINE RESIDENCY DISCHARGE SUMMARY     Chantal Betancourt   80 y o  male  MRN: [id number]  Room/Bed: [facility] 320/[facility] 320-01     [address] [place] [address]   Encounter: [id number]    DISCHARGE INFORMATION     PCP at Discharge: Dr Bhavesh Burnett    Admitting Provider: Larae Boas, MD  Admission Date: 6/26/2021    Discharge Provider: Larae Boas, MD  Discharge Date: 7/13/2021    Discharge Disposition: Home/Self Care  Discharge Condition: good  Discharge with Lines: no    Discharge Diet: diabetic diet  Activity Restrictions: none  Test Results Pending at Discharge: None    Discharge Diagnoses:  Principal Problem:    Right second toe ulcer ([facility] )  Active Problems:    Dementia without behavioral disturbance ([facility] )    History of coronary artery disease    Peripheral vascular disease in diabetes mellitus ([facility] )    BPH (benign prostatic hyperplasia)    Type 2 diabetes mellitus, with long-term current use of insulin ([facility])    Diastolic heart failure ([facility] )    Hypertension    Anemia    Hearing difficulty of both ears    Actinic dermatitis    Constipation  Resolved Problems:    Hyperlipidemia    Dark urine    Constipation  Assessment & Plan  Unknown last bowel movement on admission, most recent bowel movement 7/8  · Miralax 17 g daily   · Senokot daily  · Dulcolax 10 mg suppository PRN      Actinic dermatitis  Assessment & Plan  Attending admitted noted on admission including scald and different areas mainly bilateral groin and right lower extremity  · Clotrimazole cream  · Wound care consult    Hearing difficulty of both ears  Assessment & Plan  Hearing difficulty noted on admission most likely presbycusis         Anemia  Assessment & Plan  History of anemia, hemoglobin baseline 11-13, hemoglobin stable  · Continue home ferrous sulfate 325 ; will give every other day instead of daily while inpatient  · Bowel regimen daily  · May need MMA, folate, iron panel as outpatient  · Continue to monitor daily CBC      Hypertension  Assessment & Plan  History of hypertension, blood pressure well controlled on admission 130s over 60s  · Continue home Lopressor 25 b i d   · Continue Bumex 0 5 mg daily - diastolic heart failure  · Monitor vitals    Diastolic heart failure (HCC)  Assessment & Plan  Wt Readings from Last 3 Encounters:   07/13/21 68 5 kg (151 lb 0 2 oz)   03/10/20 77 1 kg (170 lb)   09/10/19 77 1 kg (170 lb)     History of chronic diastolic heart failure, right lower extremity grade 1 edema on admission, denies shortness of breath, CTAB on admission  Plan  · Continue home Lopressor 25 b i d   · Continue home Bumex 0 5 mg q d  · Daily in/out  · Cardiac diet, Na <2G and <2L fluid     Type 2 diabetes mellitus, with long-term current use of insulin [facility])  Assessment & Plan  Lab Results   Component Value Date    HGBA1C 6 9 (H) 06/26/2021       Recent Labs     07/12/21  1610 07/12/21  2110 07/13/21  0644 07/13/21  1105   POCGLU 162* 200* 135 208*       Blood Sugar Average: Last 72 hrs:  (P) 477 [id number]   Relatively controlled H A1c 6 9  Home medications include Lantus 12 units at noon, and metformin 500  · Hold metformin while inpatient  · Decreased Lantus to 6 units in setting of decreased oral intake and sugars in 's  · Monitor glucose and adjust as needed    BPH (benign prostatic hyperplasia)  Assessment & Plan  Known history of large prostate, came with suprapubic Hays in place  · Continue home Flomax 0 4 mg daily  · Consider holding Myrbetriq given the suprapubic Hays in place    Peripheral vascular disease in diabetes mellitus [facility])  Assessment & Plan  S/p L BKA  6/27 Lower limb arterial duplex - Diffuse disease noted throughout the femoropopliteal arteries without  significant focal stenosis  Evidence suggestive of Tibioperoneal occlusive disease     Ankle/Brachial index: Non-compressible  Metatarsal pressure and Great toe pressure unobtainable secondary to attenuated PPG waveform  PVR/ PPG tracings are dampened  · Continue ASA 81 mg daily  · Continue Plavix 75 mg daily  · See 2nd right toe ulcer assessment and plan    History of coronary artery disease  Assessment & Plan  History of coronary artery disease, denies any chest pain or palpitation on admission    Plan  · Continue aspirin 81 mg daily  · Continue Plavix 7 mg daily  · Continue atorvastatin 10 mg daily    Dementia without behavioral disturbance (HCC)  Assessment & Plan  History of dementia without behavioral disturbance, old history of stroke, AO x1 to self, baseline and stable  His niece, Carly, is POA  Contact at [phone]     * Right second toe ulcer [facility])  Assessment & Plan  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 confirmed by foot x-ray  Leukocytosis on admission 11 5, now WNL  CRP of 18 4, ESR 59 on admission  Patient is afebrile, no tachycardia no tachypnea, blood pressure WNL  7/8 Arteriogram - "Successful angioplasty of the tibioperoneal trunk and anterior tibial artery due to severe stenoses  3 vessel run-off now present "    · Podiatry following, appreciate recommendations  Patient and SOLIS Hitchcock) agreeable to plan for amputation of right 2nd toe on 7/11    · ID consulted  · Culture of toe ulcer taken 7/2 -> 4+ pseudomonas, 2+ MRSA  · Vancomycin and Cefepime dc'd (7/12) following right 2nd toe amputation 7/11  · Daily CBC  · Monitor vitals  · Wound care  · P r n  Tylenol  · Post-surgical dressing change 7/13  · PT/OT following  · Patient is medically stable for discharge to SNF          Consulting Providers:      Diagnostic & Therapeutic Procedures Performed:  XR foot 3+ views RIGHT    Result Date: 6/27/2021  Impression: 2nd mid phalanx, 2nd distal phalanx osteomyelitis suspected  The study was marked in [facility] for immediate notification   Workstation performed: [id number]       Code Status: Level 3 - DNAR and DNI  Advance Directive & Living Will: <no information>  Power of :    POLST:      Medications:  Current Discharge Medication List        Current Discharge Medication List        Current Discharge Medication List      CONTINUE these medications which have NOT CHANGED    Details   acetaminophen (TYLENOL) 500 mg tablet Take 500 mg by mouth every 6 (six) hours as needed for mild pain      aspirin (ECOTRIN LOW STRENGTH) 81 mg EC tablet Take 81 mg by mouth daily      bumetanide (BUMEX) 0 5 MG tablet Take 0 5 mg by mouth daily      cholecalciferol (VITAMIN D3) 1,000 units tablet Take 1,000 Units by mouth daily      clopidogrel (PLAVIX) 75 mg tablet Take 75 mg by mouth daily      ferrous sulfate 325 (65 Fe) mg tablet Take 325 mg by mouth daily with breakfast      fluticasone (FLONASE) 50 mcg/act nasal spray fluticasone propionate 50 mcg/actuation nasal spray,suspension      glucose 40 % Take 15 g by mouth once      glucose blood test strip test blood sugar & accucheck fasting at 7am and 2 hours after      insulin glargine (LANTUS) 100 units/mL subcutaneous injection Inject under the skin daily at bedtime      INSULIN SYRINGE  5CC/28G 28G X 1/2" 0 5 ML MISC Monoject Insulin Safety Syringe 0 5 mL 29 gauge x 1/2"      ketoconazole (NIZORAL) 2 % cream Apply topically daily      Menthol-Zinc Oxide (CALMOSEPTINE EX) Apply 1 application topically      Menthol-Zinc Oxide (RISAMINE EX) Apply topically      metFORMIN (GLUCOPHAGE) 500 mg tablet Take 500 mg by mouth daily with breakfast      Mirabegron ER (MYRBETRIQ) 50 MG TB24 Take by mouth      NON FORMULARY 1 application as needed      nystatin-triamcinolone (MYCOLOG-II) ointment Apply 1 application topically 2 (two) times a day      pantoprazole (PROTONIX) 40 mg tablet Take 40 mg by mouth 2 (two) times a day      polyethylene glycol (GLYCOLAX) powder Take 17 g by mouth daily  Qty: 255 g, Refills: 0    Associated Diagnoses: Constipation      tamsulosin (FLOMAX) 0 4 mg 0 4 mg           Allergies:  No Known Allergies  FOLLOW-UP     PCP Outpatient Follow-up:  Yes patient was instructed to follow-up with his primary care provider in around 1 week following discharge    Consulting Providers Follow-up:  none required     Active Issues Requiring Follow-up:   none    Physical Exam  Constitutional:       Appearance: He is well-developed  HENT:      Head: Normocephalic and atraumatic  Nose: Nose normal    Eyes:      General:         Right eye: No discharge  Left eye: No discharge  Conjunctiva/sclera: Conjunctivae normal       Pupils: Pupils are equal, round, and reactive to light  Neck:      Thyroid: No thyromegaly  Cardiovascular:      Rate and Rhythm: Normal rate and regular rhythm  Heart sounds: Normal heart sounds  No murmur heard  No friction rub  No gallop  Pulmonary:      Effort: Pulmonary effort is normal  No respiratory distress  Breath sounds: Normal breath sounds  No stridor  No wheezing or rales  Chest:      Chest wall: No tenderness  Abdominal:      General: Bowel sounds are normal  There is no distension  Palpations: Abdomen is soft  There is no mass  Tenderness: There is no abdominal tenderness  There is no guarding or rebound  Musculoskeletal:         General: No swelling or tenderness  Right lower leg: No edema  Left lower leg: No edema  Comments: Left BKA  Right 2nd toe amputation  Lymphadenopathy:      Cervical: No cervical adenopathy  Skin:     General: Skin is warm and dry  Neurological:      Mental Status: He is alert  Psychiatric:         Mood and Affect: Mood normal          Behavior: Behavior normal          Thought Content:  Thought content normal          Judgment: Judgment normal          DETAILS OF HOSPITAL COURSE     HPI as per Dr Frankie Jefferson: "Patient to the 80years old male with past medical history significant for coronary artery disease, peripheral vascular disease, old CVA, DM type 2 on insulin, hyperlipidemia, BPH, incontinence, hypertension, diastolic heart failure, dementia without behavioral changes, left BKA with prosthetic leg will lives in a group home and presented to the ED with right 2nd toe a ulcer on the dorsal surface       Per reviewing the documents from the group home, patient completed 10 days of cephalexin 500 mg b i d  From Bridget 15 to June 25, documents were uploaded to the media tab inpatient EMR       In the ED, patient was given IV Flagyl and Ancef x 1 does  Podiatry comes consulted and examined the patient  Recommended starting IV antibiotics, x-ray , and will follow on management plan       Patient is poor historian, known history of dementia, alert only to self, not place or time, however able to answer and provide information on why he is in the hospital, brother and sister names, and he confirms that he wants no intubation or resuscitation if needed stating "I would rather die if that happened" "    The patient was admitted and was started on IV antibiotics for his right 2nd toe ulcer  X-ray of the foot suspicious for osteomyelitis and amputation was recommended  The patient's POA Myah Qureshi refused amputation stating that she would rather treat his condition with medical management  There was a plan for the patient to have PICC line placed and receive around 6 weeks of IV antibiotics, however prior to PICC line placement the POA changed her mind and decided to continue with the right toe amputation  The patient underwent right 2nd toe amputation on 07/11/2021  Following the procedure the patient's antibiotics were discontinued due to the source of the infection being removed  Later on 07/13/2021 the patient was deemed medically stable for discharge  He was accepted at [facility] SN[facility]  He was instructed to follow-up with his primary care provider in the next few weeks following discharge  Discharge Statement:   I spent 1 hour minutes discharging the patient   This time was spent on the day of discharge  I had direct contact with the patient on the day of discharge   Additional documentation is required if more than 30 minutes were spent on discharge     ==  Jordon Plunkett MD  IM Resident, PGY-2  [facility] Internal Medicine Residency

## 2021-07-15 ENCOUNTER — NURSING HOME VISIT (OUTPATIENT)
Dept: GERIATRICS | Facility: OTHER | Age: 86
End: 2021-07-15
Payer: MEDICARE

## 2021-07-15 DIAGNOSIS — I50.32 CHRONIC HEART FAILURE WITH PRESERVED EJECTION FRACTION (HCC): ICD-10-CM

## 2021-07-15 DIAGNOSIS — K59.00 CONSTIPATION, UNSPECIFIED CONSTIPATION TYPE: ICD-10-CM

## 2021-07-15 DIAGNOSIS — Z79.4 TYPE 2 DIABETES MELLITUS WITHOUT COMPLICATION, WITH LONG-TERM CURRENT USE OF INSULIN (HCC): ICD-10-CM

## 2021-07-15 DIAGNOSIS — Z89.421 HISTORY OF AMPUTATION OF LESSER TOE OF RIGHT FOOT (HCC): Primary | ICD-10-CM

## 2021-07-15 DIAGNOSIS — L97.519 SKIN ULCER OF SECOND TOE, RIGHT, WITH UNSPECIFIED SEVERITY (HCC): ICD-10-CM

## 2021-07-15 DIAGNOSIS — I10 HYPERTENSION, UNSPECIFIED TYPE: ICD-10-CM

## 2021-07-15 DIAGNOSIS — N40.1 BENIGN PROSTATIC HYPERPLASIA WITH LOWER URINARY TRACT SYMPTOMS, SYMPTOM DETAILS UNSPECIFIED: ICD-10-CM

## 2021-07-15 DIAGNOSIS — Z86.79 HISTORY OF CORONARY ARTERY DISEASE: ICD-10-CM

## 2021-07-15 DIAGNOSIS — F03.90 DEMENTIA WITHOUT BEHAVIORAL DISTURBANCE, UNSPECIFIED DEMENTIA TYPE (HCC): ICD-10-CM

## 2021-07-15 DIAGNOSIS — Z71.89 GOALS OF CARE, COUNSELING/DISCUSSION: ICD-10-CM

## 2021-07-15 DIAGNOSIS — E11.51 PERIPHERAL VASCULAR DISEASE IN DIABETES MELLITUS (HCC): ICD-10-CM

## 2021-07-15 DIAGNOSIS — M86.9 OSTEOMYELITIS OF SECOND TOE OF RIGHT FOOT (HCC): ICD-10-CM

## 2021-07-15 DIAGNOSIS — E11.9 TYPE 2 DIABETES MELLITUS WITHOUT COMPLICATION, WITH LONG-TERM CURRENT USE OF INSULIN (HCC): ICD-10-CM

## 2021-07-15 PROCEDURE — 99306 1ST NF CARE HIGH MDM 50: CPT | Performed by: INTERNAL MEDICINE

## 2021-07-15 NOTE — PROGRESS NOTES
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 [facility])  Assessment & Plan:  · Performed on July 11, 2021   · Will continue his care in collaboration with the podiatry service   · Will consult PT/OT for evaluation and treatment to assist him in returning to his prior level of functioning  · Will consult physiatry to assist in his care   · Will continue with pain management  · Will continue to monitor for change in his condition      2  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 [facility])  Assessment & Plan:  · Doing well status post amputation on July 11, 2021   · Infectious Disease service recommended discontinuation of antibiotic therapy   · Will consult PT/OT for evaluation and treatment to assist him in reaching his prior level of functioning   · Will continue with monitoring for change in his condition      3  Skin ulcer of second toe, right, with unspecified severity ([address] )  Assessment & Plan:  · Complicated by osteomyelitis   · Doing well status post amputation of his right 2nd toe   · He was seen in consultation by the Infectious Disease, vascular, Interventional Radiology, and podiatry services during his hospitalization  · Will consult PT/OT for evaluation and treatment to assist him in returning to his prior level of functioning   · Will continue with monitoring for change in his condition      4  Chronic heart failure with preserved ejection fraction (HCC)  Assessment & Plan:  · Will continue his outpatient regimen of bumetanide  · Will continue with clinical and periodic laboratory monitoring for changes condition   · He will follow up with his PCP upon discharge        5   Benign prostatic hyperplasia with lower urinary tract symptoms, symptom details unspecified  Assessment & Plan:  · Will continue his outpatient regimen of tamsulosin and Mirabegron  · Will continue with monitoring for change in his condition  · He will follow up with his PCP upon discharge      6  Constipation, unspecified constipation type  Assessment & Plan:  · Will continue his outpatient regimen of MiraLax and monitor for change in his condition      7  Dementia without behavioral disturbance, unspecified dementia type [facility])  Assessment & Plan:  · Will provide a safe, secure, structured, and supportive environment at the rehabilitation facility  · Will continue with monitoring for change in his condition  · He will follow up with his PCP upon discharge      8  History of coronary artery disease  Assessment & Plan:  · Will continue his outpatient secondary prevention regimen of aspirin and atorvastatin   · Will continue to monitor for change in his condition   · He will follow-up with his PCP upon discharge      9  Hypertension, unspecified type  Assessment & Plan:  · Review of his BP and AP logs looks well   · Will continue with his outpatient regimen of metoprolol tartrate  · Will continue with monitoring for change in his condition   · He will follow up with his PCP upon discharge      10  Type 2 diabetes mellitus without complication, with long-term current use of insulin ([facility])  Assessment & Plan:    Lab Results   Component Value Date    HGBA1C 6 9 (H) 06/26/2021     · Review of his fingerstick blood sugar log looks well without insulin glargine  · Will continue his metformin medication  · Will check a daily fasting fingerstick blood sugar level  · Will continue with monitoring for change in his condition   · He will follow up with his PCP upon discharge      11   Peripheral vascular disease in diabetes mellitus [facility])  Assessment & Plan:    · Status post successful angioplasty of right leg artery on July 8, 2021   · Will continue with secondary preventive medication of aspirin and atorvastatin  · Will continue with monitoring for change in his condition   · He will follow up with his PCP upon discharge      12  Goals of care, counseling/discussion  Assessment & Plan:  · Seen in consultation by the palliative and supportive care service during his hospitalization in June 2021  · His resuscitation status is "do not resuscitate"        All medications and routine orders were reviewed and updated as needed  Plan discussed with:  Nursing staff  Chief Complaint     He is seen for a visit to perform a history and physical exam to be admitted to the rehabilitation facility  History of Present Illness     He is a pleasant 35-year-old gentleman with the comorbidities of chronic heart failure with preserved ejection fraction, coronary artery disease, hypertension, type 2 diabetes mellitus, peripheral arterial disease, dementia without behavior disturbance, and recent osteomyelitis of his right 2nd toe requiring angioplasty of artery in his right leg and right 2nd toe amputation who is seen for a visit to perform a history and physical exam to be admitted to the rehabilitation facility  He presented to the emergency room on June 26, 2021 from his group home secondary to concern regarding worsening of a skin ulcer on his right 2nd toe  He was admitted to the acute care hospital from June 26, 2021 through July 13, 2021  Evaluation revealed osteomyelitis of his right 2nd toe and significant peripheral arterial disease in his right leg  He was seen in consultation by the palliative and supportive care service to help his family decide on a course of treatment  Initially, medical treatment with antibiotics was considered  However, family made decision for definitive treatment with angioplasty of his right leg artery and then amputation of his right 2nd toe  The Infectious Disease service recommended discontinuation of his antibiotics after removal of his right toe    Historically, he has a left below-knee amputation  A post acute care rehabilitation stay was recommended after his acute care hospital stay  He was felt stable to be transferred to the rehabilitation facility on July 13, 2021  When asked, he has no complaints other than he does not like the food that is provided to him  He would like some ice cream and a banana  HISTORY:  Past Medical History:   Diagnosis Date    Anemia     Dementia ([facility] )     Diabetes mellitus ([facility] )     Diastolic heart failure (HCC)     Hyperlipidemia     Hypertension     Osteoarthritis     PVD (peripheral vascular disease) ([facility])      Past Surgical History:   Procedure Laterality Date    AMPUTATION Left     left bka    IR LOWER EXTREMITY ANGIOGRAM  7/8/2021    TOE AMPUTATION Right 7/11/2021    Procedure: 2nd leonid AMPUTATION;  Surgeon: Hillary Douglas DPM;  Location: [facility];  Service: Podiatry     Family History   Problem Relation Age of Onset    Diabetes Mother     Diabetes Sister     Heart disease Brother      Social History     Socioeconomic History    Marital status:      Spouse name: Not on file    Number of children: Not on file    Years of education: Not on file    Highest education level: Not on file   Occupational History    Not on file   Tobacco Use    Smoking status: Never Smoker    Smokeless tobacco: Never Used   Vaping Use    Vaping Use: Never used   Substance and Sexual Activity    Alcohol use: No    Drug use: Never    Sexual activity: Not on file   Other Topics Concern    Not on file   Social History Narrative    Not on file     Social Determinants of Health     Financial Resource Strain:     Difficulty of Paying Living Expenses:    Food Insecurity:     Worried About Running Out of Food in the Last Year:     [address] in the Last Year:    Transportation Needs:     Lack of Transportation (Medical):      Lack of Transportation (Non-Medical):    Physical Activity:     Days of Exercise per Week:     Minutes of Exercise per Session:    Stress:     Feeling of Stress :    Social Connections:     Frequency of Communication with Friends and Family:     Frequency of Social Gatherings with Friends and Family:     Attends Gnostic[employer] Services:     Active Member of Clubs or Organizations:     Attends Club or Organization Meetings:     Marital Status:    Intimate Partner Violence:     Fear of Current or Ex-Partner:     Emotionally Abused:     Physically Abused:     Sexually Abused: Allergies:  No Known Allergies    Review of Systems     Review of Systems   Unable to perform ROS: Dementia       Medications and orders     All medications reviewed and updated in Nursing Home EMR  Objective     Vitals:   Weight 160 7 lb, temperature 97 3° F, pulse 110, blood pressure 130/80, fasting fingerstick blood sugar 131, pulse oximetry 96% on room air  Physical Exam  Vitals reviewed  Constitutional:       General: He is not in acute distress  Appearance: He is well-developed  He is not toxic-appearing or diaphoretic  Comments: He appears comfortable lying in bed, his stated age, chronically ill, and frail  HENT:      Head: Normocephalic  Mouth/Throat:      Mouth: Mucous membranes are moist    Eyes:      General: No scleral icterus  Conjunctiva/sclera: Conjunctivae normal    Cardiovascular:      Rate and Rhythm: Normal rate and regular rhythm  Heart sounds: No murmur heard  No friction rub  No gallop  Pulmonary:      Effort: Pulmonary effort is normal  No respiratory distress  Breath sounds: Normal breath sounds  No stridor  No wheezing, rhonchi or rales  Abdominal:      General: Abdomen is flat  There is no distension  Palpations: Abdomen is soft  There is no mass  Tenderness: There is no abdominal tenderness  There is no guarding or rebound  Musculoskeletal:         General: Deformity (  Left lower leg amputation) present  Skin:     Comments:  There is a dressing on his right foot  Neurological:      Mental Status: He is alert  Comments: He is oriented to himself  Pertinent Laboratory/Diagnostic Studies: The following labs/studies were reviewed please see chart or hospital paperwork for details  Lab Results   Component Value Date    WBC 10 92 (H) 07/13/2021    HGB 9 8 (L) 07/13/2021    HCT 30 9 (L) 07/13/2021    MCV 97 07/13/2021     07/13/2021     Lab Results   Component Value Date    SODIUM 138 07/13/2021    K 3 8 07/13/2021     07/13/2021    CO2 26 07/13/2021    BUN 13 07/13/2021    CREATININE 0 82 07/13/2021    GLUC 110 07/13/2021    CALCIUM 8 7 07/13/2021       Lab Results   Component Value Date    HGBA1C 6 9 (H) 06/26/2021 June 26, 2021:     X-ray right foot:     FINDINGS:     2nd mid phalanx distal plantar, 2nd distal phalanx proximal plantar loss of bony cortex      3rd distal phalanx ungual and proximal 5th metatarsal corticated loss of bony architecture suggesting chronic change     There is no acute fracture or dislocation      Bunion, marked hallux valgus, 1st distal phalanx distal plantar interosseous cystic change, 2nd hammertoe      No lytic or blastic osseous lesion      Soft tissues are unremarkable      IMPRESSION:     2nd mid phalanx, 2nd distal phalanx osteomyelitis suspected      The study was marked in EPIC for immediate notification     - His admission order summary was reviewed and signed  Treatment plan reviewed with nursing staff      SAILAJA Flood   7/16/2021 2:16 PM

## 2021-07-16 PROBLEM — I50.32 CHRONIC HEART FAILURE WITH PRESERVED EJECTION FRACTION (HCC): Status: ACTIVE | Noted: 2021-06-26

## 2021-07-16 PROBLEM — M86.9 OSTEOMYELITIS OF SECOND TOE OF RIGHT FOOT (HCC): Status: ACTIVE | Noted: 2021-07-16

## 2021-07-16 PROBLEM — Z71.89 GOALS OF CARE, COUNSELING/DISCUSSION: Status: ACTIVE | Noted: 2021-07-16

## 2021-07-16 PROBLEM — Z89.421 HISTORY OF AMPUTATION OF LESSER TOE OF RIGHT FOOT (HCC): Status: ACTIVE | Noted: 2021-07-16

## 2021-07-16 NOTE — ASSESSMENT & PLAN NOTE
· Complicated by osteomyelitis   · Doing well status post amputation of his right 2nd toe   · He was seen in consultation by the Infectious Disease, vascular, Interventional Radiology, and podiatry services during his hospitalization  · Will consult PT/OT for evaluation and treatment to assist him in returning to his prior level of functioning   · Will continue with monitoring for change in his condition

## 2021-07-16 NOTE — ASSESSMENT & PLAN NOTE
· Will continue his outpatient secondary prevention regimen of aspirin and atorvastatin   · Will continue to monitor for change in his condition   · He will follow-up with his PCP upon discharge

## 2021-07-16 NOTE — ASSESSMENT & PLAN NOTE
· Seen in consultation by the palliative and supportive care service during his hospitalization in June 2021  · His resuscitation status is "do not resuscitate"

## 2021-07-16 NOTE — ASSESSMENT & PLAN NOTE
· Doing well status post amputation on July 11, 2021   · Infectious Disease service recommended discontinuation of antibiotic therapy   · Will consult PT/OT for evaluation and treatment to assist him in reaching his prior level of functioning   · Will continue with monitoring for change in his condition

## 2021-07-16 NOTE — ASSESSMENT & PLAN NOTE
· Will continue his outpatient regimen of tamsulosin and Mirabegron  · Will continue with monitoring for change in his condition  · He will follow up with his PCP upon discharge

## 2021-07-16 NOTE — ASSESSMENT & PLAN NOTE
· Will provide a safe, secure, structured, and supportive environment at the rehabilitation facility  · Will continue with monitoring for change in his condition  · He will follow up with his PCP upon discharge

## 2021-07-16 NOTE — ASSESSMENT & PLAN NOTE
· Will continue his outpatient regimen of bumetanide  · Will continue with clinical and periodic laboratory monitoring for changes condition   · He will follow up with his PCP upon discharge

## 2021-07-16 NOTE — ASSESSMENT & PLAN NOTE
· Status post successful angioplasty of right leg artery on July 8, 2021   · Will continue with secondary preventive medication of aspirin and atorvastatin  · Will continue with monitoring for change in his condition   · He will follow up with his PCP upon discharge

## 2021-07-16 NOTE — ASSESSMENT & PLAN NOTE
Lab Results   Component Value Date    HGBA1C 6 9 (H) 06/26/2021     · Review of his fingerstick blood sugar log looks well without insulin glargine  · Will continue his metformin medication  · Will check a daily fasting fingerstick blood sugar level  · Will continue with monitoring for change in his condition   · He will follow up with his PCP upon discharge

## 2021-07-16 NOTE — ASSESSMENT & PLAN NOTE
· Review of his BP and AP logs looks well   · Will continue with his outpatient regimen of metoprolol tartrate  · Will continue with monitoring for change in his condition   · He will follow up with his PCP upon discharge

## 2021-07-16 NOTE — ASSESSMENT & PLAN NOTE
· Performed on July 11, 2021   · Will continue his care in collaboration with the podiatry service   · Will consult PT/OT for evaluation and treatment to assist him in returning to his prior level of functioning  · Will consult physiatry to assist in his care   · Will continue with pain management  · Will continue to monitor for change in his condition

## 2021-07-20 ENCOUNTER — NURSING HOME VISIT (OUTPATIENT)
Dept: GERIATRICS | Facility: OTHER | Age: 86
End: 2021-07-20
Payer: MEDICARE

## 2021-07-20 ENCOUNTER — DOCUMENTATION (OUTPATIENT)
Dept: WOUND CARE | Facility: HOSPITAL | Age: 86
End: 2021-07-20

## 2021-07-20 ENCOUNTER — NURSING HOME VISIT (OUTPATIENT)
Dept: WOUND CARE | Facility: HOSPITAL | Age: 86
End: 2021-07-20
Payer: MEDICARE

## 2021-07-20 DIAGNOSIS — L57.8 ACTINIC DERMATITIS: ICD-10-CM

## 2021-07-20 DIAGNOSIS — L89.892 PRESSURE INJURY OF OTHER SITE, STAGE 2 (HCC): ICD-10-CM

## 2021-07-20 DIAGNOSIS — Z79.4 TYPE 2 DIABETES MELLITUS WITHOUT COMPLICATION, WITH LONG-TERM CURRENT USE OF INSULIN (HCC): ICD-10-CM

## 2021-07-20 DIAGNOSIS — R32 DERMATITIS ASSOCIATED WITH INCONTINENCE: ICD-10-CM

## 2021-07-20 DIAGNOSIS — E11.9 TYPE 2 DIABETES MELLITUS WITHOUT COMPLICATION, WITH LONG-TERM CURRENT USE OF INSULIN (HCC): Primary | ICD-10-CM

## 2021-07-20 DIAGNOSIS — Z89.421 HISTORY OF AMPUTATION OF LESSER TOE OF RIGHT FOOT (HCC): ICD-10-CM

## 2021-07-20 DIAGNOSIS — L25.8 DERMATITIS ASSOCIATED WITH INCONTINENCE: ICD-10-CM

## 2021-07-20 DIAGNOSIS — Z91.89: ICD-10-CM

## 2021-07-20 DIAGNOSIS — F33.2 SEVERE EPISODE OF RECURRENT MAJOR DEPRESSIVE DISORDER, WITHOUT PSYCHOTIC FEATURES (HCC): ICD-10-CM

## 2021-07-20 DIAGNOSIS — F32.2 CURRENT SEVERE EPISODE OF MAJOR DEPRESSIVE DISORDER WITHOUT PSYCHOTIC FEATURES WITHOUT PRIOR EPISODE (HCC): Primary | ICD-10-CM

## 2021-07-20 DIAGNOSIS — F03.90 DEMENTIA WITHOUT BEHAVIORAL DISTURBANCE, UNSPECIFIED DEMENTIA TYPE (HCC): ICD-10-CM

## 2021-07-20 DIAGNOSIS — Z79.4 TYPE 2 DIABETES MELLITUS WITHOUT COMPLICATION, WITH LONG-TERM CURRENT USE OF INSULIN (HCC): Primary | ICD-10-CM

## 2021-07-20 DIAGNOSIS — E11.9 TYPE 2 DIABETES MELLITUS WITHOUT COMPLICATION, WITH LONG-TERM CURRENT USE OF INSULIN (HCC): ICD-10-CM

## 2021-07-20 DIAGNOSIS — I10 HYPERTENSION, UNSPECIFIED TYPE: ICD-10-CM

## 2021-07-20 DIAGNOSIS — I50.32 CHRONIC HEART FAILURE WITH PRESERVED EJECTION FRACTION (HCC): Primary | ICD-10-CM

## 2021-07-20 PROBLEM — L89.90 DECUBITUS ULCER: Status: ACTIVE | Noted: 2021-07-20

## 2021-07-20 PROCEDURE — 99310 SBSQ NF CARE HIGH MDM 45: CPT | Performed by: NURSE PRACTITIONER

## 2021-07-20 PROCEDURE — 99305 1ST NF CARE MODERATE MDM 35: CPT | Performed by: NURSE PRACTITIONER

## 2021-07-20 PROCEDURE — 99309 SBSQ NF CARE MODERATE MDM 30: CPT | Performed by: NURSE PRACTITIONER

## 2021-07-20 NOTE — PROGRESS NOTES
[address] MANAGEMENT   AND [facility]PERBARIC MED[facility]INE CENTER       Patient ID: Sergio Douglas is a 80 y o  male Date of Birth 3/31/1929     Location of Service: [facility]    Performed wound round with: Supervisor      Chief Complaint   Patient presents with    New Patient Visit     Sacrum, right shoulder, and back       Wound Instructions:  Orders Placed This Encounter   Procedures    Wound cleansing and dressings     Wound:  Sacrum and buttocks  Discontinue previous wound order  Cleanse the buttocks and sacrum with soap and water, pat dry  Apply hydraguard to wound bed  Frequency : twice a day and prn for soiling    Location : right shoulder and lower back  Cleanse with NSS  Apply bordered foam to wound bed  Frequency : Three times a week and prn for soiling    Offload all wounds  Order low air loss mattress and prevalon boots  Boots at all times when in bed  Provide timely continence care  Turn and reposition frequently, maximum of every two hours  Instruct / Assist with weight shifting every 15 - 20 minutes when in chair  Increase protein intake  Refer to RD  Monitor for any sign of infection or worsening, inform PCP or patient's primary physician in your facility  Standing Status:   Future     Standing Expiration Date:   7/20/2022       Allergies  Patient has no known allergies  Assessment & Plan:  1  Type 2 diabetes mellitus without complication, with long-term current use of insulin [facility])  Assessment & Plan:    Lab Results   Component Value Date    HGBA1C 6 9 (H) 06/26/2021   - under the care of senior care      Orders:  -     Wound cleansing and dressings; Future    2  Actinic dermatitis  Assessment & Plan:  - Patient have history of chronic actinic dermatitis  He previously had is on his groin and right lower extremity   Previous treatment is clotrimazole  - He have lesion on the right shoulder that was first discovered on 7/12/2021   - Local wound care - bordered foam    - Follow up : next week            Orders:  -     Wound cleansing and dressings; Future    3  Dermatitis associated with incontinence  Assessment & Plan:  Buttocks and sacrum  - macerated  - local wound care with hydraguard and toilet hygiene  Patient currently have suprapubic catheter  - follow up next week    Orders:  -     Wound cleansing and dressings; Future    4  At high risk for pressure ulcer based on Juan Scale score  Assessment & Plan:  - ordered low air loss mattress and prevalon boots      5  Pressure injury of other site, stage 2 ([facility] )  Assessment & Plan:  Lower back  - 100% epithelial  - local wound care with bordered foam                     Subjective: This is a 80year old male referred to our service for wound on the right shoulder, lower back, and sacrum  Patient was not able to provide information related to the wound  All the information was provided by staff and review of medical record  Wound no  1 - Location Right shoulder, Duration chronic  ,Onset 7/12/2021, Etiology actinic keratosis, Severity no infection,  associated signs and symptoms - denies pain, with lesion    Wound no  2 - Location lower back, Duration  unknown ,Onset unknown, Etiology pressure ulcer, based on the wound location, Severity no sign of infection     Wound no  3 - Location buttock/ sacrum, Duration  unknown ,Onset unknown, prior to admission at [facility], Etiology increase moisture, Severity no sign of infection,              Patient's care was coordinated with nursing facility staff  Recent vitals, labs and updated medications were reviewed on EMR or chart system of facility   Past Medical, surgical, social, medication and allergy history and patient's previous records were reviewed and updated as appropriate:     Patient Active Problem List   Diagnosis    Dementia without behavioral disturbance ([facility] )    History of coronary artery disease    Peripheral vascular disease in diabetes mellitus ([facility] )    Right second toe ulcer ([place] [address] )    BPH (benign prostatic hyperplasia)    Type 2 diabetes mellitus, with long-term current use of insulin ([facility]    Chronic heart failure with preserved ejection fraction ([place] [address] )    Hypertension    Anemia    Hearing difficulty of both ears    Actinic dermatitis    Constipation    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 ([place] [address] )    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 ([facility])    Goals of care, counseling/discussion    Decubitus ulcer    Dermatitis associated with incontinence    At high risk for pressure ulcer based on Juan Scale score     Past Medical History:   Diagnosis Date    Anemia     Dementia ([place] [address] )     Diabetes mellitus ([place] [address] )     Diastolic heart failure ([facility])     Hyperlipidemia     Hypertension     Osteoarthritis     PVD (peripheral vascular disease) ([place] [address] )      Past Surgical History:   Procedure Laterality Date    AMPUTATION Left     left bka    IR LOWER EXTREMITY ANGIOGRAM  7/8/2021    TOE AMPUTATION Right 7/11/2021    Procedure: 2nd leonid AMPUTATION;  Surgeon: Sarah Sánchez DPM;  [facility];  Service: Podiatry     Social History     Socioeconomic History    Marital status:      Spouse name: None    Number of children: None    Years of education: None    Highest education level: None   Occupational History    None   Tobacco Use    Smoking status: Never Smoker    Smokeless tobacco: Never Used   Vaping Use    Vaping Use: Never used   Substance and Sexual Activity    Alcohol use: No    Drug use: Never    Sexual activity: None   Other Topics Concern    None   Social History Narrative    None     Social Determinants of Health     Financial Resource Strain:     Difficulty of Paying Living Expenses:    Food Insecurity:     Worried About Running Out of Food in the Last Year:     Ran Out of Food in the Last Year:    Transportation Needs:     Lack of Transportation (Medical):      Lack of Transportation (Non-Medical):    Physical Activity:     Days of Exercise per Week:     Minutes of Exercise per Session:    Stress:     Feeling of Stress :    Social Connections:     Frequency of Communication with Friends and Family:     Frequency of Social Gatherings with Friends and Family:     Attends Mu-ism Services:     Active Member of Clubs or Organizations:     Attends Club or Organization Meetings:     Marital Status:    Intimate Partner Violence:     Fear of Current or Ex-Partner:     Emotionally Abused:     Physically Abused:     Sexually Abused:         Current Outpatient Medications:     acetaminophen (TYLENOL) 500 mg tablet, Take 500 mg by mouth every 6 (six) hours as needed for mild pain, Disp: , Rfl:     aspirin (ECOTRIN LOW STRENGTH) 81 mg EC tablet, Take 81 mg by mouth daily, Disp: , Rfl:     atorvastatin (LIPITOR) 40 mg tablet, Take 1 tablet (40 mg total) by mouth daily with dinner, Disp: 30 tablet, Rfl: 0    bumetanide (BUMEX) 0 5 MG tablet, Take 0 5 mg by mouth daily, Disp: , Rfl:     cholecalciferol (VITAMIN D3) 1,000 units tablet, Take 1,000 Units by mouth daily, Disp: , Rfl:     ferrous sulfate 325 (65 Fe) mg tablet, Take 325 mg by mouth daily with breakfast, Disp: , Rfl:     fluticasone (FLONASE) 50 mcg/act nasal spray, fluticasone propionate 50 mcg/actuation nasal spray,suspension, Disp: , Rfl:     glucose blood test strip, test blood sugar & accucheck fasting at 7am and 2 hours after, Disp: , Rfl:     ketoconazole (NIZORAL) 2 % cream, Apply topically daily, Disp: , Rfl:     Menthol-Zinc Oxide (CALMOSEPTINE EX), Apply 1 application topically, Disp: , Rfl:     metFORMIN (GLUCOPHAGE) 500 mg tablet, Take 500 mg by mouth daily with breakfast, Disp: , Rfl:     metoprolol tartrate (LOPRESSOR) 50 mg tablet, Take 1 tablet (50 mg total) by mouth every 12 (twelve) hours, Disp: 60 tablet, Rfl: 0    Mirabegron ER (MYRBETRIQ) 50 MG TB24, Take by mouth, Disp: , Rfl:     NON FORMULARY, 1 application as needed, Disp: , Rfl:    nystatin-triamcinolone (MYCOLOG-II) ointment, Apply 1 application topically 2 (two) times a day, Disp: , Rfl:     pantoprazole (PROTONIX) 40 mg tablet, Take 40 mg by mouth 2 (two) times a day, Disp: , Rfl:     polyethylene glycol (GLYCOLAX) powder, Take 17 g by mouth daily, Disp: 255 g, Rfl: 0    tamsulosin (FLOMAX) 0 4 mg, 0 4 mg, Disp: , Rfl:   Family History   Problem Relation Age of Onset    Diabetes Mother     Diabetes Sister     Heart disease Brother         Review of Systems   Constitutional: Negative for appetite change and fatigue  HENT: Negative for mouth sores, sore throat and trouble swallowing  Eyes: Negative for pain, discharge, itching and visual disturbance  Respiratory: Negative for cough, chest tightness and shortness of breath  Cardiovascular: Negative for chest pain and leg swelling  Gastrointestinal: Negative for diarrhea and nausea  Endocrine: Negative for polydipsia, polyphagia and polyuria  Genitourinary: Negative for dysuria and flank pain  Musculoskeletal: Positive for gait problem  Skin: Positive for rash and wound  See HPI   Allergic/Immunologic: Negative for environmental allergies  Neurological: Negative for dizziness, seizures and headaches  Psychiatric/Behavioral: Positive for confusion  Negative for behavioral problems  The patient is not nervous/anxious  Objective: There were no vitals taken for this visit  Physical Exam  Constitutional:       Appearance: Normal appearance  HENT:      Head: Normocephalic and atraumatic  Nose: Nose normal       Mouth/Throat:      Mouth: Mucous membranes are moist    Eyes:      General:         Right eye: No discharge  Left eye: No discharge  Cardiovascular:      Rate and Rhythm: Normal rate  Pulses: Normal pulses  Pulmonary:      Effort: Pulmonary effort is normal    Abdominal:      General: Abdomen is flat  Tenderness: There is no abdominal tenderness  There is no guarding  Genitourinary:     Comments: Incontinent  With suprapubic catheter, draining well  Musculoskeletal:      Cervical back: Normal range of motion  Right lower leg: No edema  Left lower leg: No edema  Comments: LROM   Skin:     General: Skin is dry  Findings: Lesion present  Comments: Location Right shoulder wound bed - 1 1 x 1 5 x 0 1 cm , no undermining, no tunneling, 0 % epithelial, 100%hypergranulation,exudate - no drainage, no malodor ( assess after dressing removal and cleansing), wound edge - attached to base, periwound - intact  No localized sign of infection, Denies pain    Location Lower back wound bed - 1 2 x 1 8 x 0 1 cm , no undermining, no tunneling, 100 % epithelial, 0%granulation, 0%slough, exudate - no drainage, no malodor ( assess after dressing removal and cleansing), wound edge - attached to base, periwound - intact  No localized sign of infection, Denies pain    Buttocks and sacrum - partial thickness wound, multiple, + maceration       Neurological:      Mental Status: He is alert and oriented to person, place, and time  Gait: Gait abnormal    Psychiatric:         Cognition and Memory: Memory is impaired  He exhibits impaired recent memory and impaired remote memory  Procedures             Coordination of Care: Supervisor aware of the treatment plan    Follow up :  Return in about 1 week (around 7/27/2021)        DEMI Montanez

## 2021-07-20 NOTE — ASSESSMENT & PLAN NOTE
Buttocks and sacrum  - macerated  - local wound care with hydraguard and toilet hygiene   Patient currently have suprapubic catheter  - follow up next week

## 2021-07-20 NOTE — ASSESSMENT & PLAN NOTE
Lab Results   Component Value Date    HGBA1C 6 9 (H) 06/26/2021   - under the care of [facility]

## 2021-07-20 NOTE — PATIENT INSTRUCTIONS
Orders Placed This Encounter   Procedures    Wound cleansing and dressings     Wound:  Sacrum and buttocks  Discontinue previous wound order  Cleanse the buttocks and sacrum with soap and water, pat dry  Apply hydraguard to wound bed  Frequency : twice a day and prn for soiling    Location : right shoulder and lower back  Cleanse with NSS  Apply bordered foam to wound bed  Frequency : Three times a week and prn for soiling    Offload all wounds  Order low air loss mattress and prevalon boots  Boots at all times when in bed  Provide timely continence care  Turn and reposition frequently, maximum of every two hours  Instruct / Assist with weight shifting every 15 - 20 minutes when in chair  Increase protein intake  Refer to RD  Monitor for any sign of infection or worsening, inform PCP or patient's primary physician in your facility       Standing Status:   Future     Standing Expiration Date:   7/20/2022

## 2021-07-20 NOTE — LETTER
Patient:  Jt Parsons   3/31/1929           DEMI Yip saw Jt Parsons for a wound care visit on 7/20/2021  See below for information relating to this visit  Chief Complaint   Patient presents with    New Patient Visit     Sacrum, right shoulder, and back        Assessment/Plan:  1  Type 2 diabetes mellitus without complication, with long-term current use of insulin [facility])  Assessment & Plan:    Lab Results   Component Value Date    HGBA1C 6 9 (H) 06/26/2021   - under the care of senior care      Orders:  -     Wound cleansing and dressings; Future    2  Actinic dermatitis  Assessment & Plan:  - Patient have history of chronic actinic dermatitis  He previously had is on his groin and right lower extremity  Previous treatment is clotrimazole  - He have lesion on the right shoulder that was first discovered on 7/12/2021   - Local wound care - bordered foam    - Follow up : next week            Orders:  -     Wound cleansing and dressings; Future    3  Dermatitis associated with incontinence  Assessment & Plan:  Buttocks and sacrum  - macerated  - local wound care with hydraguard and toilet hygiene  Patient currently have suprapubic catheter  - follow up next week    Orders:  -     Wound cleansing and dressings; Future    4  At high risk for pressure ulcer based on Juan Scale score  Assessment & Plan:  - ordered low air loss mattress and prevalon boots      5   Pressure injury of other site, stage 2 [facility])  Assessment & Plan:  Lower back  - 100% epithelial  - local wound care with bordered foam                     Orders:  Jt Parsons  3/31/1929  Orders Placed This Encounter   Procedures    Wound cleansing and dressings     Wound:  Sacrum and buttocks  Discontinue previous wound order  Cleanse the buttocks and sacrum with soap and water, pat dry  Apply hydraguard to wound bed  Frequency : twice a day and prn for soiling    Location : right shoulder and lower back  Cleanse with NSS  Apply bordered foam to wound bed  Frequency : Three times a week and prn for soiling    Offload all wounds  Order low air loss mattress and prevalon boots  Boots at all times when in bed  Provide timely continence care  Turn and reposition frequently, maximum of every two hours  Instruct / Assist with weight shifting every 15 - 20 minutes when in chair  Increase protein intake  Refer to RD  Monitor for any sign of infection or worsening, inform PCP or patient's primary physician in your facility  Standing Status:   Future     Standing Expiration Date:   7/20/2022         Follow Up:  Return in about 1 week (around 7/27/2021)  [facility]ink hours are 8:00 am - 4:30 pm Monday through Friday  The center phone number is [phone]  You can also contact me directly thru my email at [employer] [email or web address]  [email or web address] or thru tiger text  If it is an emergency, please contact the PCP or patient's attending physician in your facility       Sincerely,    Electronically signed by DEMI Fuller    Patient : Vy Cazares    3/31/1929

## 2021-07-20 NOTE — ASSESSMENT & PLAN NOTE
- Patient have history of chronic actinic dermatitis  He previously had is on his groin and right lower extremity   Previous treatment is clotrimazole  - He have lesion on the right shoulder that was first discovered on 7/12/2021   - Local wound care - bordered foam    - Follow up : next week

## 2021-07-21 VITALS
BODY MASS INDEX: 26.44 KG/M2 | HEART RATE: 90 BPM | TEMPERATURE: 97.5 F | RESPIRATION RATE: 16 BRPM | WEIGHT: 158.9 LBS | SYSTOLIC BLOOD PRESSURE: 110 MMHG | DIASTOLIC BLOOD PRESSURE: 66 MMHG

## 2021-07-21 NOTE — PROGRESS NOTES
[address]   [phone]  [facility]  STR PRogress Note        NAME: Smith Murrell  AGE: 80 y o  SEX: male    DATE OF ENCOUNTER: 7/20/21    Assessment and Plan     1  Chronic heart failure with preserved ejection fraction (HCC)  Assessment & Plan:  · Weights stable  · Continue with bumex,   · Continue to monitor weights        2  Dementia without behavioral disturbance, unspecified dementia type ([address] )  Assessment & Plan:  · At baseline  · Continue with supportive care  · Assist with ADL and IADL  · Monitor for delirium      3  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 (HCC)  Assessment & Plan:  · Healing  · Continue with current wound care  · Assist with ADL and IaDL  · PT/OT      4  Hypertension, unspecified type  Assessment & Plan:  · Controlled  · contineu with bumetanide, metoprolol  · Monitor BP      5  Type 2 diabetes mellitus without complication, with long-term current use of insulin ([facility])  Assessment & Plan:  · Daily BS control  · continue with metformin   · Monitor BS          No orders of the defined types were placed in this encounter  History of Present Illness     Smith Murrell 80 y o  male seen for follow-up of care and treatment plan for ambulatory dysfunction doing well at STR, CHF asymptomatic, dementia at baseline, right toe amputation wound healing, HTN controlled with BP meds, DM2 controlled with metformin    The following portions of the patient's history were reviewed and updated as appropriate: allergies, current medications, past family history, past medical history, past social history, past surgical history and problem list     Review of Systems     Review of Systems   Constitutional: Negative for chills and fever  HENT: Negative for ear pain and sore throat  Eyes: Negative for pain and visual disturbance  Respiratory: Negative for cough and shortness of breath      Cardiovascular: Negative for chest pain and palpitations  Gastrointestinal: Negative for abdominal pain and vomiting  Genitourinary: Negative for dysuria and hematuria  Musculoskeletal: Positive for gait problem  Negative for arthralgias and back pain  Skin: Positive for wound  Negative for color change and rash  Neurological: Positive for weakness  Negative for seizures and syncope  Psychiatric/Behavioral: Positive for confusion  All other systems reviewed and are negative  Active Problem List     Patient Active Problem List   Diagnosis    Dementia without behavioral disturbance (HCC)    History of coronary artery disease    Peripheral vascular disease in diabetes mellitus ([facility] )    Right second toe ulcer ([place] [address] )    BPH (benign prostatic hyperplasia)    Type 2 diabetes mellitus, with long-term current use of insulin ([place] [address] )    Chronic heart failure with preserved ejection fraction ([place] [address] )    Hypertension    Anemia    Hearing difficulty of both ears    Actinic dermatitis    Constipation    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 ([place] [address] )    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 ([place] [address] )    Goals of care, counseling/discussion    Decubitus ulcer    Dermatitis associated with incontinence    At high risk for pressure ulcer based on Juan Scale score       Objective     /66   Pulse 90   Temp 97 5 °F (36 4 °C)   Resp 16   Wt 72 1 kg (158 lb 14 4 oz)   BMI 26 44 kg/m²     Physical Exam  Vitals reviewed  Constitutional:       Appearance: He is well-developed  HENT:      Head: Normocephalic and atraumatic  Eyes:      Conjunctiva/sclera: Conjunctivae normal    Cardiovascular:      Rate and Rhythm: Normal rate and regular rhythm  Heart sounds: Normal heart sounds, S1 normal and S2 normal  No murmur heard  Pulmonary:      Effort: Pulmonary effort is normal  No respiratory distress  Breath sounds: Normal breath sounds  No wheezing     Abdominal:      General: Bowel sounds are normal  There is no distension  Palpations: Abdomen is soft  Tenderness: There is no abdominal tenderness  Musculoskeletal:         General: Normal range of motion  Cervical back: Normal range of motion  Comments: Generalized weakness, right toe amputation   Skin:     General: Skin is warm and dry  Neurological:      Mental Status: He is alert  Psychiatric:         Mood and Affect: Mood normal          Speech: Speech normal          Behavior: Behavior normal          Thought Content: Thought content normal          Cognition and Memory: Cognition is impaired  Memory is impaired  Pertinent Laboratory/Diagnostic Studies:  HFM Labs Reviewed    Current Medications   Medication list reviewed and updated today  Please see paper chart for updated medication list      Dashawn Kathy  /14206:95 PM      Name: Antonio Cardenas  : 3/31/1929  MRN: [id number]  DOS: 21    Diagnoses:   Diagnosis ICD-10-CM Associated Orders   1  Chronic heart failure with preserved ejection fraction ([facility])  I50 32    2  Dementia without behavioral disturbance, unspecified dementia type ([facility] )  F03 90    3  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

## 2021-07-22 ENCOUNTER — OFFICE VISIT (OUTPATIENT)
Dept: WOUND CARE | Facility: HOSPITAL | Age: 86
End: 2021-07-22
Payer: MEDICARE

## 2021-07-22 VITALS
HEART RATE: 86 BPM | DIASTOLIC BLOOD PRESSURE: 65 MMHG | RESPIRATION RATE: 20 BRPM | HEIGHT: 65 IN | BODY MASS INDEX: 26.48 KG/M2 | WEIGHT: 158.9 LBS | TEMPERATURE: 97.2 F | SYSTOLIC BLOOD PRESSURE: 117 MMHG

## 2021-07-22 DIAGNOSIS — I73.9 PERIPHERAL ARTERIAL DISEASE (HCC): ICD-10-CM

## 2021-07-22 DIAGNOSIS — M86.171 ACUTE OSTEOMYELITIS OF TOE OF RIGHT FOOT (HCC): ICD-10-CM

## 2021-07-22 DIAGNOSIS — S91.104A OPEN WOUND OF SECOND TOE OF RIGHT FOOT, INITIAL ENCOUNTER: Primary | ICD-10-CM

## 2021-07-22 DIAGNOSIS — Z89.421 ACQUIRED ABSENCE OF OTHER RIGHT TOE(S) (HCC): ICD-10-CM

## 2021-07-22 PROCEDURE — 99213 OFFICE O/P EST LOW 20 MIN: CPT | Performed by: PODIATRIST

## 2021-07-22 NOTE — PATIENT INSTRUCTIONS
Orders Placed This Encounter   Procedures    Wound cleansing and dressings     Right 2nd toe amp site:  Wound care q3d  May wash foot with soap and water, rinse and pat dry  Do not soak  Apply xeroform over suture line  Cover with gauze and secure gently with rolled gauze  May use tubifast to help keep dressing intact  This was applied today  Recommend use of heel foam to protect fragile pressure point on heel as well as prevalon boot for offloading       Standing Status:   Future     Standing Expiration Date:   7/22/2022

## 2021-07-22 NOTE — PROGRESS NOTES
Patient ID: Fatemeh Mcclellan is a 80 y o  male Date of Birth 3/31/1929     Chief Complaint  Chief Complaint   Patient presents with    New Patient Visit     right 2nd toe amp site       Allergies  Patient has no known allergies  Assessment:    No problem-specific Assessment & Plan notes found for this encounter  Diagnoses and all orders for this visit:    Open wound of second toe of right foot, initial encounter  -     Wound cleansing and dressings; Future    Acute osteomyelitis of toe of right foot ([facility] )    Acquired absence of other right toe(s) ([facility] )    Peripheral arterial disease ([facility] )              Procedures    Plan:  1  Reviewed OP note  Incision is stable and co-apted  Continue local care and off-loading  2  Off-loading of heel when on bed  3  Suture removal in the next visit  4  Discussed the progress with his POA over the phone           Wound 07/22/21 Surgical Toe (Comment  which one) Right (Active)   Wound Image Images linked 07/22/21 0935       Wound 07/08/21 Catheter entry/exit site Groin Left (Active)   Date First Assessed/Time First Assessed: 07/08/21 1426   Traumatic Wound Type: Catheter entry/exit site  Location: Groin  Wound Location Orientation: Left  Wound Description (Comments): left cfa puncture for arteriogram       Wound 07/12/21 Shoulder Right (Active)   Date First Assessed/Time First Assessed: 07/12/21 0710   Pre-Existing Wound: Yes  Location: Shoulder  Wound Location Orientation: Right  Wound Description (Comments): pink raised area; small amt serosang dge noted  Incision's 1st Dressing: dermagran       Wound 07/22/21 Surgical Toe (Comment  which one) Right (Active)   Date First Assessed/Time First Assessed: 07/22/21 0933   Pre-Existing Wound: Yes  Primary Wound Type: Surgical  Location: (c) Toe (Comment  which one)  Wound Location Orientation: Right       [REMOVED] Wound 07/11/21 Foot Right (Removed)   Resolved Date/Resolved Time: 07/22/21 0932  Date First Assessed/Time First Assessed: 07/11/21 4824   Location: Foot  Wound Location Orientation: Right  Wound Description (Comments): 2nd toe amputation site   Incision's 1st Dressing: DRESSING XEROFORM   Amanda Mercado Subjective:        HPI  Alexander Shah presents for right foot wound  S/P amputation of right 2nd toe following angioplasty  He has demential and I was not able to obtain history  History was taken from medical records  He denies pain  History of left leg amputation  The following portions of the patient's history were reviewed and updated as appropriate: allergies, current medications, past family history, past medical history, past social history, past surgical history and problem list       PAST MEDICAL HISTORY:  Past Medical History:   Diagnosis Date    Anemia     D[facility]     Diabetes mellitus ([facility]     Diastolic heart failure ([facility] )     Hyperlipidemia     Hypertension     Osteoarthritis     [facility]        PAST SURGICAL HISTORY:  Past Surgical History:   Procedure Laterality Date    AMPUTATION Left     left bka    IR LOWER EXTREMITY ANGIOGRAM  7/8/2021    TOE AMPUTATION Right 7/11/2021    Procedure: 2nd leonid AMPUTATION;  Surgeon: Sharmaine Moore DPM;  [facility];  Service: Podiatry        ALLERGIES:  Patient has no known allergies      MEDICATIONS:  Current Outpatient Medications   Medication Sig Dispense Refill    acetaminophen (TYLENOL) 500 mg tablet Take 500 mg by mouth every 6 (six) hours as needed for mild pain      aspirin (ECOTRIN LOW STRENGTH) 81 mg EC tablet Take 81 mg by mouth daily      atorvastatin (LIPITOR) 40 mg tablet Take 1 tablet (40 mg total) by mouth daily with dinner 30 tablet 0    bumetanide (BUMEX) 0 5 MG tablet Take 0 5 mg by mouth daily      cholecalciferol (VITAMIN D3) 1,000 units tablet Take 1,000 Units by mouth daily      ferrous sulfate 325 (65 Fe) mg tablet Take 325 mg by mouth daily with breakfast      fluticasone (FLONASE) 50 mcg/act nasal spray fluticasone propionate 50 mcg/actuation nasal spray,suspension      glucose blood test strip test blood sugar & accucheck fasting at 7am and 2 hours after      ketoconazole (NIZORAL) 2 % cream Apply topically daily      Menthol-Zinc Oxide (CALMOSEPTINE EX) Apply 1 application topically      metFORMIN (GLUCOPHAGE) 500 mg tablet Take 500 mg by mouth daily with breakfast      metoprolol tartrate (LOPRESSOR) 50 mg tablet Take 1 tablet (50 mg total) by mouth every 12 (twelve) hours 60 tablet 0    Mirabegron ER (MYRBETRIQ) 50 MG TB24 Take by mouth      NON FORMULARY 1 application as needed      nystatin-triamcinolone (MYCOLOG-II) ointment Apply 1 application topically 2 (two) times a day      pantoprazole (PROTONIX) 40 mg tablet Take 40 mg by mouth 2 (two) times a day      polyethylene glycol (GLYCOLAX) powder Take 17 g by mouth daily 255 g 0    tamsulosin (FLOMAX) 0 4 mg 0 4 mg       No current facility-administered medications for this visit  SOCIAL HISTORY:  Social History     Socioeconomic History    Marital status:      Spouse name: None    Number of children: None    Years of education: None    Highest education level: None   Occupational History    None   Tobacco Use    Smoking status: Never Smoker    Smokeless tobacco: Never Used   Vaping Use    Vaping Use: Never used   Substance and Sexual Activity    Alcohol use: No    Drug use: Never    Sexual activity: None   Other Topics Concern    None   Social History Narrative    None     Social Determinants of Health     Financial Resource Strain:     Difficulty of Paying Living Expenses:    Food Insecurity:     Worried About Running Out of Food in the Last Year:     Ran Out of Food in the Last Year:    Transportation Needs:     Lack of Transportation (Medical):      Lack of Transportation (Non-Medical):    Physical Activity:     Days of Exercise per Week:     Minutes of Exercise per Session:    Stress:     Feeling of Stress :    Social Connections:     Frequency of Communication with Friends and Family:     Frequency of Social Gatherings with Friends and Family:     Attends Sikhism Services:     Active Member of Clubs or Organizations:     Attends Club or Organization Meetings:     Marital Status:    Intimate Partner Violence:     Fear of Current or Ex-Partner:     Emotionally Abused:     Physically Abused:     Sexually Abused:         Review of Systems   Unable to perform ROS: Dementia         Objective:       Wound 07/22/21 Surgical Toe (Comment  which one) Right (Active)   Wound Image Images linked 07/22/21 0935       /65   Pulse 86   Temp (!) 97 2 °F (36 2 °C)   Resp 20   Ht 5' 5" (1 651 m)   Wt 72 1 kg (158 lb 14 4 oz)   BMI 26 44 kg/m²     Physical Exam  Vitals and nursing note reviewed  Constitutional:       General: He is not in acute distress  Appearance: He is not toxic-appearing or diaphoretic  Cardiovascular:      Rate and Rhythm: Normal rate and regular rhythm  Pulses:           Dorsalis pedis pulses are 0 on the right side  Posterior tibial pulses are 0 on the right side  Pulmonary:      Effort: Pulmonary effort is normal  No respiratory distress  Musculoskeletal:         General: Deformity present  No signs of injury  Right lower leg: No edema  Left lower leg: No edema  Comments: Hammertoe and bunion right foot  S/p right 2nd toe amputation  Left Lower Extremity: Left leg is amputated below knee  Skin:     Capillary Refill: Capillary refill takes less than 2 seconds  Findings: No erythema or rash  Comments: Incision is coapted right foot  No necrosis  No signs of infection  See wound assessment  Neurological:      General: No focal deficit present  Mental Status: He is disoriented  Cranial Nerves: No cranial nerve deficit     Psychiatric:         Mood and Affect: Mood normal            Wound Instructions:  Orders Placed This Encounter   Procedures    Wound cleansing and dressings     Right 2nd toe amp site:  Wound care q3d  May wash foot with soap and water, rinse and pat dry  Do not soak  Apply xeroform over suture line  Cover with gauze and secure gently with rolled gauze  May use tubifast to help keep dressing intact  This was applied today  Recommend use of heel foam to protect fragile pressure point on heel as well as prevalon boot for offloading  Standing Status:   Future     Standing Expiration Date:   7/22/2022        Diagnosis ICD-10-CM Associated Orders   1  Open wound of second toe of right foot, initial encounter  S91 104A Wound cleansing and dressings   2  Acute osteomyelitis of toe of right foot ([facility] )  M86 171    3  Ac[facility] absence of other right toe(s) ([facility] )  Z89 421    4   Peripheral arterial disease (HCC)  I73 9

## 2021-07-24 PROBLEM — F33.9 EPISODE OF RECURRENT MAJOR DEPRESSIVE DISORDER (HCC): Status: ACTIVE | Noted: 2021-07-24

## 2021-07-24 PROBLEM — F32.2 CURRENT SEVERE EPISODE OF MAJOR DEPRESSIVE DISORDER WITHOUT PSYCHOTIC FEATURES WITHOUT PRIOR EPISODE (HCC): Status: ACTIVE | Noted: 2021-07-24

## 2021-07-24 NOTE — PROGRESS NOTES
[address] [facility]  POS: 31: SNF/Short Term Rehab, [facility]    Name and Date of Birth:  Kendra Huston 80 y o  3/31/1929 MRN: [id number]    Date of Visit: July 20, 2021    Reason for visit (CC): Seen for Psychiatric Consult  at Nursing Facility/Assisted Living    No Known Allergies  HPI     Geri Carias is a 80 y o  male with a psychiatric history of dementia, insomnia and and no other psychiatric history is known who presents for psychiatric evaluation to establish care and due to depressive symptoms and passive death wish  Symptoms first started abruptly 1 week ago and progressively worsened over the last 1 week  Stressors preceding visit included health issues, medical problems, chronic pain and limited support  Assessment:    Geri Carias presents oriented to self only and limited historian due to dementia  He is here for short term rehab after hospitalization for osteomyelitis  He lives at [facility]  He currently denies any suicidal thoughts or passive death wish  He minimizes any statements he may have made  He minimizes any depressive symptoms, but staff report low motivation and poor appetite  No overt psychosis, he denies any previous psychiatric treatment  Would recommend starting Remeron for mood and appetite  He does not require any suicide precautions  Plan:   All medications and routine orders were reviewed and updated as needed  Recommend starting Remeron 15 mg    Medication management every 2 weeks , will follow up if he is still at facility  Can otherwise be managed at his assisted living by provider    Plan discussed with: Nurse    Current Outpatient Medications on File Prior to Visit   Medication Sig Dispense Refill    acetaminophen (TYLENOL) 500 mg tablet Take 500 mg by mouth every 6 (six) hours as needed for mild pain      aspirin (ECOTRIN LOW STRENGTH) 81 mg EC tablet Take 81 mg by mouth daily  atorvastatin (LIPITOR) 40 mg tablet Take 1 tablet (40 mg total) by mouth daily with dinner 30 tablet 0    bumetanide (BUMEX) 0 5 MG tablet Take 0 5 mg by mouth daily      cholecalciferol (VITAMIN D3) 1,000 units tablet Take 1,000 Units by mouth daily      ferrous sulfate 325 (65 Fe) mg tablet Take 325 mg by mouth daily with breakfast      fluticasone (FLONASE) 50 mcg/act nasal spray fluticasone propionate 50 mcg/actuation nasal spray,suspension      glucose blood test strip test blood sugar & accucheck fasting at 7am and 2 hours after      ketoconazole (NIZORAL) 2 % cream Apply topically daily      Menthol-Zinc Oxide (CALMOSEPTINE EX) Apply 1 application topically      metFORMIN (GLUCOPHAGE) 500 mg tablet Take 500 mg by mouth daily with breakfast      metoprolol tartrate (LOPRESSOR) 50 mg tablet Take 1 tablet (50 mg total) by mouth every 12 (twelve) hours 60 tablet 0    Mirabegron ER (MYRBETRIQ) 50 MG TB24 Take by mouth      NON FORMULARY 1 application as needed      nystatin-triamcinolone (MYCOLOG-II) ointment Apply 1 application topically 2 (two) times a day      pantoprazole (PROTONIX) 40 mg tablet Take 40 mg by mouth 2 (two) times a day      polyethylene glycol (GLYCOLAX) powder Take 17 g by mouth daily 255 g 0    tamsulosin (FLOMAX) 0 4 mg 0 4 mg       No current facility-administered medications on file prior to visit        Diagnoses and all orders for this visit:    Current severe episode of major depressive disorder without psychotic features without prior episode ([place] [address] )    Dementia without behavioral disturbance, unspecified dementia type ([place] [address] )    Severe episode of recurrent major depressive disorder, without psychotic features ([place] [address] )         Psychiatric Review Of Systems:    Sleep changes: fluctuating sleep pattern  Appetite changes: decreased appetite  Weight changes: unknown  Energy/anergy: decreased energy  Interest/pleasure/anhedonia: decreased  Somatic symptoms: yes  Anxiety/panic: no  Kacie: no  Guilty/hopeless: no  Self injurious behavior/risky behavior: Patient denies current risk or intent to self harm  Suicidal ideation: Denies suicidal ideation  Homicidal ideation: Patient denies homicidal ideations  Auditory hallucinations: no  Visual hallucinations: no  Other hallucinations: no  Delusional thinking: no  Eating disorder history: no  Obsessive/compulsive symptoms: no    Review Of Systems:    General decreased functioning   Personality no change in personality   Constitutional feeling poorly, feeling tired and low energy   ENT negative   Cardiovascular negative   Respiratory negative   Gastrointestinal negative   Genitourinary negative   Musculoskeletal negative   Integumentary negative   Neurological negative   Endocrine negative   Other Symptoms none, all other systems are negative       OBJECTIVE:    Vital signs in last 24 hours: Vital signs and nursing notes reviewed in facility chart      Mental Status Evaluation:      Appearance Adequate hygiene and grooming   Behavior calm and cooperative   Mood depressed   Speech Increased latency of response   Affect Flat   Thought Processes Poverty of thoughts   Thought Content Does not verbalize delusional material   Associations concrete associations   Perceptual Disturbances Denies hallucinations and does not appear to be responding to internal stimuli   Risk Potential Suicidal/Homicidal Ideation - No evidence of suicidal or homicidal ideation and patient does not verbalize suicidal or homicidal ideation  Risk of Violence - No evidence of risk for violence found on assessment  Risk of Self Mutilation - No evidence of risk for self mutilation found on assessment   Orientation oriented to person and situation   Memory recent memory impaired   Consciousness alert and awake   Attention/Concentration decreased attention span  decreased concentration   Intellect appears to be of average intelligence   Insight limited   Judgement limited   Muscle Strength and Gait seen in bed   Motor Activity no abnormal movements   Language no difficulty naming common objects, no difficulty repeating a phrase, no difficulty writing a sentence   Fund of Knowledge impaired knowledge of current events   Pain none   Pain Scale 0       Laboratory Results: I have personally reviewed all pertinent laboratory/tests results  Historical Information     History Review: The following portions of the patient's history were reviewed and updated as appropriate: allergies, current medications, past family history, past medical history, past social history, past surgical history and problem list     Past Psychiatric History:     Past Inpatient Psychiatric Treatment:   No history of past inpatient psychiatric admissions  Past Outpatient Psychiatric Treatment:    No history of past outpatient psychiatric treatment  Past Suicide Attempts: No evidence of past suicide attempts  Past Violent Behavior: No evidence of past violent behavior and Patient denies history of violent behavior  Past Psychiatric Medication Trials: none    Traumatic History:     Abuse: no history of physical or sexual abuse  Other Traumatic Events: none     Family Psychiatric History:     Family History   Problem Relation Age of Onset    Diabetes Mother     Diabetes Sister     Heart disease Brother      Substance Use History:    Social History     Substance and Sexual Activity   Alcohol Use No     Social History     Substance and Sexual Activity   Drug Use Never     Social History:    Social History     Socioeconomic History    Marital status:       Spouse name: Not on file    Number of children: Not on file    Years of education: Not on file    Highest education level: Not on file   Occupational History    Not on file   Tobacco Use    Smoking status: Never Smoker    Smokeless tobacco: Never Used   Vaping Use    Vaping Use: Never used   Substance and Sexual Activity    Alcohol use: No    Drug use: Never    Sexual activity: Not on file   Other Topics Concern    Not on file   Social History Narrative    Not on file     Social Determinants of Health     Financial Resource Strain:     Difficulty of Paying Living Expenses:    Food Insecurity:     Worried About Running Out of Food in the Last Year:     [address] in the Last Year:    Transportation Needs:     Lack of Transportation (Medical):  Lack of Transportation (Non-Medical):    Physical Activity:     Days of Exercise per Week:     Minutes of Exercise per Session:    Stress:     Feeling of Stress :    Social Connections:     Frequency of Communication with Friends and Family:     Frequency of Social Gatherings with Friends and Family:     Attends [employer] Services:     Active Member of Clubs or Organizations:     Attends Club or Organization Meetings:     Marital Status:    Intimate Partner Violence:     Fear of Current or Ex-Partner:     Emotionally Abused:     Physically Abused:     Sexually Abused:      Past Medical History:    Past Medical History:   Diagnosis Date    Anemia     D[facility]     Diabetes mellitus ([facility] )     Diastolic heart failure ([facility] )     Hyperlipidemia     Hypertension     Osteoarthritis     [facility]peripheral [facility] disease) ([facility] )      No past medical history pertinent negatives  Past Surgical History:   Procedure Laterality Date    AMPUTATION Left     left bka    IR LOWER EXTREMITY ANGIOGRAM  7/8/2021    TOE AMPUTATION Right 7/11/2021    Procedure: 2nd leonid AMPUTATION;  Surgeon: Lorenzo Aguilar DPM;  Location: [facility];  Service: Podiatry         Medications Risks/Benefits:      Risks, Benefits And Possible Side Effects Of Medications:    Discussed risks and benefits of treatment with patient including risk of suicidality, serotonin syndrome and SIADH related to treatment with antidepressants;  Risk of induction of manic symptoms in certain patient populations     Controlled Medication Discussion:     Not applicable - controlled prescriptions are not prescribed by this practice        DEMI Preciado

## 2021-07-25 ENCOUNTER — TELEPHONE (OUTPATIENT)
Dept: OTHER | Facility: OTHER | Age: 86
End: 2021-07-25

## 2021-07-26 NOTE — TELEPHONE ENCOUNTER
Message sent to the on call physician in regard to the patient being unresponsive, blood pressure is 64/37

## 2023-09-07 NOTE — ED NOTES
Please let the patient know that most recent lab work in terms of renal parameters are stable. Will discuss further at the upcoming visit, let me know if they have any questions or concerns.     Thanks Pt given meal box (ok as per [facility])        Andrez Sweet RN  09/10/19 0137

## (undated) DEVICE — OCCLUSIVE GAUZE STRIP,3% BISMUTH TRIBROMOPHENATE IN PETROLATUM BLEND: Brand: XEROFORM

## (undated) DEVICE — ACE WRAP 4 IN UNSTERILE

## (undated) DEVICE — KERLIX BANDAGE ROLL: Brand: KERLIX

## (undated) DEVICE — CUFF TOURNIQUET 24 X 4 IN QUICK CONNECT DISP 1BLA

## (undated) DEVICE — GAUZE SPONGES,16 PLY: Brand: CURITY

## (undated) DEVICE — SUT ETHILON 4-0 PS-2 18 IN 1667G

## (undated) DEVICE — UNIVERSAL MAJOR EXTREMITY,KIT: Brand: CARDINAL HEALTH

## (undated) DEVICE — GLOVE INDICATOR PI UNDERGLOVE SZ 7.5 BLUE

## (undated) DEVICE — GLOVE SRG BIOGEL ECLIPSE 7.5

## (undated) DEVICE — STOCKINETTE REGULAR

## (undated) DEVICE — SPONGE PVP SCRUB WING STERILE

## (undated) DEVICE — INTENDED FOR TISSUE SEPARATION, AND OTHER PROCEDURES THAT REQUIRE A SHARP SURGICAL BLADE TO PUNCTURE OR CUT.: Brand: BARD-PARKER SAFETY BLADES SIZE 15, STERILE

## (undated) DEVICE — ABDOMINAL PAD: Brand: DERMACEA

## (undated) DEVICE — CURITY STRETCH BANDAGE: Brand: CURITY